# Patient Record
Sex: FEMALE | Race: WHITE | Employment: UNEMPLOYED | URBAN - METROPOLITAN AREA
[De-identification: names, ages, dates, MRNs, and addresses within clinical notes are randomized per-mention and may not be internally consistent; named-entity substitution may affect disease eponyms.]

---
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
## 2017-01-04 ENCOUNTER — TELEPHONE (OUTPATIENT)
Dept: NEUROLOGY | Age: 47
End: 2017-01-04

## 2017-01-04 NOTE — TELEPHONE ENCOUNTER
Spoke to patients mother and no seizures noted at higher phenytoin level   Will See Dr. Meagan Loya on 1/5/17

## 2017-01-05 ENCOUNTER — DOCUMENTATION ONLY (OUTPATIENT)
Dept: NEUROLOGY | Age: 47
End: 2017-01-05

## 2017-01-05 ENCOUNTER — OFFICE VISIT (OUTPATIENT)
Dept: NEUROLOGY | Age: 47
End: 2017-01-05

## 2017-01-05 VITALS
OXYGEN SATURATION: 98 % | SYSTOLIC BLOOD PRESSURE: 116 MMHG | HEART RATE: 70 BPM | BODY MASS INDEX: 39.49 KG/M2 | WEIGHT: 237 LBS | HEIGHT: 65 IN | DIASTOLIC BLOOD PRESSURE: 78 MMHG

## 2017-01-05 DIAGNOSIS — R56.9 SEIZURE (HCC): ICD-10-CM

## 2017-01-05 DIAGNOSIS — M54.31 SCIATICA OF RIGHT SIDE: ICD-10-CM

## 2017-01-05 DIAGNOSIS — Z79.899 HIGH RISK MEDICATION USE: ICD-10-CM

## 2017-01-05 DIAGNOSIS — G40.802 OTHER EPILEPSY WITHOUT STATUS EPILEPTICUS, NOT INTRACTABLE (HCC): Primary | ICD-10-CM

## 2017-01-05 DIAGNOSIS — R56.9 CONVULSIONS, UNSPECIFIED CONVULSION TYPE (HCC): ICD-10-CM

## 2017-01-05 DIAGNOSIS — M48.061 LUMBAR STENOSIS: ICD-10-CM

## 2017-01-05 DIAGNOSIS — F81.9 MENTAL DEVELOPMENTAL DELAY: ICD-10-CM

## 2017-01-05 RX ORDER — EXTENDED PHENYTOIN SODIUM 30 MG/1
30 CAPSULE ORAL DAILY
Qty: 90 CAP | Refills: 3 | Status: SHIPPED | OUTPATIENT
Start: 2017-01-05 | End: 2018-01-08 | Stop reason: SDUPTHER

## 2017-01-05 RX ORDER — PHENYTOIN SODIUM 100 MG/1
CAPSULE, EXTENDED RELEASE ORAL
Qty: 150 CAP | Refills: 3 | Status: SHIPPED | OUTPATIENT
Start: 2017-01-05 | End: 2017-04-13 | Stop reason: SDUPTHER

## 2017-01-05 NOTE — LETTER
Dear Elian Good MD, Thank you for allowing me to see your patient, Fior Hartley for a neurological consultation. Please see my impression and recommendations as outlined in my note. Sincerely, Aaron Correa MD 
[facility] (formerly [facility]3] Neurology Clinic at [facility] 
 
1/5/2017 Fior Hartley 
1970 
[id number] Seizures HISTORY OF PRESENT ILLNESS Fior Hartley  is a 51y.o. year old female who presents today, accompanied by mother, for follow up on seizure disorder. No seizures. She has a slightly high PHT level but is tolerating this completely fine. She is having no issues with the dose. Trying to decrease previously did cause seizures that were very intense. Dilantin 200/330 Trokendii 400mg daily She continues with pain on the right leg. She does have a disc at L3-4. She would like to restart PT. She does have a circumducted gait. Recap: 
She has had two seizures since Feb appt. One seizure was when she went for her first therapy session and she was stressed. She has another one while at [employer]. She is still having sleepiness with the Trokendii 400mg daily. She loves to sleep and doesn't get much exercise. She is going to [facility] for one day a week. She has a lipoma on her abdomen and she has to have surgery in September. She also may have a hernia. She continues on dilantin 200/300. We were going to wean this but she continued with seizures so remained on this dose. Patient has a new complaint today. She is having right leg and hip pain. She has pain on the lateral thigh. She is doing therapy for this. She has never had evaluation of her lower back. Patient Active Problem List  
 Diagnosis Date Noted  Chronic migraine 12/19/2014  Mental developmental delay 12/19/2014  Seizure ([address] 12/03/2013 Past Medical History Diagnosis Date  Bladder incontinence Past Surgical History Procedure Laterality Date  Hx appendectomy  Hx other surgical  2002  
  remove a lypoma Family History Problem Relation Age of Onset  Heart Disease Father Current Outpatient Prescriptions Medication Sig Dispense Refill  FLUoxetine (PROZAC) 20 mg capsule TAKE 1 CAPSULE BY MOUTH DAILY FOR MOOD 90 Cap 3  
 LORazepam (ATIVAN) 1 mg tablet Take 1 tab 1 hour prior to procedure Then take 1 tab at time of procedure 2 Tab 0  phenytoin ER (DILANTIN ER) 100 mg ER capsule Take 2 capsules by mouth in morning and take 2 capsules by mouth every day at bedtime 120 Cap 3  
 topiramate ER (TROKENDI XR) 200 mg capsule Take 2 Caps by mouth daily. 60 Cap 5  
 TROKENDI  mg capsule TAKE 2 CAPSULES BY MOUTH DAILY. 60 Cap 5  
 butalbital-acetaminophen-caffeine (FIORICET) -40 mg per tablet Take 1 Tab by mouth every four (4) hours as needed for Pain or Headache. Indications: MIGRAINE, TENSION-TYPE HEADACHE 50 Tab 1  
 DILANTIN 30 mg ER capsule Take 1 Cap by mouth daily. 90 Cap 3  
 mirabegron ER (MYRBETRIQ) 25 mg ER tablet Take 25 mg by mouth daily.  NITROFURANTOIN MONOHYD/M-CRYST (MACROBID PO) Take  by mouth.  OTHER Allergies Allergen Reactions  Adhesive Tape-Silicones Other (comments) Social History Social History  Marital status: SINGLE Spouse name: N/A  
 Number of children: N/A  
 Years of education: N/A Occupational History  Not on file. Social History Main Topics  Smoking status: Never Smoker  Smokeless tobacco: Not on file  Alcohol use No  
 Drug use: Not on file  Sexual activity: Not on file Other Topics Concern  Not on file Social History Narrative MRI L spine: L3-4 stenosis Review of Systems A comprehensive review of systems was negative except for that written in the HPI. Objective:  
 
Recent Vitals Visit Vitals  /78  Pulse 70  
 Ht 5' 5\" (1.651 m)  Wt 237 lb (107.5 kg)  SpO2 98%  BMI 39.44 kg/m2 Physical Exam: General appearance: alert, cooperative, no distress, slowed mentation Lungs: clear to auscultation bilaterally Heart: regular rate and rhythm, S1, S2 normal, no murmur, click, rub or gallop Extremities: extremities normal, atraumatic, no cyanosis or edema Skin: Skin color, texture, turgor normal. No rashes or lesions Neurologic: Alert and oriented X 3, normal strength and tone. Normal symmetric reflexes. Normal coordination and gait Assessment:  
 
Partial Complex seizures, doing well on current meds. PHT level slightly high but patient asymptomatic. She continues with right leg pain. Will restart therapy and consider a trial of gabapentin if this does not help. Plan: No labs needed MRI lumbar spine as above Dexa scan wnl Continue Dilantin 200/330. New prescription given Continue Trokendi 400 mg daily  
continue Fioricet for migraine headache Encourage daily mental and physical exercise Reorder physical therapy Consider gabapentin 300 mg nightly if pain persists Followup 6 months Signed: Corey Puri MD 
1/5/2017 
10:28 AM 
 
This note will not be viewable in [address]

## 2017-01-05 NOTE — MR AVS SNAPSHOT
Visit Information Date & Time Provider Department Dept. Phone Encounter #  
 1/5/2017 12:00 PM Jsoe Becerra MD Neurology Clinic at [facility] [phone] [id number] Your Appointments 3/3/2017 11:40 AM  
Follow Up with Jose Becerra MD  
Neurology Clinic at [facility] [address]) Appt Note: follow up seizures $ 0 CP jll 8/31/16  
 [facility], 
[address], [address] [facility] [address]  
[address], [address], [address] [facility] [address] Upcoming Health Maintenance Date Due DTaP/Tdap/Td series (1 - Tdap) 11/20/1990 PAP AKA CERVICAL CYTOLOGY 11/20/1990 INFLUENZA AGE 9 TO ADULT 8/1/2016 Allergies as of 1/5/2017  Review Complete On: 8/31/2016 By: Jose Becerra MD  
  
 Severity Noted Reaction Type Reactions Adhesive Tape-silicones  74/55/0747    Other (comments) Current Immunizations  Reviewed on 12/3/2013 No immunizations on file. Not reviewed this visit You Were Diagnosed With   
  
 Codes Comments Sciatica of right side    -  Primary ICD-10-CM: M54.31 
ICD-9-CM: 724.3 Convulsions, unspecified convulsion type [facility]     ICD-10-CM: R56.9 ICD-9-CM: 780.39 Lumbar stenosis     ICD-10-CM: M48.06 
ICD-9-CM: 724.02 Vitals BP Pulse Height(growth percentile) Weight(growth percentile) SpO2 BMI  
 116/78 70 5' 5\" (1.651 m) 237 lb (107.5 kg) 98% 39.44 kg/m2 OB Status Smoking Status Injection Never Smoker BMI and BSA Data Body Mass Index Body Surface Area  
 39.44 kg/m 2 2.22 m 2 Preferred Pharmacy Pharmacy Name Phone [facility] [address] [phone] Your Updated Medication List  
  
   
This list is accurate as of: 1/5/17 12:20 PM.  Always use your most recent med list.  
  
  
  
  
 butalbital-acetaminophen-caffeine -40 mg per tablet Commonly known as:  [employer] Take 1 Tab by mouth every four (4) hours as needed for Pain or Headache. Indications: MIGRAINE, TENSION-TYPE HEADACHE * DILANTIN 30 mg ER capsule Generic drug:  phenytoin ER Take 1 Cap by mouth daily. * phenytoin  mg ER capsule Commonly known as:  DILANTIN ER Take 2 capsules by mouth in morning and take 3 capsules by mouth every day at bedtime FLUoxetine 20 mg capsule Commonly known as:  PROzac TAKE 1 CAPSULE BY MOUTH DAILY FOR MOOD LORazepam 1 mg tablet Commonly known as:  ATIVAN Take 1 tab 1 hour prior to procedure  Then take 1 tab at time of procedure MACROBID PO Take  by mouth.  
  
 mirabegron ER 25 mg ER tablet Commonly known as:  MYRBETRIQ Take 25 mg by mouth daily. OTHER  
  
 * topiramate  mg capsule Commonly known as:  TROKENDI XR Take 2 Caps by mouth daily. * TROKENDI  mg capsule Generic drug:  topiramate ER  
TAKE 2 CAPSULES BY MOUTH DAILY. * Notice: This list has 4 medication(s) that are the same as other medications prescribed for you. Read the directions carefully, and ask your doctor or other care provider to review them with you. Prescriptions Sent to Pharmacy Refills DILANTIN 30 mg ER capsule 3 Sig: Take 1 Cap by mouth daily. Class: Normal  
 Pharmacy: [address] Ph #: [phone] Route: Oral  
 phenytoin ER (DILANTIN ER) 100 mg ER capsule 3 Sig: Take 2 capsules by mouth in morning and take 3 capsules by mouth every day at bedtime Class: Normal  
 Pharmacy: [address] Ph #: [phone] We Performed the Following REFERRAL TO PHYSICAL THERAPY [AYF28 Custom] Comments:  
 [place] [facility]. L3-4 stenosis and foraminal narrowing.  Please do guided PT exercises for back strengthening. Referral Information Referral ID Referred By Referred To  
  
 [id number] [facility] [place] Not Available Visits Status Start Date End Date 1 New Request 1/5/17 1/5/18 If your referral has a status of pending review or denied, additional information will be sent to support the outcome of this decision. Patient Instructions Patient Instructions/Plans: Will consider gabapentin for back pain Introducing [facility] SERVICES! Faith Moyer introduces [employer] patient portal. Now you can access parts of your medical record, email your doctor's office, and request medication refills online. 1. In your internet browser, go to [email or web address] 2. Click on the First Time User? Click Here link in the Sign In box. You will see the New Member Sign Up page. 3. Enter your [employer] Access Code exactly as it appears below. You will not need to use this code after youve completed the sign-up process. If you do not sign up before the expiration date, you must request a new code. · [employer] Access Code: [email or web address] Expires: 4/5/2017 11:41 AM 
 
4. Enter the last four digits of your Social Security Number (xxxx) and Date of Birth (mm/dd/yyyy) as indicated and click Submit. You will be taken to the next sign-up page. 5. Create a Signifyd ID. This will be your Sign[employer]d login ID and cannot be changed, so think of one that is secure and easy to remember. 6. Create a [employer] password. You can change your password at any time. 7. Enter your Password Reset Question and Answer. This can be used at a later time if you forget your password. 8. Enter your e-mail address. You will receive e-mail notification when new information is available in [address] 9. Click Sign Up. You can now view and download portions of your medical record. 10. Click the Download Summary menu link to download a portable copy of your medical information. If you have questions, please visit the Frequently Asked Questions section of the [employer] website. Remember, [employer] is NOT to be used for urgent needs. For medical emergencies, dial 911. Now available from your iPhone and Android! Please provide this summary of care documentation to your next provider. Your primary care clinician is listed as Nicole Yap. If you have any questions after today's visit, please call [phone].

## 2017-02-14 RX ORDER — TOPIRAMATE 200 MG/1
CAPSULE, EXTENDED RELEASE ORAL
Qty: 60 CAP | Refills: 4 | Status: SHIPPED | OUTPATIENT
Start: 2017-02-14 | End: 2017-07-17 | Stop reason: SDUPTHER

## 2017-03-31 ENCOUNTER — TELEPHONE (OUTPATIENT)
Dept: NEUROLOGY | Age: 47
End: 2017-03-31

## 2017-03-31 NOTE — TELEPHONE ENCOUNTER
Trokendi XR 24 hr cap approved 12/30/16 - 03/30/18  Member ID [id number] from [employer]. Faxed to [employer].

## 2017-04-13 RX ORDER — PHENYTOIN SODIUM 100 MG/1
CAPSULE, EXTENDED RELEASE ORAL
Qty: 150 CAP | Refills: 3 | Status: SHIPPED | OUTPATIENT
Start: 2017-04-13 | End: 2017-08-10 | Stop reason: SDUPTHER

## 2017-04-28 DIAGNOSIS — F81.9 MENTAL DEVELOPMENTAL DELAY: ICD-10-CM

## 2017-04-28 DIAGNOSIS — R56.9 CONVULSIONS, UNSPECIFIED CONVULSION TYPE (HCC): ICD-10-CM

## 2017-04-30 RX ORDER — BUTALBITAL, ACETAMINOPHEN AND CAFFEINE 50; 325; 40 MG/1; MG/1; MG/1
TABLET ORAL
Qty: 50 TAB | Refills: 1 | Status: ON HOLD | OUTPATIENT
Start: 2017-04-30 | End: 2019-04-08

## 2017-05-04 ENCOUNTER — TELEPHONE (OUTPATIENT)
Dept: NEUROLOGY | Age: 47
End: 2017-05-04

## 2017-07-17 RX ORDER — TOPIRAMATE 200 MG/1
CAPSULE, EXTENDED RELEASE ORAL
Qty: 60 CAP | Refills: 4 | Status: SHIPPED | OUTPATIENT
Start: 2017-07-17 | End: 2017-12-11 | Stop reason: SDUPTHER

## 2017-07-21 ENCOUNTER — OFFICE VISIT (OUTPATIENT)
Dept: NEUROLOGY | Age: 47
End: 2017-07-21

## 2017-07-21 VITALS
SYSTOLIC BLOOD PRESSURE: 128 MMHG | BODY MASS INDEX: 39.49 KG/M2 | WEIGHT: 237 LBS | DIASTOLIC BLOOD PRESSURE: 70 MMHG | HEART RATE: 74 BPM | HEIGHT: 65 IN | OXYGEN SATURATION: 98 %

## 2017-07-21 DIAGNOSIS — G93.49 CHRONIC STATIC ENCEPHALOPATHY: ICD-10-CM

## 2017-07-21 DIAGNOSIS — R26.9 GAIT DISORDER: ICD-10-CM

## 2017-07-21 DIAGNOSIS — G40.409 OTHER GENERALIZED EPILEPSY, NOT INTRACTABLE, WITHOUT STATUS EPILEPTICUS (HCC): ICD-10-CM

## 2017-07-21 DIAGNOSIS — G40.909 NONINTRACTABLE EPILEPSY WITHOUT STATUS EPILEPTICUS, UNSPECIFIED EPILEPSY TYPE (HCC): Primary | ICD-10-CM

## 2017-07-21 NOTE — PROGRESS NOTES
HISTORY OF PRESENT ILLNESS  Hayley Cuevas is a 51 y.o. female. 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 She has a seizure disorder and urinary of 2017. She is currently taking Dilantin 200 mg in the morning and 330 mg at night,  this is the extended release capsule. She is also taking Trokendi 400 mg daily. She denies hair loss. She continues to attend PT for problems with her right hip. She goes once a week. She has some headaches for which she takes generic Fioricet. She has a history of depression and currently takes Prozac 20 mg a day. MRI scan done prior to PT for her hip revealed L3-4 lumbar spine stenosis. She is overall doing well. Seizure    The history is provided by the patient (Mother). This is a chronic (Many years) problem. Review of Systems   Constitutional:        Review of systems is negative except for current complaints. Current Outpatient Prescriptions on File Prior to Visit   Medication Sig Dispense Refill    TROKENDI  mg capsule TAKE 2 CAPSULES BY MOUTH EVERY DAY 60 Cap 4    butalbital-acetaminophen-caffeine (FIORICET, ESGIC) -40 mg per tablet TAKE 1 TABLET BY MOUTH EVERY 4 HOURS AS NEEDED FOR HEADACHE OR PAIN 50 Tab 1    DILANTIN EXTENDED 100 mg ER capsule TAKE 2 CAPSULES BY MOUTH IN MORNING AND TAKE 3 CAPSULES BY MOUTH EVERY DAY AT BEDTIME 150 Cap 3    DILANTIN 30 mg ER capsule Take 1 Cap by mouth daily. 90 Cap 3    FLUoxetine (PROZAC) 20 mg capsule TAKE 1 CAPSULE BY MOUTH DAILY FOR MOOD 90 Cap 3    LORazepam (ATIVAN) 1 mg tablet Take 1 tab 1 hour prior to procedure   Then take 1 tab at time of procedure 2 Tab 0    topiramate ER (TROKENDI XR) 200 mg capsule Take 2 Caps by mouth daily. 60 Cap 5    mirabegron ER (MYRBETRIQ) 25 mg ER tablet Take 25 mg by mouth daily.  NITROFURANTOIN MONOHYD/M-CRYST (MACROBID PO) Take  by mouth.       OTHER        No current facility-administered medications on file prior to visit. Past Medical History:   Diagnosis Date    Bladder incontinence      Social History     Social History    Marital status: SINGLE     Spouse name: N/A    Number of children: N/A    Years of education: N/A     Occupational History    Not on file. Social History Main Topics    Smoking status: Never Smoker    Smokeless tobacco: Never Used    Alcohol use No    Drug use: Not on file    Sexual activity: Not on file     Other Topics Concern    Not on file     Social History Narrative     /70  Pulse 74  Ht 5' 5\" (1.651 m)  Wt 237 lb (107.5 kg)  SpO2 98%  BMI 39.44 kg/m2    Physical Exam   Constitutional: She is oriented to person, place, and time. She appears well-developed and well-nourished. No distress. HENT:   Head: Normocephalic and atraumatic. Nose: Nose normal.   Mouth/Throat: Oropharynx is clear and moist.   Eyes: Conjunctivae and EOM are normal. Pupils are equal, round, and reactive to light. No scleral icterus. Neck: Normal range of motion. Neck supple. No thyromegaly present. Cardiovascular: Normal rate and regular rhythm. Exam reveals no friction rub. No murmur heard. Pulmonary/Chest: Effort normal and breath sounds normal.   Musculoskeletal: Normal range of motion. She exhibits no edema, tenderness or deformity. Lymphadenopathy:     She has no cervical adenopathy. Neurological: She is alert and oriented to person, place, and time. She has normal reflexes. She displays tremor. She displays no atrophy. No cranial nerve deficit. She exhibits normal muscle tone. She displays a negative Romberg sign. Coordination and gait normal. She displays no Babinski's sign on the right side. She displays no Babinski's sign on the left side. Fundi normal  Carotids w/o bruit  Speech and language normal.  She has mild congenital static encephalopathy     Skin: Skin is warm and dry. No rash noted. She is not diaphoretic. No erythema.    Psychiatric: She has a normal mood and affect. Her behavior is normal. Judgment and thought content normal.   Vitals reviewed. ASSESSMENT and PLAN  EPILEPSY  Her epilepsy is under control on Dilantin 200/330 mg daily. See no reason to alter that at this time. I will check a a free and bound Dilantin level today. Her level last year was at the upper limits of normal however the free Dilantin was within normal limits. She shows no evidence of toxicity and I will go by the free Dilantin level  RIGHT HIP PAIN  Right hip pain is under control but not gone. I will renew her prescription for weekly physical therapy addressing that problem  CONGENITAL STATIC ENCEPHALOPATHY  Genital static encephalopathy, stable, she lives with her mother and is well cared for. Her mother is age and eventually family will have to assess Ms. Garcia long-term living plan.

## 2017-07-21 NOTE — PATIENT INSTRUCTIONS
[facility]   Statement to Patients  April 1, 2014      In an effort to ensure the large volume of patient prescription refills is processed in the most efficient and expeditious manner, we are asking our patients to assist us by calling your Pharmacy for all prescription refills, this will include also your  Mail Order Pharmacy. The pharmacy will contact our office electronically to continue the refill process. Please do not wait until the last minute to call your pharmacy. We need at least 48 hours (2days) to fill prescriptions. We also encourage you to call your pharmacy before going to  your prescription to make sure it is ready. With regard to controlled substance prescription refill requests (narcotic refills) that need to be picked up at our office, we ask your cooperation by providing us with at least 72 hours (3days) notice that you will need a refill. We will not refill narcotic prescription refill requests after 4:00pm on any weekday, Monday through Thursday, or after 2:00pm on Fridays, or on the weekends. We encourage everyone to explore another way of getting your prescription refill request processed using [employer], our patient web portal through our electronic medical record system. [employer] is an efficient and effective way to communicate your medication request directly to the office and  downloadable as an yakelin on your smart phone . [employer] also features a review functionality that allows you to view your medication list as well as leave messages for your physician. Are you ready to get connected? If so please review the attatched instructions or speak to any of our staff to get you set up right away! Thank you so much for your cooperation. Should you have any questions please contact our Practice Administrator.     The Physicians and Staff,  [facility]

## 2017-07-21 NOTE — MR AVS SNAPSHOT
Visit Information Date & Time Provider Department Dept. Phone Encounter #  
 7/21/2017 10:40 AM Porsche Moore MD Neurology Clinic at [facility] [phone] [id number] Follow-up Instructions Return in about 6 months (around 1/21/2018). Your Appointments 1/19/2018 10:40 AM  
Follow Up with Porsche Moore MD  
Neurology Clinic at [facility] Appt Note: follow up seizures $ 0 CP jll 7/201/7  
 [address] [place] [address], 
[address], [address] [phone]  
[address], [address], [address] [address] Upcoming Health Maintenance Date Due DTaP/Tdap/Td series (1 - Tdap) 11/20/1990 PAP AKA CERVICAL CYTOLOGY 11/20/1990 INFLUENZA AGE 9 TO ADULT 8/1/2017 Allergies as of 7/21/2017  Review Complete On: 7/21/2017 By: Dara Yeboah LPN Severity Noted Reaction Type Reactions Adhesive Tape-silicones  06/60/6865    Other (comments) Current Immunizations  Reviewed on 12/3/2013 No immunizations on file. Not reviewed this visit You Were Diagnosed With   
  
 Codes Comments Other generalized epilepsy, not intractable, without status epilepticus ([facility]    -  Primary ICD-10-CM: G40.409 ICD-9-CM: 345.90 Gait disorder     ICD-10-CM: R26.9 ICD-9-CM: 238. 2 Vitals BP Pulse Height(growth percentile) Weight(growth percentile) SpO2 BMI  
 128/70 74 5' 5\" (1.651 m) 237 lb (107.5 kg) 98% 39.44 kg/m2 OB Status Smoking Status Injection Never Smoker Vitals History BMI and BSA Data Body Mass Index Body Surface Area  
 39.44 kg/m 2 2.22 m 2 Preferred Pharmacy Pharmacy Name Phone [facility], [address] [phone] Your Updated Medication List  
  
   
This list is accurate as of: 7/21/17 11:43 AM.  Always use your most recent med list.  
  
  
  
  
 butalbital-acetaminophen-caffeine -40 mg per tablet Commonly known as:  FIORICET, ESGIC  
TAKE 1 TABLET BY MOUTH EVERY 4 HOURS AS NEEDED FOR HEADACHE OR PAIN  
  
 * DILANTIN 30 mg ER capsule Generic drug:  phenytoin ER Take 1 Cap by mouth daily. * DILANTIN EXTENDED 100 mg ER capsule Generic drug:  phenytoin ER  
TAKE 2 CAPSULES BY MOUTH IN MORNING AND TAKE 3 CAPSULES BY MOUTH EVERY DAY AT BEDTIME FLUoxetine 20 mg capsule Commonly known as:  PROzac TAKE 1 CAPSULE BY MOUTH DAILY FOR MOOD LORazepam 1 mg tablet Commonly known as:  ATIVAN Take 1 tab 1 hour prior to procedure  Then take 1 tab at time of procedure MACROBID PO Take  by mouth.  
  
 mirabegron ER 25 mg ER tablet Commonly known as:  MYRBETRIQ Take 25 mg by mouth daily. OTHER  
  
 * topiramate  mg capsule Commonly known as:  TROKENDI XR Take 2 Caps by mouth daily. * TROKENDI  mg capsule Generic drug:  topiramate ER  
TAKE 2 CAPSULES BY MOUTH EVERY DAY  
  
 * Notice: This list has 4 medication(s) that are the same as other medications prescribed for you. Read the directions carefully, and ask your doctor or other care provider to review them with you. We Performed the Following PHENYTOIN, TOTAL & FREE R3121856 CPT(R)] REFERRAL TO PHYSICAL THERAPY [ORC12 Custom] Comments:  
 Evaluate and treat for assymetric gait disorder Follow-up Instructions Return in about 6 months (around 1/21/2018). Referral Information Referral ID Referred By Referred To  
  
 [id number] Mya England Not Available Visits Status Start Date End Date 1 New Request 7/21/17 7/21/18 If your referral has a status of pending review or denied, additional information will be sent to support the outcome of this decision. Patient Instructions PRESCRIPTION REFILL POLICY [facility] Statement to Patients April 1, 2014 In an effort to ensure the large volume of patient prescription refills is processed in the most efficient and expeditious manner, we are asking our patients to assist us by calling your Pharmacy for all prescription refills, this will include also your  Mail Order Pharmacy. The pharmacy will contact our office electronically to continue the refill process. Please do not wait until the last minute to call your pharmacy. We need at least 48 hours (2days) to fill prescriptions. We also encourage you to call your pharmacy before going to  your prescription to make sure it is ready. With regard to controlled substance prescription refill requests (narcotic refills) that need to be picked up at our office, we ask your cooperation by providing us with at least 72 hours (3days) notice that you will need a refill. We will not refill narcotic prescription refill requests after 4:00pm on any weekday, Monday through Thursday, or after 2:00pm on Fridays, or on the weekends. We encourage everyone to explore another way of getting your prescription refill request processed using [employer], our patient web portal through our electronic medical record system. [employer] is an efficient and effective way to communicate your medication request directly to the office and  downloadable as an yakelin on your smart phone . [employer] also features a review functionality that allows you to view your medication list as well as leave messages for your physician. Are you ready to get connected? If so please review the attatched instructions or speak to any of our staff to get you set up right away! Thank you so much for your cooperation. Should you have any questions please contact our Practice Administrator. The Physicians and Staff,  [facility] Introducing [facility]!    
 [facility] introduces [employer] patient portal. Now you can access parts of your medical record, email your doctor's office, and request medication refills online. 1. In your internet browser, go to [email or web address] 2. Click on the First Time User? Click Here link in the Sign In box. You will see the New Member Sign Up page. 3. Enter your [employer] Access Code exactly as it appears below. You will not need to use this code after youve completed the sign-up process. If you do not sign up before the expiration date, you must request a new code. · [employer] Access Code: [email or web address] Expires: 10/19/2017 11:38 AM 
 
4. Enter the last four digits of your Social Security Number (xxxx) and Date of Birth (mm/dd/yyyy) as indicated and click Submit. You will be taken to the next sign-up page. 5. Create a [employer] ID. This will be your [employer] login ID and cannot be changed, so think of one that is secure and easy to remember. 6. Create a [employer] password. You can change your password at any time. 7. Enter your Password Reset Question and Answer. This can be used at a later time if you forget your password. 8. Enter your e-mail address. You will receive e-mail notification when new information is available in [address] 9. Click Sign Up. You can now view and download portions of your medical record. 10. Click the Download Summary menu link to download a portable copy of your medical information. If you have questions, please visit the Frequently Asked Questions section of the [employer] website. Remember, [employer] is NOT to be used for urgent needs. For medical emergencies, dial 911. Now available from your iPhone and Android! Please provide this summary of care documentation to your next provider. Your primary care clinician is listed as Malinda Shabazz. If you have any questions after today's visit, please call [phone].

## 2017-07-24 ENCOUNTER — DOCUMENTATION ONLY (OUTPATIENT)
Dept: NEUROLOGY | Age: 47
End: 2017-07-24

## 2017-07-25 LAB
PHENYTOIN FREE SERPL-MCNC: 1.9 UG/ML (ref 1–2)
PHENYTOIN SERPL-MCNC: 27.2 UG/ML (ref 10–20)

## 2017-07-25 NOTE — PROGRESS NOTES
Dilantin level is high but free Dilantin is still within normal limits and the patient is exhibiting no symptoms of toxicity. Historically she had seizure problems when the dose was decreased in the past so I will maintain it at current levels.

## 2017-08-10 RX ORDER — PHENYTOIN SODIUM 100 MG/1
CAPSULE, EXTENDED RELEASE ORAL
Qty: 150 CAP | Refills: 3 | Status: SHIPPED | OUTPATIENT
Start: 2017-08-10 | End: 2017-10-24 | Stop reason: SDUPTHER

## 2017-09-01 ENCOUNTER — HOSPITAL ENCOUNTER (OUTPATIENT)
Dept: MRI IMAGING | Age: 47
Discharge: HOME | End: 2017-09-01
Attending: OPHTHALMOLOGY
Payer: MEDICARE

## 2017-09-01 VITALS — WEIGHT: 235 LBS | BODY MASS INDEX: 39.11 KG/M2

## 2017-09-01 DIAGNOSIS — H53.453 VISUAL FIELD DEFECT, NASAL STEP, BILATERAL: ICD-10-CM

## 2017-09-01 PROCEDURE — A9576 INJ PROHANCE MULTIPACK: HCPCS

## 2017-09-01 PROCEDURE — 70553 MRI BRAIN STEM W/O & W/DYE: CPT

## 2017-09-01 PROCEDURE — 74011250636 HC RX REV CODE- 250/636

## 2017-09-01 RX ADMIN — GADOTERIDOL 20 ML: 279.3 INJECTION, SOLUTION INTRAVENOUS at 20:20

## 2017-10-13 DIAGNOSIS — F41.9 ANXIETY: ICD-10-CM

## 2017-10-13 DIAGNOSIS — R46.89 OUTBURSTS OF EXPLOSIVE BEHAVIOR: ICD-10-CM

## 2017-10-13 RX ORDER — FLUOXETINE HYDROCHLORIDE 20 MG/1
CAPSULE ORAL
Qty: 90 CAP | Refills: 1 | Status: SHIPPED | OUTPATIENT
Start: 2017-10-13 | End: 2018-06-25 | Stop reason: SDUPTHER

## 2017-10-13 NOTE — TELEPHONE ENCOUNTER
Future Appointments  Date Time Provider Aguila Mcclure   1/19/2018 10:40 AM Melania Fleming MD [address]                         Last Appointment My Department:  7/21/2017    Please advise of refill below.    Requested Prescriptions     Pending Prescriptions Disp Refills    FLUoxetine (PROZAC) 20 mg capsule [Pharmacy Med Name: FLUOXETINE   20MG] 90 Cap 1     Sig: TAKE 1 CAPSULE BY MOUTH DAILY FOR MOOD

## 2018-01-08 DIAGNOSIS — R56.9 CONVULSIONS, UNSPECIFIED CONVULSION TYPE (HCC): ICD-10-CM

## 2018-01-08 RX ORDER — EXTENDED PHENYTOIN SODIUM 30 MG/1
CAPSULE ORAL
Qty: 90 CAP | Refills: 0 | Status: SHIPPED | OUTPATIENT
Start: 2018-01-08 | End: 2018-01-19 | Stop reason: ALTCHOICE

## 2018-01-19 ENCOUNTER — OFFICE VISIT (OUTPATIENT)
Dept: NEUROLOGY | Age: 48
End: 2018-01-19

## 2018-01-19 VITALS
OXYGEN SATURATION: 97 % | WEIGHT: 227 LBS | HEART RATE: 70 BPM | DIASTOLIC BLOOD PRESSURE: 78 MMHG | BODY MASS INDEX: 37.82 KG/M2 | HEIGHT: 65 IN | SYSTOLIC BLOOD PRESSURE: 122 MMHG

## 2018-01-19 DIAGNOSIS — G40.909 NONINTRACTABLE EPILEPSY WITHOUT STATUS EPILEPTICUS, UNSPECIFIED EPILEPSY TYPE (HCC): Primary | ICD-10-CM

## 2018-01-19 DIAGNOSIS — Q07.9 CONGENITAL ENCEPHALOPATHY (HCC): ICD-10-CM

## 2018-01-19 RX ORDER — PHENYTOIN SODIUM 100 MG/1
CAPSULE, EXTENDED RELEASE ORAL
Qty: 450 CAP | Refills: 3 | Status: SHIPPED | OUTPATIENT
Start: 2018-01-19 | End: 2018-01-19 | Stop reason: SDUPTHER

## 2018-01-19 RX ORDER — TOPIRAMATE 200 MG/1
CAPSULE, EXTENDED RELEASE ORAL
Qty: 180 CAP | Refills: 3 | Status: SHIPPED | OUTPATIENT
Start: 2018-01-19 | End: 2018-05-10 | Stop reason: SDUPTHER

## 2018-01-19 RX ORDER — PHENYTOIN SODIUM 100 MG/1
CAPSULE, EXTENDED RELEASE ORAL
Qty: 450 CAP | Refills: 3 | Status: SHIPPED | OUTPATIENT
Start: 2018-01-19 | End: 2018-07-26 | Stop reason: SDUPTHER

## 2018-01-19 NOTE — PROGRESS NOTES
HISTORY OF PRESENT ILLNESS  Sharla Murrell is a 49 y.o. female. 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 She has a seizure disorder and urinary of 2017. She is currently taking Dilantin 200 mg in the morning and 330 mg at night,  this is the extended release capsule. She is also taking Trokendi 400 mg daily. She denies hair loss. Her seizure sound partial in nature, she does not lose consciousness with them she will often have some abnormal movements of the right side and the a relatively brief 10-15 seconds. There does not appear to be a postictal.  The mother is questioning whether the vagus nerve stimulator would be a consideration in this patient's case. Review of Systems   Constitutional:        Review of systems is negative except for current complaints. Current Outpatient Prescriptions on File Prior to Visit   Medication Sig Dispense Refill    FLUoxetine (PROZAC) 20 mg capsule TAKE 1 CAPSULE BY MOUTH DAILY FOR MOOD 90 Cap 1    butalbital-acetaminophen-caffeine (FIORICET, ESGIC) -40 mg per tablet TAKE 1 TABLET BY MOUTH EVERY 4 HOURS AS NEEDED FOR HEADACHE OR PAIN 50 Tab 1    LORazepam (ATIVAN) 1 mg tablet Take 1 tab 1 hour prior to procedure   Then take 1 tab at time of procedure 2 Tab 0    mirabegron ER (MYRBETRIQ) 25 mg ER tablet Take 25 mg by mouth daily.  NITROFURANTOIN MONOHYD/M-CRYST (MACROBID PO) Take  by mouth.  OTHER        No current facility-administered medications on file prior to visit. Past Medical History:   Diagnosis Date    Bladder incontinence      Social History     Social History    Marital status: SINGLE     Spouse name: N/A    Number of children: N/A    Years of education: N/A     Occupational History    Not on file.      Social History Main Topics    Smoking status: Never Smoker    Smokeless tobacco: Never Used    Alcohol use No    Drug use: Not on file    Sexual activity: Not on file     Other Topics Concern    Not on file     Social History Narrative     /[birth year]  Pulse 70  Ht 5' 5\" (1.651 m)  Wt 227 lb (103 kg)  SpO2 97%  BMI 37.77 kg/m2    Physical Exam   Constitutional: She is oriented to person, place, and time. She appears well-developed and well-nourished. No distress. HENT:   Head: Normocephalic and atraumatic. Nose: Nose normal.   Mouth/Throat: Oropharynx is clear and moist.   Eyes: Conjunctivae and EOM are normal. Pupils are equal, round, and reactive to light. No scleral icterus. Neurological: She is alert and oriented to person, place, and time. She has normal reflexes. She displays tremor. She displays no atrophy. No cranial nerve deficit. She exhibits normal muscle tone. She displays a negative Romberg sign. Coordination and gait normal.   Speech and language normal.  She has mild congenital static encephalopathy     Skin: Skin is warm and dry. No rash noted. She is not diaphoretic. No erythema. Psychiatric: She has a normal mood and affect. Her behavior is normal. Judgment and thought content normal.   Vitals reviewed. ASSESSMENT and PLAN  EPILEPSY  Her epilepsy is under control on Dilantin 200/330 mg daily. See no reason to alter that at this time. I will check a topiramate level and a free and bound Dilantin. In the past her Dilantin level was high however the free Dilantin was within normal limits, I will go by the free Dilantin. We discussed the vagus nerve stimulator and once I made it clear to them that the stimulator had to be triggered and was really reserved for those with medication refractory epilepsy, the mother agreed that this was not the device for [facility]. Her last Dilantin levels were 27.2 bound and 1.9 free, last topiramate was over a year ago and was 5.3. Those were both acceptable. I will repeat those labs today. Her last DEXA scan was normal.  We will repeated in 2 years.     CONGENITAL STATIC ENCEPHALOPATHY  Genital static encephalopathy, stable, she lives with her mother and is well cared for. Her mother is age and eventually family will have to assess Ms. Garcia long-term living plan. This note will not be viewable in [address]

## 2018-01-19 NOTE — MR AVS SNAPSHOT
[address] [place] [address], 
[facility], [address] [facility] 
[phone] Patient: Isidro Braun 
MRN: [id number] :1970 Visit Information Date & Time Provider Department Dept. Phone Encounter #  
 2018 10:40 AM Marion Guadalupe MD Neurology Clinic at [facility] [phone] [id number] Upcoming Health Maintenance Date Due DTaP/Tdap/Td series (1 - Tdap) 1990 PAP AKA CERVICAL CYTOLOGY 1990 Influenza Age 5 to Adult 2017 Allergies as of 2018  Review Complete On: 2018 By: Sloan Santiago Severity Noted Reaction Type Reactions Adhesive Tape-silicones      Other (comments) Current Immunizations  Reviewed on 12/3/2013 No immunizations on file. Not reviewed this visit You Were Diagnosed With   
  
 Codes Comments Nonintractable epilepsy without status epilepticus, unspecified epilepsy type ([facility]    -  Primary ICD-10-CM: Z09.388 ICD-9-CM: 345.90 Congenital encephalopathy ([facility].)     ICD-10-CM: Q07.9 ICD-9-CM: 348. 9 Vitals BP Pulse Height(growth percentile) Weight(growth percentile) SpO2 BMI  
 122/78 70 5' 5\" (1.651 m) 227 lb (103 kg) 97% 37.77 kg/m2 OB Status Smoking Status Injection Never Smoker BMI and BSA Data Body Mass Index Body Surface Area  
 37.77 kg/m 2 2.17 m 2 Preferred Pharmacy Pharmacy Name Phone [facility] [phone] - [address], [address] AT [place] [address] [phone] Your Updated Medication List  
  
   
This list is accurate as of: 18 11:24 AM.  Always use your most recent med list.  
  
  
  
  
 butalbital-acetaminophen-caffeine -40 mg per tablet Commonly known as:  FIORICET, ESGIC  
TAKE 1 TABLET BY MOUTH EVERY 4 HOURS AS NEEDED FOR HEADACHE OR PAIN  
  
 FLUoxetine 20 mg capsule Commonly known as:  PROzac TAKE 1 CAPSULE BY MOUTH DAILY FOR MOOD LORazepam 1 mg tablet Commonly known as:  ATIVAN Take 1 tab 1 hour prior to procedure  Then take 1 tab at time of procedure MACROBID PO Take  by mouth.  
  
 mirabegron ER 25 mg ER tablet Commonly known as:  MYRBETRIQ Take 25 mg by mouth daily. OTHER * phenytoin ER 30 mg ER capsule Commonly known as:  DILANTIN ER Take 1 Cap by mouth daily. BRAND MEDICALLY NECESSARY  Indications: Epilepsy * phenytoin  mg ER capsule Commonly known as:  DILANTIN ER  
2 every morning and 3 nightly BRAND MEDICALLY NECESSARY  Indications: Epilepsy TROKENDI  mg capsule Generic drug:  topiramate ER Take 2 a day BRAND MEDICALLY NECESSARY  Indications: TONIC-CLONIC EPILEPSY * Notice: This list has 2 medication(s) that are the same as other medications prescribed for you. Read the directions carefully, and ask your doctor or other care provider to review them with you. Prescriptions Printed Refills TROKENDI  mg capsule 3 Sig: Take 2 a day BRAND MEDICALLY NECESSARY  Indications: TONIC-CLONIC EPILEPSY Class: Print  
 phenytoin ER (DILANTIN ER) 30 mg ER capsule 3 Sig: Take 1 Cap by mouth daily. BRAND MEDICALLY NECESSARY  Indications: Epilepsy Class: Print Route: Oral  
 phenytoin ER (DILANTIN ER) 100 mg ER capsule 3 Si every morning and 3 nightly BRAND MEDICALLY NECESSARY  Indications: Epilepsy Class: Print We Performed the Following PHENYTOIN [id number] CPT(R)] TOPIRAMATE [89017 CPT(R)] Patient Instructions Please be advised there is a $25 fee for all paperwork to be completed from our  providers. This is to be paid by the patient prior to picking up the completed forms. A Healthy Lifestyle: Care Instructions Your Care Instructions A healthy lifestyle can help you feel good, stay at a healthy weight, and have plenty of energy for both work and play. A healthy lifestyle is something you can share with your whole family. A healthy lifestyle also can lower your risk for serious health problems, such as high blood pressure, heart disease, and diabetes. You can follow a few steps listed below to improve your health and the health of your family. Follow-up care is a key part of your treatment and safety. Be sure to make and go to all appointments, and call your doctor if you are having problems. It's also a good idea to know your test results and keep a list of the medicines you take. How can you care for yourself at home? · Do not eat too much sugar, fat, or fast foods. You can still have dessert and treats now and then. The goal is moderation. · Start small to improve your eating habits. Pay attention to portion sizes, drink less juice and soda pop, and eat more fruits and vegetables. ¨ Eat a healthy amount of food. A 3-ounce serving of meat, for example, is about the size of a deck of cards. Fill the rest of your plate with vegetables and whole grains. ¨ Limit the amount of soda and sports drinks you have every day. Drink more water when you are thirsty. ¨ Eat at least 5 servings of fruits and vegetables every day. It may seem like a lot, but it is not hard to reach this goal. A serving or helping is 1 piece of fruit, 1 cup of vegetables, or 2 cups of leafy, raw vegetables. Have an apple or some carrot sticks as an afternoon snack instead of a candy bar. Try to have fruits and/or vegetables at every meal. 
· Make exercise part of your daily routine. You may want to start with simple activities, such as walking, bicycling, or slow swimming. Try to be active 30 to 60 minutes every day. You do not need to do all 30 to 60 minutes all at once. For example, you can exercise 3 times a day for 10 or 20 minutes.  Moderate exercise is safe for most people, but it is always a good idea to talk to your doctor before starting an exercise program. 
· Keep moving. Yamil Guo the lawn, work in the garden, or [facility]. Take the stairs instead of the elevator at work. · If you smoke, quit. People who smoke have an increased risk for heart attack, stroke, cancer, and other lung illnesses. Quitting is hard, but there are ways to boost your chance of quitting tobacco for good. ¨ Use nicotine gum, patches, or lozenges. ¨ Ask your doctor about stop-smoking programs and medicines. ¨ Keep trying. In addition to reducing your risk of diseases in the future, you will notice some benefits soon after you stop using tobacco. If you have shortness of breath or asthma symptoms, they will likely get better within a few weeks after you quit. · Limit how much alcohol you drink. Moderate amounts of alcohol (up to 2 drinks a day for men, 1 drink a day for women) are okay. But drinking too much can lead to liver problems, high blood pressure, and other health problems. Family health If you have a family, there are many things you can do together to improve your health. · Eat meals together as a family as often as possible. · Eat healthy foods. This includes fruits, vegetables, lean meats and dairy, and whole grains. · Include your family in your fitness plan. Most people think of activities such as jogging or tennis as the way to fitness, but there are many ways you and your family can be more active. Anything that makes you breathe hard and gets your heart pumping is exercise. Here are some tips: 
¨ Walk to do errands or to take your child to school or the bus. ¨ Go for a family bike ride after dinner instead of watching TV. Where can you learn more? Go to http://manju-haritha.info/. Enter R515 in the search box to learn more about \"A Healthy Lifestyle: Care Instructions. \" Current as of: May 12, 2017 Content Version: 11.4 © 6083-9502 Healthwise, Incorporated. Care instructions adapted under license by [employer] (which disclaims liability or warranty for this information). If you have questions about a medical condition or this instruction, always ask your healthcare professional. [address] any warranty or liability for your use of this information. Introducing [facility]! Neelamstephania Howell introduces [employer] patient portal. Now you can access parts of your medical record, email your doctor's office, and request medication refills online. 1. In your internet browser, go to [email or web address] 2. Click on the First Time User? Click Here link in the Sign In box. You will see the New Member Sign Up page. 3. Enter your [employer] Access Code exactly as it appears below. You will not need to use this code after youve completed the sign-up process. If you do not sign up before the expiration date, you must request a new code. · [employer] Access Code: [email or web address] Expires: 4/19/2018 11:04 AM 
 
4. Enter the last four digits of your Social Security Number (xxxx) and Date of Birth (mm/dd/yyyy) as indicated and click Submit. You will be taken to the next sign-up page. 5. Create a [employer] ID. This will be your [employer] login ID and cannot be changed, so think of one that is secure and easy to remember. 6. Create a [employer] password. You can change your password at any time. 7. Enter your Password Reset Question and Answer. This can be used at a later time if you forget your password. 8. Enter your e-mail address. You will receive e-mail notification when new information is available in [address] 9. Click Sign Up. You can now view and download portions of your medical record. 10. Click the Download Summary menu link to download a portable copy of your medical information.  
 
If you have questions, please visit the Frequently Asked Questions section of the [employer]. Remember, [employer] is NOT to be used for urgent needs. For medical emergencies, dial 911. Now available from your iPhone and Android! Please provide this summary of care documentation to your next provider. Your primary care clinician is listed as Maeola Gitelman. If you have any questions after today's visit, please call [phone].

## 2018-01-19 NOTE — PATIENT INSTRUCTIONS
Please be advised there is a $25 fee for all paperwork to be completed from our  providers. This is to be paid by the patient prior to picking up the completed forms. A Healthy Lifestyle: Care Instructions  Your Care Instructions    A healthy lifestyle can help you feel good, stay at a healthy weight, and have plenty of energy for both work and play. A healthy lifestyle is something you can share with your whole family. A healthy lifestyle also can lower your risk for serious health problems, such as high blood pressure, heart disease, and diabetes. You can follow a few steps listed below to improve your health and the health of your family. Follow-up care is a key part of your treatment and safety. Be sure to make and go to all appointments, and call your doctor if you are having problems. It's also a good idea to know your test results and keep a list of the medicines you take. How can you care for yourself at home? · Do not eat too much sugar, fat, or fast foods. You can still have dessert and treats now and then. The goal is moderation. · Start small to improve your eating habits. Pay attention to portion sizes, drink less juice and soda pop, and eat more fruits and vegetables. ¨ Eat a healthy amount of food. A 3-ounce serving of meat, for example, is about the size of a deck of cards. Fill the rest of your plate with vegetables and whole grains. ¨ Limit the amount of soda and sports drinks you have every day. Drink more water when you are thirsty. ¨ Eat at least 5 servings of fruits and vegetables every day. It may seem like a lot, but it is not hard to reach this goal. A serving or helping is 1 piece of fruit, 1 cup of vegetables, or 2 cups of leafy, raw vegetables. Have an apple or some carrot sticks as an afternoon snack instead of a candy bar. Try to have fruits and/or vegetables at every meal.  · Make exercise part of your daily routine.  You may want to start with simple activities, such as walking, bicycling, or slow swimming. Try to be active 30 to 60 minutes every day. You do not need to do all 30 to 60 minutes all at once. For example, you can exercise 3 times a day for 10 or 20 minutes. Moderate exercise is safe for most people, but it is always a good idea to talk to your doctor before starting an exercise program.  · Keep moving. [place] the lawn, work in the garden, or [employer]. Take the stairs instead of the elevator at work. · If you smoke, quit. People who smoke have an increased risk for heart attack, stroke, cancer, and other lung illnesses. Quitting is hard, but there are ways to boost your chance of quitting tobacco for good. ¨ Use nicotine gum, patches, or lozenges. ¨ Ask your doctor about stop-smoking programs and medicines. ¨ Keep trying. In addition to reducing your risk of diseases in the future, you will notice some benefits soon after you stop using tobacco. If you have shortness of breath or asthma symptoms, they will likely get better within a few weeks after you quit. · Limit how much alcohol you drink. Moderate amounts of alcohol (up to 2 drinks a day for men, 1 drink a day for women) are okay. But drinking too much can lead to liver problems, high blood pressure, and other health problems. Family health  If you have a family, there are many things you can do together to improve your health. · Eat meals together as a family as often as possible. · Eat healthy foods. This includes fruits, vegetables, lean meats and dairy, and whole grains. · Include your family in your fitness plan. Most people think of activities such as jogging or tennis as the way to fitness, but there are many ways you and your family can be more active. Anything that makes you breathe hard and gets your heart pumping is exercise. Here are some tips:  ¨ Walk to do errands or to take your child to school or the bus. ¨ Go for a family bike ride after dinner instead of watching TV.   Where can you learn more? Go to http://manju-haritha.info/. Enter N892 in the search box to learn more about \"A Healthy Lifestyle: Care Instructions. \"  Current as of: May 12, 2017  Content Version: 11.4  © 8908-0823 Healthwise, [employer]. Care instructions adapted under license by [employer] (which disclaims liability or warranty for this information). If you have questions about a medical condition or this instruction, always ask your healthcare professional. [address] any warranty or liability for your use of this information.

## 2018-01-19 NOTE — LETTER
1/19/2018 11:47 AM 
 
Patient:  Deb Lombardo  
YOB: 1970 Date of Visit: 1/19/2018 Dear Edinson Delgado MD 
[address] [place] [id number] VIA Facsimile: [phone] 
 : Thank you for referring Ms. Malave Carrie to me for evaluation/treatment. Below are the relevant portions of my assessment and plan of care. .pnotes If you have questions, please do not hesitate to call me. I look forward to following Ms. Espitia along with you. Sincerely, Vikram Garrido MD

## 2018-01-19 NOTE — LETTER
1/19/2018 11:51 AM 
 
Patient:  Kaela Martínez  
YOB: 1970 Date of Visit: 1/19/2018 Dear Misha Huitron MD 
[address] [address] VIA Facsimile: [phone] 
 : Thank you for referring Ms. Monroe Tena to me for evaluation/treatment. Below are the relevant portions of my assessment and plan of care. HISTORY OF PRESENT ILLNESS Kaela Martínez is a 49 y.o. female. 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 She has a seizure disorder and urinary of 2017. She is currently taking Dilantin 200 mg in the morning and 330 mg at night,  this is the extended release capsule. She is also taking Trokendi 400 mg daily. She denies hair loss. Her seizure sound partial in nature, she does not lose consciousness with them she will often have some abnormal movements of the right side and the a relatively brief 10-15 seconds. There does not appear to be a postictal. 
The mother is questioning whether the vagus nerve stimulator would be a consideration in this patient's case. Review of Systems Constitutional:  
     Review of systems is negative except for current complaints. Current Outpatient Prescriptions on File Prior to Visit Medication Sig Dispense Refill  FLUoxetine (PROZAC) 20 mg capsule TAKE 1 CAPSULE BY MOUTH DAILY FOR MOOD 90 Cap 1  
 butalbital-acetaminophen-caffeine (FIORICET, ESGIC) -40 mg per tablet TAKE 1 TABLET BY MOUTH EVERY 4 HOURS AS NEEDED FOR HEADACHE OR PAIN 50 Tab 1  
 LORazepam (ATIVAN) 1 mg tablet Take 1 tab 1 hour prior to procedure Then take 1 tab at time of procedure 2 Tab 0  
 mirabegron ER (MYRBETRIQ) 25 mg ER tablet Take 25 mg by mouth daily.  NITROFURANTOIN MONOHYD/M-CRYST (MACROBID PO) Take  by mouth.  OTHER No current facility-administered medications on file prior to visit. Past Medical History:  
Diagnosis Date  Bladder incontinence Social History Social History  Marital status: SINGLE Spouse name: N/A  
 Number of children: N/A  
 Years of education: N/A Occupational History  Not on file. Social History Main Topics  Smoking status: Never Smoker  Smokeless tobacco: Never Used  Alcohol use No  
 Drug use: Not on file  Sexual activity: Not on file Other Topics Concern  Not on file Social History Narrative /78  Pulse 70  Ht 5' 5\" (1.651 m)  Wt 227 lb (103 kg)  SpO2 97%  BMI 37.77 kg/m2 Physical Exam  
Constitutional: She is oriented to person, place, and time. She appears well-developed and well-nourished. No distress. HENT:  
Head: Normocephalic and atraumatic. Nose: Nose normal.  
Mouth/Throat: Oropharynx is clear and moist.  
Eyes: Conjunctivae and EOM are normal. Pupils are equal, round, and reactive to light. No scleral icterus. Neurological: She is alert and oriented to person, place, and time. She has normal reflexes. She displays tremor. She displays no atrophy. No cranial nerve deficit. She exhibits normal muscle tone. She displays a negative Romberg sign. Coordination and gait normal.  
Speech and language normal. 
She has mild congenital static encephalopathy Skin: Skin is warm and dry. No rash noted. She is not diaphoretic. No erythema. Psychiatric: She has a normal mood and affect. Her behavior is normal. Judgment and thought content normal.  
Vitals reviewed. ASSESSMENT and PLAN 
EPILEPSY Her epilepsy is under control on Dilantin 200/330 mg daily. See no reason to alter that at this time. I will check a topiramate level and a free and bound Dilantin. In the past her Dilantin level was high however the free Dilantin was within normal limits, I will go by the free Dilantin.   We discussed the vagus nerve stimulator and once I made it clear to them that the stimulator had to be triggered and was really reserved for those with medication refractory epilepsy, the mother agreed that this was not the device for [facility]. Her last Dilantin levels were 27.2 bound and 1.9 free, last topiramate was over a year ago and was 5.3. Those were both acceptable. I will repeat those labs today. Her last DEXA scan was normal.  We will repeated in 2 years. CONGENITAL STATIC ENCEPHALOPATHY Genital static encephalopathy, stable, she lives with her mother and is well cared for. Her mother is age and eventually family will have to assess Ms. Garcia long-term living plan. This note will not be viewable in [address] If you have questions, please do not hesitate to call me. I look forward to following Ms. Espitia along with you. Sincerely, Leora Chase MD

## 2018-01-22 ENCOUNTER — TELEPHONE (OUTPATIENT)
Dept: NEUROLOGY | Age: 48
End: 2018-01-22

## 2018-01-22 DIAGNOSIS — G40.309 NONINTRACTABLE GENERALIZED IDIOPATHIC EPILEPSY WITHOUT STATUS EPILEPTICUS (HCC): Primary | ICD-10-CM

## 2018-01-22 LAB
PHENYTOIN SERPL-MCNC: 30.8 UG/ML (ref 10–20)
TOPIRAMATE SERPL-MCNC: 3.6 UG/ML (ref 2–25)

## 2018-01-22 NOTE — TELEPHONE ENCOUNTER
Spoke to mother and relayed message from Dr. Milagros Jones patient needs to have new phenytoin total and free level drawn   They will  lab slip at  in AM.

## 2018-01-26 ENCOUNTER — TELEPHONE (OUTPATIENT)
Dept: NEUROLOGY | Age: 48
End: 2018-01-26

## 2018-01-26 NOTE — TELEPHONE ENCOUNTER
Dr. Sunday Cuello, patient had repeat phenytoin free and total , serum  Level drawn on 1/23/18 per your request   Level was 31.0   Dr. Sunday Cuello, do you want to change anything

## 2018-01-31 LAB
PHENYTOIN FREE SERPL-MCNC: 2.1 UG/ML (ref 1–2)
PHENYTOIN SERPL-MCNC: 31 UG/ML (ref 10–20)

## 2018-02-01 ENCOUNTER — TELEPHONE (OUTPATIENT)
Dept: NEUROLOGY | Age: 48
End: 2018-02-01

## 2018-02-01 NOTE — TELEPHONE ENCOUNTER
Spoke to patients mother and relayed message Labs were high. Let her know dr. Liliya Mace wanted the patient to stop the 30 mg tablet of dilantin  Mother then stated the last time patient's 30 mg tab was stopped, she has multiple seizures after 2 weeks.    Dr. Liliya Mace, please advise

## 2018-02-01 NOTE — TELEPHONE ENCOUNTER
----- Message from Trey Craig MD sent at 1/31/2018  9:50 AM EST -----  Ms. mueller is taking 5 100 mg Dilantin a day and 1 30 mg Dilantin a day, her level is a bit high I want her to stop the 30 mg.   She will continue to take the 5 100 mg tablets a day.  ----- Message -----     From: Dickson Can Lab Results In     Sent: 1/25/2018   7:43 AM       To: Trey Craig MD

## 2018-02-01 NOTE — TELEPHONE ENCOUNTER
Patient's mother notified of below advice. Patient is trying to loose weight.  Will recheck at her next office visit

## 2018-02-01 NOTE — TELEPHONE ENCOUNTER
About her lab levels. Dr Emilio Warner stated for the patient to keep at her current medication dose as of right now to not increase her risk of seizures. Mother has a question: Is this level very harmful to her and should she really consider going down on the dose?     She would like to know if there is information on the medication itself as she doesn't know the medications very well    Please advise

## 2018-02-01 NOTE — TELEPHONE ENCOUNTER
Marion Guadalupe MD sent to Dang Lopez LPN 24 minutes ago (6:12 PM)                 Continue current medications at current dose

## 2018-03-19 DIAGNOSIS — R56.9 CONVULSIONS, UNSPECIFIED CONVULSION TYPE (HCC): ICD-10-CM

## 2018-03-19 RX ORDER — EXTENDED PHENYTOIN SODIUM 30 MG/1
CAPSULE ORAL
Qty: 90 CAP | Refills: 0 | Status: ON HOLD | OUTPATIENT
Start: 2018-03-19 | End: 2019-04-08

## 2018-04-11 ENCOUNTER — TELEPHONE (OUTPATIENT)
Dept: NEUROLOGY | Age: 48
End: 2018-04-11

## 2018-04-12 ENCOUNTER — TELEPHONE (OUTPATIENT)
Dept: NEUROLOGY | Age: 48
End: 2018-04-12

## 2018-04-12 NOTE — TELEPHONE ENCOUNTER
Re: [employer] JIMÉNEZ approved 1/12/18 - 4/12/19 from [employer]. Faxed approval to [employer].    Fax confirmation received 4/12/18 @ 12:45PM.

## 2018-04-12 NOTE — TELEPHONE ENCOUNTER
Re: [employer]ish    Answers to additional PA questions and office notes 1/19/18 and 2/10/16 manually faxed to [id number] [facility]. PA pending.

## 2018-05-10 RX ORDER — TOPIRAMATE 200 MG/1
CAPSULE, EXTENDED RELEASE ORAL
Qty: 180 CAP | Refills: 3 | Status: ON HOLD | OUTPATIENT
Start: 2018-05-10 | End: 2019-04-08

## 2018-05-10 NOTE — TELEPHONE ENCOUNTER
Received refill request from [facility] pharmacy for [facility]   Last filled 1/19/18  Dr. Karuna Rodriguez refill

## 2018-06-25 DIAGNOSIS — R46.89 OUTBURSTS OF EXPLOSIVE BEHAVIOR: ICD-10-CM

## 2018-06-25 DIAGNOSIS — F41.9 ANXIETY: ICD-10-CM

## 2018-06-25 RX ORDER — FLUOXETINE HYDROCHLORIDE 20 MG/1
CAPSULE ORAL
Qty: 90 CAP | Refills: 3 | Status: ON HOLD | OUTPATIENT
Start: 2018-06-25 | End: 2019-04-08

## 2018-06-25 NOTE — TELEPHONE ENCOUNTER
Received refill request from [facility] pharmacy for Fluoxetine  Last filled 10/13/17  Follow up with Dr. Margaretmary Apley 7/17/18  Dr. Saulo dixon

## 2018-07-17 ENCOUNTER — OFFICE VISIT (OUTPATIENT)
Dept: NEUROLOGY | Age: 48
End: 2018-07-17

## 2018-07-17 VITALS
BODY MASS INDEX: 36.82 KG/M2 | SYSTOLIC BLOOD PRESSURE: 142 MMHG | WEIGHT: 221 LBS | OXYGEN SATURATION: 98 % | HEART RATE: 80 BPM | HEIGHT: 65 IN | DIASTOLIC BLOOD PRESSURE: 80 MMHG

## 2018-07-17 DIAGNOSIS — G40.309 NONINTRACTABLE GENERALIZED IDIOPATHIC EPILEPSY WITHOUT STATUS EPILEPTICUS (HCC): Primary | ICD-10-CM

## 2018-07-17 DIAGNOSIS — Q07.9 CONGENITAL ENCEPHALOPATHY (HCC): ICD-10-CM

## 2018-07-17 DIAGNOSIS — R26.89 IMBALANCE: ICD-10-CM

## 2018-07-17 PROBLEM — E66.01 SEVERE OBESITY (BMI 35.0-39.9): Status: ACTIVE | Noted: 2018-07-17

## 2018-07-17 NOTE — LETTER
7/17/2018 3:38 PM 
 
Patient:  Talon Dixon  
YOB: 1970 Date of Visit: 7/17/2018 Dear Jacinda Marques MD 
[address] [address] VIA Facsimile: [phone] 
 : Thank you for referring Ms. Agus Williamson to me for evaluation/treatment. Below are the relevant portions of my assessment and plan of care. HISTORY OF PRESENT ILLNESS Talon Dixon is a 49 y.o. female. 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 She has a seizure disorder and urinary of 2017. She is currently taking Dilantin 200 mg in the morning and 330 mg at night,  this is the extended release capsule. She is also taking Trokendi 400 mg daily. She denies hair loss. Her seizure sound partial in nature, she does not lose consciousness with them she will often have some abnormal movements of the right side and the a relatively brief 10-15 seconds. There does not appear to be a postictal. 
There have been no changes in meds, she has had no seizures since I last saw her. Review of Systems Constitutional:  
     Review of systems is negative except for current complaints. Current Outpatient Prescriptions on File Prior to Visit Medication Sig Dispense Refill  FLUoxetine (PROZAC) 20 mg capsule TAKE 1 CAPSULE BY MOUTH DAILY FOR MOOD 90 Cap 3  
 TROKENDI  mg capsule Take 2 a day BRAND MEDICALLY NECESSARY  Indications: TONIC-CLONIC EPILEPSY 180 Cap 3  
 DILANTIN 30 mg ER capsule TAKE 1 CAPSULE BY MOUTH EVERY DAY FOR 90 DAYS 90 Cap 0  phenytoin ER (DILANTIN ER) 30 mg ER capsule Take 1 Cap by mouth daily. BRAND MEDICALLY NECESSARY  Indications: epilepsy 90 Cap 3  phenytoin ER (DILANTIN ER) 100 mg ER capsule 2 every morning and 3 nightly BRAND MEDICALLY NECESSARY  Indications: Epilepsy 450 Cap 3  
 butalbital-acetaminophen-caffeine (FIORICET, ESGIC) -40 mg per tablet TAKE 1 TABLET BY MOUTH EVERY 4 HOURS AS NEEDED FOR HEADACHE OR PAIN 50 Tab 1  
 LORazepam (ATIVAN) 1 mg tablet Take 1 tab 1 hour prior to procedure Then take 1 tab at time of procedure 2 Tab 0  
 mirabegron ER (MYRBETRIQ) 25 mg ER tablet Take 25 mg by mouth daily.  NITROFURANTOIN MONOHYD/M-CRYST (MACROBID PO) Take  by mouth.  OTHER No current facility-administered medications on file prior to visit. Past Medical History:  
Diagnosis Date  Bladder incontinence Social History Social History  Marital status: SINGLE Spouse name: N/A  
 Number of children: N/A  
 Years of education: N/A Occupational History  Not on file. Social History Main Topics  Smoking status: Never Smoker  Smokeless tobacco: Never Used  Alcohol use No  
 Drug use: Not on file  Sexual activity: Not on file Other Topics Concern  Not on file Social History Narrative /80  Pulse 80  Ht 5' 5\" (1.651 m)  Wt 221 lb (100.2 kg)  SpO2 98%  BMI 36.78 kg/m2 Physical Exam  
Constitutional: She is oriented to person, place, and time. She appears well-developed and well-nourished. No distress. HENT:  
Head: Normocephalic and atraumatic. Nose: Nose normal.  
Mouth/Throat: Oropharynx is clear and moist.  
Eyes: Conjunctivae and EOM are normal. Pupils are equal, round, and reactive to light. No scleral icterus. Neurological: She is alert and oriented to person, place, and time. Coordination and gait normal. 
No nystagmus on lateral gaze Speech and language normal. 
She has mild congenital static encephalopathy Skin: Skin is warm and dry. No rash noted. She is not diaphoretic. No erythema. Psychiatric: She has a normal mood and affect. Her behavior is normal. Judgment and thought content normal.  
Vitals reviewed. ASSESSMENT and PLAN 
EPILEPSY Her epilepsy is under control on Dilantin 200/330 mg daily and Trokendi Exar 200 mg twice daily. Her levels have flirted at the upper limits of normal however the patient has no symptoms of excess medication. The mother states that every time someone tries to drop the levels the patient has seizures. I am not inclined to do so at this time. CONGENITAL STATIC ENCEPHALOPATHY Genital static encephalopathy, stable, she lives with her mother and is well cared for. Her mother is age and eventually family will have to assess Ms. Garcia long-term living plan. Her gait has always been unstable and is no different regardless of levels of the medication, I will put her into physical therapy for her annual 6 weeks with gait and balance training. This note will not be viewable in [address] If you have questions, please do not hesitate to call me. I look forward to following Ms. Espitia along with you. Sincerely, Braden Ivan MD

## 2018-07-17 NOTE — MR AVS SNAPSHOT
[address], 
[address], [address] [place] [id number] Patient: Madelyn Olmedo 
MRN: [id number] :1970 Visit Information Date & Time Provider Department Dept. Phone Encounter #  
 2018  3:00 PM Devora Sparrow MD [facility]rology [facility] at [facility] [phone] [id number] Follow-up Instructions Return in about 6 months (around 2019). Your Appointments 2019  2:40 PM  
Follow Up with Devora Sparrow MD  
Neurology Clinic at [facility] [address]) Appt Note: follow up seizyres $ 0 Cp jll 18  
 [address] [place] [address], 
[address] [place] [address], [address] [address]  
[address] [facility], [address] [place] [address], [address] [place] [address] St [address] [address] Upcoming Health Maintenance Date Due DTaP/Tdap/Td series (1 - Tdap) 1990 PAP AKA CERVICAL CYTOLOGY 1990 MEDICARE YEARLY EXAM 3/14/2018 Influenza Age 5 to Adult 2018 Allergies as of 2018  Review Complete On: 2018 By: Devora Sparrow MD  
  
 Severity Noted Reaction Type Reactions Adhesive Tape-silicones      Other (comments) Current Immunizations  Reviewed on 12/3/2013 No immunizations on file. Not reviewed this visit You Were Diagnosed With   
  
 Codes Comments Nonintractable generalized idiopathic epilepsy without status epilepticus ([facility]    -  Primary ICD-10-CM: K66.481 ICD-9-CM: 345.90 Congenital encephalopathy ([facility]     ICD-10-CM: Q07.9 ICD-9-CM: 742.9 Imbalance     ICD-10-CM: R26.89 
ICD-9-CM: 781. 2 Vitals BP Pulse Height(growth percentile) Weight(growth percentile) SpO2 BMI  
 142/80 80 5' 5\" (1.651 m) 221 lb (100.2 kg) 98% 36.78 kg/m2 OB Status Smoking Status Injection Never Smoker BMI and BSA Data Body Mass Index Body Surface Area 36.78 kg/m 2 2.14 m 2 Preferred Pharmacy Pharmacy Name [phone] [address] , [facility] AT [place] [address] [phone] Your Updated Medication List  
  
   
This list is accurate as of 7/17/18  3:33 PM.  Always use your most recent med list.  
  
  
  
  
 butalbital-acetaminophen-caffeine -40 mg per tablet Commonly known as:  FIORICET, ESGIC  
TAKE 1 TABLET BY MOUTH EVERY 4 HOURS AS NEEDED FOR HEADACHE OR PAIN  
  
 FLUoxetine 20 mg capsule Commonly known as:  PROzac TAKE 1 CAPSULE BY MOUTH DAILY FOR MOOD LORazepam 1 mg tablet Commonly known as:  ATIVAN Take 1 tab 1 hour prior to procedure  Then take 1 tab at time of procedure MACROBID PO Take  by mouth.  
  
 mirabegron ER 25 mg ER tablet Commonly known as:  MYRBETRIQ Take 25 mg by mouth daily. OTHER * phenytoin  mg ER capsule Commonly known as:  DILANTIN ER  
2 every morning and 3 nightly BRAND MEDICALLY NECESSARY  Indications: Epilepsy * DILANTIN 30 mg ER capsule Generic drug:  phenytoin ER  
TAKE 1 CAPSULE BY MOUTH EVERY DAY FOR 90 DAYS * phenytoin ER 30 mg ER capsule Commonly known as:  DILANTIN ER Take 1 Cap by mouth daily. BRAND MEDICALLY NECESSARY  Indications: epilepsy TROKENDI  mg capsule Generic drug:  topiramate ER Take 2 a day BRAND MEDICALLY NECESSARY  Indications: TONIC-CLONIC EPILEPSY * Notice: This list has 3 medication(s) that are the same as other medications prescribed for you. Read the directions carefully, and ask your doctor or other care provider to review them with you. We Performed the Following PHENYTOIN [id number] CPT(R)] REFERRAL TO PHYSICAL THERAPY [NYA76 Custom] Comments:  
 Referral to [facility] for balance and gait training. TOPIRAMATE [94439 CPT(R)] Follow-up Instructions Return in about 6 months (around 1/17/2019). Referral Information Referral ID Referred By Referred To  
  
 [id number] Kati Delarosa Not Available Visits Status Start Date End Date 1 New Request 7/17/18 7/17/19 If your referral has a status of pending review or denied, additional information will be sent to support the outcome of this decision. Patient Instructions Office Policies Phone calls/patient messages: · Please allow up to 24 hours for someone in the office to contact you about your call or message. Be mindful your provider may be out of the office or your message may require further review. We encourage you to use infoBizz for your messages as this is a faster, more efficient way to communicate with our office Medication Refills: · Prescription medications require up to 48 business hours to process. We encourage you to use infoBizz for your refills. · For controlled medications: Please allow up to 72 business hours to process. Certain medications may require you to  a written prescription at our office. · NO narcotic/controlled medications will be prescribed after 4pm Monday through Friday or on weekends Form/Paperwork Completion: · Please note there is a $25 fee for all paperwork completed by our providers. We ask that you allow 7-14 business days. Pre-payment is due prior to picking up/faxing the completed form. You may also download your forms to infoBizz to have your doctor print off. A Healthy Lifestyle: Care Instructions Your Care Instructions A healthy lifestyle can help you feel good, stay at a healthy weight, and have plenty of energy for both work and play. A healthy lifestyle is something you can share with your whole family. A healthy lifestyle also can lower your risk for serious health problems, such as high blood pressure, heart disease, and diabetes. You can follow a few steps listed below to improve your health and the health of your family. Follow-up care is a key part of your treatment and safety. Be sure to make and go to all appointments, and call your doctor if you are having problems. It's also a good idea to know your test results and keep a list of the medicines you take. How can you care for yourself at home? · Do not eat too much sugar, fat, or fast foods. You can still have dessert and treats now and then. The goal is moderation. · Start small to improve your eating habits. Pay attention to portion sizes, drink less juice and soda pop, and eat more fruits and vegetables. ¨ Eat a healthy amount of food. A 3-ounce serving of meat, for example, is about the size of a deck of cards. Fill the rest of your plate with vegetables and whole grains. ¨ Limit the amount of soda and sports drinks you have every day. Drink more water when you are thirsty. ¨ Eat at least 5 servings of fruits and vegetables every day. It may seem like a lot, but it is not hard to reach this goal. A serving or helping is 1 piece of fruit, 1 cup of vegetables, or 2 cups of leafy, raw vegetables. Have an apple or some carrot sticks as an afternoon snack instead of a candy bar. Try to have fruits and/or vegetables at every meal. 
· Make exercise part of your daily routine. You may want to start with simple activities, such as walking, bicycling, or slow swimming. Try to be active 30 to 60 minutes every day. You do not need to do all 30 to 60 minutes all at once. For example, you can exercise 3 times a day for 10 or 20 minutes. Moderate exercise is safe for most people, but it is always a good idea to talk to your doctor before starting an exercise program. 
· Keep moving. [place] [employer] the lawn, work in the garden, or [employer]. Take the stairs instead of the elevator at work. · If you smoke, quit.  People who smoke have an increased risk for heart attack, stroke, cancer, and other lung illnesses. Quitting is hard, but there are ways to boost your chance of quitting tobacco for good. ¨ Use nicotine gum, patches, or lozenges. ¨ Ask your doctor about stop-smoking programs and medicines. ¨ Keep trying. In addition to reducing your risk of diseases in the future, you will notice some benefits soon after you stop using tobacco. If you have shortness of breath or asthma symptoms, they will likely get better within a few weeks after you quit. · Limit how much alcohol you drink. Moderate amounts of alcohol (up to 2 drinks a day for men, 1 drink a day for women) are okay. But drinking too much can lead to liver problems, high blood pressure, and other health problems. Family health If you have a family, there are many things you can do together to improve your health. · Eat meals together as a family as often as possible. · Eat healthy foods. This includes fruits, vegetables, lean meats and dairy, and whole grains. · Include your family in your fitness plan. Most people think of activities such as jogging or tennis as the way to fitness, but there are many ways you and your family can be more active. Anything that makes you breathe hard and gets your heart pumping is exercise. Here are some tips: 
¨ Walk to do errands or to take your child to school or the bus. ¨ Go for a family bike ride after dinner instead of watching TV. Where can you learn more? Go to http://manju-haritha.info/. Enter J494 in the search box to learn more about \"A Healthy Lifestyle: Care Instructions. \" Current as of: December 7, 2017 Content Version: 11.7 © 3541-9114 [employer]. Care instructions adapted under license by [employer] (which disclaims liability or warranty for this information).  If you have questions about a medical condition or this instruction, always ask your healthcare professional. [employer] disclaims any warranty or liability for your use of this information. Introducing [facility] & HEALTH SERVICES! [facility] introduces [employer] patient portal. Now you can access parts of your medical record, email your doctor's office, and request medication refills online. 1. In your internet browser, go to [email or web address] [email or web address] [employer] 2. Click on the First Time User? Click Here link in the Sign In box. You will see the New Member Sign Up page. 3. Enter your [employer] Access Code exactly as it appears below. You will not need to use this code after youve completed the sign-up process. If you do not sign up before the expiration date, you must request a new code. · [employer] Access Code: [email or web address] Expires: 10/15/2018  3:33 PM 
 
4. Enter the last four digits of your Social Security Number (xxxx) and Date of Birth (mm/dd/yyyy) as indicated and click Submit. You will be taken to the next sign-up page. 5. Create a [employer] ID. This will be your [employer] login ID and cannot be changed, so think of one that is secure and easy to remember. 6. Create a [employer] password. You can change your password at any time. 7. Enter your Password Reset Question and Answer. This can be used at a later time if you forget your password. 8. Enter your e-mail address. You will receive e-mail notification when new information is available in [address] 9. Click Sign Up. You can now view and download portions of your medical record. 10. Click the Download Summary menu link to download a portable copy of your medical information. If you have questions, please visit the Frequently Asked Questions section of the [employer] website. Remember, [employer] is NOT to be used for urgent needs. For medical emergencies, dial 911. Now available from your iPhone and Android! Please provide this summary of care documentation to your next provider. Your primary care clinician is listed as Cindy De Dios. If you have any questions after today's visit, please call [phone].

## 2018-07-17 NOTE — PROGRESS NOTES
HISTORY OF PRESENT ILLNESS  Madelyn Olmedo is a 49 y.o. female. 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 She has a seizure disorder and urinary of 2017. She is currently taking Dilantin 200 mg in the morning and 330 mg at night,  this is the extended release capsule. She is also taking Trokendi 400 mg daily. She denies hair loss. Her seizure sound partial in nature, she does not lose consciousness with them she will often have some abnormal movements of the right side and the a relatively brief 10-15 seconds. There does not appear to be a postictal.  There have been no changes in meds, she has had no seizures since I last saw her. Review of Systems   Constitutional:        Review of systems is negative except for current complaints. Current Outpatient Prescriptions on File Prior to Visit   Medication Sig Dispense Refill    FLUoxetine (PROZAC) 20 mg capsule TAKE 1 CAPSULE BY MOUTH DAILY FOR MOOD 90 Cap 3    TROKENDI  mg capsule Take 2 a day BRAND MEDICALLY NECESSARY  Indications: TONIC-CLONIC EPILEPSY 180 Cap 3    DILANTIN 30 mg ER capsule TAKE 1 CAPSULE BY MOUTH EVERY DAY FOR 90 DAYS 90 Cap 0    phenytoin ER (DILANTIN ER) 30 mg ER capsule Take 1 Cap by mouth daily. BRAND MEDICALLY NECESSARY  Indications: epilepsy 90 Cap 3    phenytoin ER (DILANTIN ER) 100 mg ER capsule 2 every morning and 3 nightly BRAND MEDICALLY NECESSARY  Indications: Epilepsy 450 Cap 3    butalbital-acetaminophen-caffeine (FIORICET, ESGIC) -40 mg per tablet TAKE 1 TABLET BY MOUTH EVERY 4 HOURS AS NEEDED FOR HEADACHE OR PAIN 50 Tab 1    LORazepam (ATIVAN) 1 mg tablet Take 1 tab 1 hour prior to procedure   Then take 1 tab at time of procedure 2 Tab 0    mirabegron ER (MYRBETRIQ) 25 mg ER tablet Take 25 mg by mouth daily.  NITROFURANTOIN MONOHYD/M-CRYST (MACROBID PO) Take  by mouth.       OTHER        No current facility-administered medications on file prior to visit. Past Medical History:   Diagnosis Date    Bladder incontinence      Social History     Social History    Marital status: SINGLE     Spouse name: N/A    Number of children: N/A    Years of education: N/A     Occupational History    Not on file. Social History Main Topics    Smoking status: Never Smoker    Smokeless tobacco: Never Used    Alcohol use No    Drug use: Not on file    Sexual activity: Not on file     Other Topics Concern    Not on file     Social History Narrative     /80  Pulse 80  Ht 5' 5\" (1.651 m)  Wt 221 lb (100.2 kg)  SpO2 98%  BMI 36.78 kg/m2    Physical Exam   Constitutional: She is oriented to person, place, and time. She appears well-developed and well-nourished. No distress. HENT:   Head: Normocephalic and atraumatic. Nose: Nose normal.   Mouth/Throat: Oropharynx is clear and moist.   Eyes: Conjunctivae and EOM are normal. Pupils are equal, round, and reactive to light. No scleral icterus. Neurological: She is alert and oriented to person, place, and time. Coordination and gait normal.  No nystagmus on lateral gaze  Speech and language normal.  She has mild congenital static encephalopathy     Skin: Skin is warm and dry. No rash noted. She is not diaphoretic. No erythema. Psychiatric: She has a normal mood and affect. Her behavior is normal. Judgment and thought content normal.   Vitals reviewed. ASSESSMENT and PLAN  EPILEPSY  Her epilepsy is under control on Dilantin 200/330 mg daily and Trokendi Exar 200 mg twice daily. Her levels have flirted at the upper limits of normal however the patient has no symptoms of excess medication. The mother states that every time someone tries to drop the levels the patient has seizures. I am not inclined to do so at this time. CONGENITAL STATIC ENCEPHALOPATHY  Genital static encephalopathy, stable, she lives with her mother and is well cared for.   Her mother is age and eventually family will have to assess Ms. Garcia long-term living plan. Her gait has always been unstable and is no different regardless of levels of the medication, I will put her into physical therapy for her annual 6 weeks with gait and balance training. This note will not be viewable in [address]

## 2018-07-17 NOTE — PATIENT INSTRUCTIONS
Office Policies        Phone calls/patient messages:    · Please allow up to 24 hours for someone in the office to contact you about your call or message. Be mindful your provider may be out of the office or your message may require further review. We encourage you to use [email or web address] for your messages as this is a faster, more efficient way to communicate with our office           Medication Refills:    · Prescription medications require up to 48 business hours to process. We encourage you to use [email or web address] for your refills. · For controlled medications: Please allow up to 72 business hours to process. Certain medications may require you to  a written prescription at our office. · NO narcotic/controlled medications will be prescribed after 4pm Monday through Friday or on weekends              Form/Paperwork Completion:    · Please note there is a $25 fee for all paperwork completed by our providers. We ask that you allow 7-14 business days. Pre-payment is due prior to picking up/faxing the completed form. You may also download your forms to [email or web address] to have your doctor print off. A Healthy Lifestyle: Care Instructions  Your Care Instructions    A healthy lifestyle can help you feel good, stay at a healthy weight, and have plenty of energy for both work and play. A healthy lifestyle is something you can share with your whole family. A healthy lifestyle also can lower your risk for serious health problems, such as high blood pressure, heart disease, and diabetes. You can follow a few steps listed below to improve your health and the health of your family. Follow-up care is a key part of your treatment and safety. Be sure to make and go to all appointments, and call your doctor if you are having problems. It's also a good idea to know your test results and keep a list of the medicines you take. How can you care for yourself at home? · Do not eat too much sugar, fat, or fast foods.  You can still have dessert and treats now and then. The goal is moderation. · Start small to improve your eating habits. Pay attention to portion sizes, drink less juice and soda pop, and eat more fruits and vegetables. ¨ Eat a healthy amount of food. A 3-ounce serving of meat, for example, is about the size of a deck of cards. Fill the rest of your plate with vegetables and whole grains. ¨ Limit the amount of soda and sports drinks you have every day. Drink more water when you are thirsty. ¨ Eat at least 5 servings of fruits and vegetables every day. It may seem like a lot, but it is not hard to reach this goal. A serving or helping is 1 piece of fruit, 1 cup of vegetables, or 2 cups of leafy, raw vegetables. Have an apple or some carrot sticks as an afternoon snack instead of a candy bar. Try to have fruits and/or vegetables at every meal.  · Make exercise part of your daily routine. You may want to start with simple activities, such as walking, bicycling, or slow swimming. Try to be active 30 to 60 minutes every day. You do not need to do all 30 to 60 minutes all at once. For example, you can exercise 3 times a day for 10 or 20 minutes. Moderate exercise is safe for most people, but it is always a good idea to talk to your doctor before starting an exercise program.  · Keep moving. [place]  the lawn, work in the garden, or [employer]. Take the stairs instead of the elevator at work. · If you smoke, quit. People who smoke have an increased risk for heart attack, stroke, cancer, and other lung illnesses. Quitting is hard, but there are ways to boost your chance of quitting tobacco for good. ¨ Use nicotine gum, patches, or lozenges. ¨ Ask your doctor about stop-smoking programs and medicines. ¨ Keep trying.   In addition to reducing your risk of diseases in the future, you will notice some benefits soon after you stop using tobacco. If you have shortness of breath or asthma symptoms, they will likely get better within a few weeks after you quit. · Limit how much alcohol you drink. Moderate amounts of alcohol (up to 2 drinks a day for men, 1 drink a day for women) are okay. But drinking too much can lead to liver problems, high blood pressure, and other health problems. Family health  If you have a family, there are many things you can do together to improve your health. · Eat meals together as a family as often as possible. · Eat healthy foods. This includes fruits, vegetables, lean meats and dairy, and whole grains. · Include your family in your fitness plan. Most people think of activities such as jogging or tennis as the way to fitness, but there are many ways you and your family can be more active. Anything that makes you breathe hard and gets your heart pumping is exercise. Here are some tips:  ¨ Walk to do errands or to take your child to school or the bus. ¨ Go for a family bike ride after dinner instead of watching TV. Where can you learn more? Go to http://manju-haritha.info/. Enter V950 in the search box to learn more about \"A Healthy Lifestyle: Care Instructions. \"  Current as of: December 7, 2017  Content Version: 11.7  © 6431-3279 [employer]. Care instructions adapted under license by [employer] (which disclaims liability or warranty for this information). If you have questions about a medical condition or this instruction, always ask your healthcare professional. [place] [address] any warranty or liability for your use of this information.

## 2018-07-19 ENCOUNTER — DOCUMENTATION ONLY (OUTPATIENT)
Dept: NEUROLOGY | Age: 48
End: 2018-07-19

## 2018-07-19 ENCOUNTER — TELEPHONE (OUTPATIENT)
Dept: NEUROLOGY | Age: 48
End: 2018-07-19

## 2018-07-19 DIAGNOSIS — G40.309 NONINTRACTABLE GENERALIZED IDIOPATHIC EPILEPSY WITHOUT STATUS EPILEPTICUS (HCC): Primary | ICD-10-CM

## 2018-07-19 LAB
PHENYTOIN SERPL-MCNC: 32.8 UG/ML (ref 10–20)
TOPIRAMATE SERPL-MCNC: 3.4 UG/ML (ref 2–25)

## 2018-07-19 NOTE — TELEPHONE ENCOUNTER
----- Message from Tanmay Lopez MD sent at 7/19/2018  8:46 AM EDT -----  I would like Asim to stop her 30 mg capsule of Dilantin daily, she will continue on the 100 mg capsules taking 5 a day.   I would like her Dilantin level free and total rechecked in 1 month, I will order it  ----- Message -----     From: Laquita Cancomckinley Lab Results In     Sent: 7/18/2018   4:57 PM       To: Tanmay Lopez MD

## 2018-07-19 NOTE — TELEPHONE ENCOUNTER
Dr. Starr Lobo want to be sure we have the right patient , patient name is Rosemarie , not  Brielle .    Please clarify

## 2018-07-24 NOTE — TELEPHONE ENCOUNTER
Lab slip and directions from Dr. Brewer All mailed to patient  7/24/18  after trying to get patient or mother by phone

## 2018-07-26 RX ORDER — PHENYTOIN SODIUM 100 MG/1
CAPSULE, EXTENDED RELEASE ORAL
Qty: 450 CAP | Refills: 3 | Status: ON HOLD | OUTPATIENT
Start: 2018-07-26 | End: 2019-04-08

## 2018-07-26 NOTE — TELEPHONE ENCOUNTER
Yaritza please see if patient's trkendi needs PA   Thanks   looks like last PA in media was good until 3/30/2018

## 2018-07-26 NOTE — TELEPHONE ENCOUNTER
----- Message from Elias Santoro sent at 7/26/2018  1:02 PM EDT -----  Regarding: Jimena/telephone  Pts mother Zabrina Washington is requesting for you to call her in regard too a medications Trokendi. She stated the insurance will not cover the medication. Mothers number is [phone] and [place] number is [phone]. She will run out of the medication in a week.

## 2018-07-27 ENCOUNTER — TELEPHONE (OUTPATIENT)
Dept: NEUROLOGY | Age: 48
End: 2018-07-27

## 2018-07-27 NOTE — TELEPHONE ENCOUNTER
Re: [employer] -per pharmacist -- it is too soon to fill - only reason for rejecting. Patient picked up a 90 day supply on 5/10 and will be able to  the next 90 day supply on Monday, 7/30. Same scenario for the Dilantin - too soon to fill.

## 2018-07-27 NOTE — TELEPHONE ENCOUNTER
Re:  Trokendi PA request rec'd from [employer]. Message from Plan    The patient currently has access to the requested medication and a Prior Authorization is not needed for the patient/medication. Faxed this reply to [employer].

## 2018-08-24 NOTE — PROGRESS NOTES
Her free and her bound Dilantin levels are little high however she appears to tolerate them and is doing well from a seizure standpoint so I do not want to change dosing at this time

## 2018-08-25 LAB
PHENYTOIN FREE SERPL-MCNC: 2.8 UG/ML (ref 1–2)
PHENYTOIN SERPL-MCNC: 26.8 UG/ML (ref 10–20)

## 2019-01-24 ENCOUNTER — OFFICE VISIT (OUTPATIENT)
Dept: NEUROLOGY | Age: 49
End: 2019-01-24

## 2019-01-24 VITALS
OXYGEN SATURATION: 99 % | HEART RATE: 73 BPM | HEIGHT: 65 IN | BODY MASS INDEX: 37.65 KG/M2 | WEIGHT: 226 LBS | SYSTOLIC BLOOD PRESSURE: 124 MMHG | DIASTOLIC BLOOD PRESSURE: 80 MMHG

## 2019-01-24 DIAGNOSIS — G40.309 NONINTRACTABLE GENERALIZED IDIOPATHIC EPILEPSY WITHOUT STATUS EPILEPTICUS (HCC): Primary | ICD-10-CM

## 2019-01-24 NOTE — PROGRESS NOTES
This note will not be viewable in [address] HISTORY OF PRESENT ILLNESS  Hien Mahan is a 51 y.o. female. 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 She has a seizure disorder and urinary of 2017. She is currently taking Dilantin 200 mg in the morning and 330 mg at night,  this is the extended release capsule. She is also taking Trokendi 400 mg daily. She denies hair loss. Her seizure sound partial in nature, she does not lose consciousness with them she will often have some abnormal movements of the right side and the a relatively brief 10-15 seconds. There does not appear to be a postictal confusion. Her mother states she sees what sound like partial seizures. Her bone scan done last year was normal.      Review of Systems   Constitutional:        Review of systems is negative except for current complaints. Current Outpatient Medications on File Prior to Visit   Medication Sig Dispense Refill    phenytoin ER (DILANTIN ER) 30 mg ER capsule Take 1 Cap by mouth daily. BRAND MEDICALLY NECESSARY  Indications: epilepsy 90 Cap 3    phenytoin ER (DILANTIN ER) 100 mg ER capsule 2 every morning and 3 nightly BRAND MEDICALLY NECESSARY  Indications: epilepsy 450 Cap 3    FLUoxetine (PROZAC) 20 mg capsule TAKE 1 CAPSULE BY MOUTH DAILY FOR MOOD 90 Cap 3    TROKENDI  mg capsule Take 2 a day BRAND MEDICALLY NECESSARY  Indications: TONIC-CLONIC EPILEPSY 180 Cap 3    DILANTIN 30 mg ER capsule TAKE 1 CAPSULE BY MOUTH EVERY DAY FOR 90 DAYS 90 Cap 0    butalbital-acetaminophen-caffeine (FIORICET, ESGIC) -40 mg per tablet TAKE 1 TABLET BY MOUTH EVERY 4 HOURS AS NEEDED FOR HEADACHE OR PAIN 50 Tab 1    LORazepam (ATIVAN) 1 mg tablet Take 1 tab 1 hour prior to procedure   Then take 1 tab at time of procedure 2 Tab 0    mirabegron ER (MYRBETRIQ) 25 mg ER tablet Take 25 mg by mouth daily.  NITROFURANTOIN MONOHYD/M-CRYST (MACROBID PO) Take  by mouth.       OTHER        No current facility-administered medications on file prior to visit. Past Medical History:   Diagnosis Date    Bladder incontinence      Social History     Socioeconomic History    Marital status: SINGLE     Spouse name: Not on file    Number of children: Not on file    Years of education: Not on file    Highest education level: Not on file   Social Needs    Financial resource strain: Not on file    Food insecurity - worry: Not on file    Food insecurity - inability: Not on file    Transportation needs - medical: Not on file   [employer] needs - non-medical: Not on file   Occupational History    Not on file   Tobacco Use    Smoking status: Never Smoker    Smokeless tobacco: Never Used   Substance and Sexual Activity    Alcohol use: No    Drug use: Not on file    Sexual activity: Not on file   Other Topics Concern    Not on file   Social History Narrative    Not on file     /80   Pulse 73   Ht 5' 5\" (1.651 m)   Wt 102.5 kg (226 lb)   SpO2 99%   BMI 37.61 kg/m²     Physical Exam   Constitutional: She is oriented to person, place, and time. She appears well-developed and well-nourished. No distress. HENT:   Head: Normocephalic and atraumatic. Nose: Nose normal.   Mouth/Throat: Oropharynx is clear and moist.   Eyes: Conjunctivae and EOM are normal. Pupils are equal, round, and reactive to light. No scleral icterus. Neurological: She is alert and oriented to person, place, and time. Coordination and gait normal.  No nystagmus on lateral gaze  Speech and language normal.  She has mild congenital static encephalopathy  She has mild gingival hypertrophy     Skin: Skin is warm and dry. No rash noted. She is not diaphoretic. No erythema. Psychiatric: She has a normal mood and affect. Her behavior is normal. Judgment and thought content normal.   Vitals reviewed.       ASSESSMENT and PLAN  EPILEPSY  Her epilepsy is under control on Dilantin 200/330 mg daily and Amish Exar 200 mg twice daily. Her levels have flirted at the upper limits of normal however the patient has no symptoms of excess medication. The mother states that every time someone tries to drop the levels the patient has seizures. I am not inclined to do so at this time. CONGENITAL STATIC ENCEPHALOPATHY  Genital static encephalopathy, stable, she lives with her mother and is well cared for. Her mother is age and eventually family will have to assess Ms. Garcia long-term living plan.   Her gait has always been unstable and is no different regardless of levels of the medication

## 2019-01-24 NOTE — PATIENT INSTRUCTIONS

## 2019-01-25 LAB — PHENYTOIN SERPL-MCNC: 27 UG/ML (ref 10–20)

## 2019-01-28 ENCOUNTER — TELEPHONE (OUTPATIENT)
Dept: NEUROLOGY | Age: 49
End: 2019-01-28

## 2019-02-05 ENCOUNTER — TELEPHONE (OUTPATIENT)
Dept: NEUROLOGY | Age: 49
End: 2019-02-05

## 2019-02-05 NOTE — TELEPHONE ENCOUNTER
Sent PA for Trokendi via [facility] (renewal). Last year it did not require a P. A. Submitted office notes to [address] [place] [address] Status pending. Richard JIMÉNEZ Case ID: [id number] Need help?  Call us at [phone]  Status  Sent to [facility]  Drug  Trokendi XR 200MG OR CP24  Form  [employer] Electronic PA Form

## 2019-02-06 NOTE — TELEPHONE ENCOUNTER
Rec'd Amish approval from [employer] 1/1/19 - 2/5/2020. Faxed to [facility].

## 2019-04-07 ENCOUNTER — HOSPITAL ENCOUNTER (INPATIENT)
Age: 49
LOS: 8 days | Discharge: HOME | DRG: 871 | End: 2019-04-15
Attending: EMERGENCY MEDICINE | Admitting: INTERNAL MEDICINE
Payer: MEDICARE

## 2019-04-07 ENCOUNTER — APPOINTMENT (OUTPATIENT)
Dept: GENERAL RADIOLOGY | Age: 49
DRG: 871 | End: 2019-04-07
Attending: EMERGENCY MEDICINE
Payer: MEDICARE

## 2019-04-07 ENCOUNTER — APPOINTMENT (OUTPATIENT)
Dept: CT IMAGING | Age: 49
DRG: 871 | End: 2019-04-07
Attending: EMERGENCY MEDICINE
Payer: MEDICARE

## 2019-04-07 DIAGNOSIS — N30.00 ACUTE CYSTITIS WITHOUT HEMATURIA: Primary | ICD-10-CM

## 2019-04-07 DIAGNOSIS — Z87.898 HX OF IDIOPATHIC SEIZURE: ICD-10-CM

## 2019-04-07 DIAGNOSIS — K52.9 NONINFECTIOUS GASTROENTERITIS, UNSPECIFIED TYPE: ICD-10-CM

## 2019-04-07 PROBLEM — A41.9 SEPSIS (HCC): Status: ACTIVE | Noted: 2019-04-07

## 2019-04-07 LAB
ALBUMIN SERPL-MCNC: 3.3 G/DL (ref 3.5–5)
ALBUMIN/GLOB SERPL: 0.9 {RATIO} (ref 1.1–2.2)
ALP SERPL-CCNC: 82 U/L (ref 45–117)
ALT SERPL-CCNC: 39 U/L (ref 12–78)
ANION GAP SERPL CALC-SCNC: 8 MMOL/L (ref 5–15)
APPEARANCE UR: ABNORMAL
AST SERPL-CCNC: 43 U/L (ref 15–37)
BACTERIA URNS QL MICRO: NEGATIVE /HPF
BASOPHILS # BLD: 0 K/UL (ref 0–0.1)
BASOPHILS NFR BLD: 0 % (ref 0–1)
BILIRUB SERPL-MCNC: 0.7 MG/DL (ref 0.2–1)
BILIRUB UR QL CFM: NEGATIVE
BUN SERPL-MCNC: 21 MG/DL (ref 6–20)
BUN/CREAT SERPL: 28 (ref 12–20)
CALCIUM SERPL-MCNC: 8.2 MG/DL (ref 8.5–10.1)
CHLORIDE SERPL-SCNC: 105 MMOL/L (ref 97–108)
CO2 SERPL-SCNC: 23 MMOL/L (ref 21–32)
COLOR UR: ABNORMAL
CREAT SERPL-MCNC: 0.76 MG/DL (ref 0.55–1.02)
DIFFERENTIAL METHOD BLD: ABNORMAL
EOSINOPHIL # BLD: 0.2 K/UL (ref 0–0.4)
EOSINOPHIL NFR BLD: 1 % (ref 0–7)
EPITH CASTS URNS QL MICRO: ABNORMAL /LPF
ERYTHROCYTE [DISTWIDTH] IN BLOOD BY AUTOMATED COUNT: 12.4 % (ref 11.5–14.5)
GLOBULIN SER CALC-MCNC: 3.8 G/DL (ref 2–4)
GLUCOSE SERPL-MCNC: 147 MG/DL (ref 65–100)
GLUCOSE UR STRIP.AUTO-MCNC: NEGATIVE MG/DL
HCT VFR BLD AUTO: 46.8 % (ref 35–47)
HGB BLD-MCNC: 17 G/DL (ref 11.5–16)
HGB UR QL STRIP: NEGATIVE
IMM GRANULOCYTES # BLD AUTO: 0 K/UL (ref 0–0.04)
IMM GRANULOCYTES NFR BLD AUTO: 0 % (ref 0–0.5)
KETONES UR QL STRIP.AUTO: ABNORMAL MG/DL
LACTATE SERPL-SCNC: 2.1 MMOL/L (ref 0.4–2)
LEUKOCYTE ESTERASE UR QL STRIP.AUTO: ABNORMAL
LYMPHOCYTES # BLD: 1.2 K/UL (ref 0.8–3.5)
LYMPHOCYTES NFR BLD: 5 % (ref 12–49)
MCH RBC QN AUTO: 35.1 PG (ref 26–34)
MCHC RBC AUTO-ENTMCNC: 36.3 G/DL (ref 30–36.5)
MCV RBC AUTO: 96.5 FL (ref 80–99)
MONOCYTES # BLD: 1.4 K/UL (ref 0–1)
MONOCYTES NFR BLD: 6 % (ref 5–13)
MUCOUS THREADS URNS QL MICRO: ABNORMAL /LPF
NEUTS BAND NFR BLD MANUAL: 7 %
NEUTS SEG # BLD: 20.2 K/UL (ref 1.8–8)
NEUTS SEG NFR BLD: 81 % (ref 32–75)
NITRITE UR QL STRIP.AUTO: POSITIVE
NRBC # BLD: 0 K/UL (ref 0–0.01)
NRBC BLD-RTO: 0 PER 100 WBC
PH UR STRIP: 5.5 [PH] (ref 5–8)
PLATELET # BLD AUTO: 160 K/UL (ref 150–400)
PLATELET COMMENTS,PCOM: ABNORMAL
PMV BLD AUTO: 10.3 FL (ref 8.9–12.9)
POTASSIUM SERPL-SCNC: 3 MMOL/L (ref 3.5–5.1)
PROT SERPL-MCNC: 7.1 G/DL (ref 6.4–8.2)
PROT UR STRIP-MCNC: 30 MG/DL
RBC # BLD AUTO: 4.85 M/UL (ref 3.8–5.2)
RBC #/AREA URNS HPF: ABNORMAL /HPF (ref 0–5)
RBC MORPH BLD: ABNORMAL
SODIUM SERPL-SCNC: 136 MMOL/L (ref 136–145)
SP GR UR REFRACTOMETRY: 1.03 (ref 1–1.03)
UA: UC IF INDICATED,UAUC: ABNORMAL
UROBILINOGEN UR QL STRIP.AUTO: 0.2 EU/DL (ref 0.2–1)
WBC # BLD AUTO: 23 K/UL (ref 3.6–11)
WBC MORPH BLD: ABNORMAL
WBC URNS QL MICRO: ABNORMAL /HPF (ref 0–4)

## 2019-04-07 PROCEDURE — 94761 N-INVAS EAR/PLS OXIMETRY MLT: CPT

## 2019-04-07 PROCEDURE — 87086 URINE CULTURE/COLONY COUNT: CPT

## 2019-04-07 PROCEDURE — 74011250637 HC RX REV CODE- 250/637: Performed by: INTERNAL MEDICINE

## 2019-04-07 PROCEDURE — 36415 COLL VENOUS BLD VENIPUNCTURE: CPT

## 2019-04-07 PROCEDURE — 81001 URINALYSIS AUTO W/SCOPE: CPT

## 2019-04-07 PROCEDURE — 93005 ELECTROCARDIOGRAM TRACING: CPT

## 2019-04-07 PROCEDURE — 85025 COMPLETE CBC W/AUTO DIFF WBC: CPT

## 2019-04-07 PROCEDURE — 99285 EMERGENCY DEPT VISIT HI MDM: CPT

## 2019-04-07 PROCEDURE — 65660000000 HC RM CCU STEPDOWN

## 2019-04-07 PROCEDURE — 96360 HYDRATION IV INFUSION INIT: CPT

## 2019-04-07 PROCEDURE — 83605 ASSAY OF LACTIC ACID: CPT

## 2019-04-07 PROCEDURE — 74011250636 HC RX REV CODE- 250/636: Performed by: INTERNAL MEDICINE

## 2019-04-07 PROCEDURE — 74011636320 HC RX REV CODE- 636/320: Performed by: EMERGENCY MEDICINE

## 2019-04-07 PROCEDURE — 80053 COMPREHEN METABOLIC PANEL: CPT

## 2019-04-07 PROCEDURE — 74177 CT ABD & PELVIS W/CONTRAST: CPT

## 2019-04-07 PROCEDURE — 71045 X-RAY EXAM CHEST 1 VIEW: CPT

## 2019-04-07 PROCEDURE — 74011250637 HC RX REV CODE- 250/637: Performed by: EMERGENCY MEDICINE

## 2019-04-07 PROCEDURE — 87040 BLOOD CULTURE FOR BACTERIA: CPT

## 2019-04-07 PROCEDURE — 74011250636 HC RX REV CODE- 250/636: Performed by: EMERGENCY MEDICINE

## 2019-04-07 RX ORDER — PHENYTOIN SODIUM 100 MG/1
300 CAPSULE, EXTENDED RELEASE ORAL
Status: DISCONTINUED | OUTPATIENT
Start: 2019-04-08 | End: 2019-04-08

## 2019-04-07 RX ORDER — ONDANSETRON 2 MG/ML
4 INJECTION INTRAMUSCULAR; INTRAVENOUS
Status: DISCONTINUED | OUTPATIENT
Start: 2019-04-07 | End: 2019-04-15 | Stop reason: HOSPADM

## 2019-04-07 RX ORDER — SODIUM CHLORIDE 0.9 % (FLUSH) 0.9 %
5-10 SYRINGE (ML) INJECTION AS NEEDED
Status: DISCONTINUED | OUTPATIENT
Start: 2019-04-07 | End: 2019-04-15 | Stop reason: HOSPADM

## 2019-04-07 RX ORDER — VANCOMYCIN HYDROCHLORIDE 250 MG/5ML
125 POWDER, FOR SOLUTION ORAL EVERY 6 HOURS
Status: DISCONTINUED | OUTPATIENT
Start: 2019-04-07 | End: 2019-04-09

## 2019-04-07 RX ORDER — PHENYTOIN SODIUM 100 MG/1
200 CAPSULE, EXTENDED RELEASE ORAL
Status: DISCONTINUED | OUTPATIENT
Start: 2019-04-08 | End: 2019-04-08

## 2019-04-07 RX ORDER — LEVOFLOXACIN 5 MG/ML
750 INJECTION, SOLUTION INTRAVENOUS EVERY 24 HOURS
Status: DISCONTINUED | OUTPATIENT
Start: 2019-04-07 | End: 2019-04-08

## 2019-04-07 RX ORDER — SODIUM CHLORIDE 0.9 % (FLUSH) 0.9 %
5-40 SYRINGE (ML) INJECTION AS NEEDED
Status: DISCONTINUED | OUTPATIENT
Start: 2019-04-07 | End: 2019-04-15 | Stop reason: HOSPADM

## 2019-04-07 RX ORDER — SODIUM CHLORIDE 9 MG/ML
100 INJECTION, SOLUTION INTRAVENOUS CONTINUOUS
Status: DISCONTINUED | OUTPATIENT
Start: 2019-04-07 | End: 2019-04-12

## 2019-04-07 RX ORDER — FLUOXETINE HYDROCHLORIDE 20 MG/1
20 CAPSULE ORAL DAILY
Status: DISCONTINUED | OUTPATIENT
Start: 2019-04-08 | End: 2019-04-15 | Stop reason: HOSPADM

## 2019-04-07 RX ORDER — SODIUM CHLORIDE 0.9 % (FLUSH) 0.9 %
5-40 SYRINGE (ML) INJECTION EVERY 8 HOURS
Status: DISCONTINUED | OUTPATIENT
Start: 2019-04-07 | End: 2019-04-15 | Stop reason: HOSPADM

## 2019-04-07 RX ORDER — BUTALBITAL, ACETAMINOPHEN AND CAFFEINE 50; 325; 40 MG/1; MG/1; MG/1
2 TABLET ORAL
Status: COMPLETED | OUTPATIENT
Start: 2019-04-07 | End: 2019-04-07

## 2019-04-07 RX ORDER — HEPARIN SODIUM 5000 [USP'U]/ML
5000 INJECTION, SOLUTION INTRAVENOUS; SUBCUTANEOUS EVERY 8 HOURS
Status: DISCONTINUED | OUTPATIENT
Start: 2019-04-07 | End: 2019-04-15 | Stop reason: HOSPADM

## 2019-04-07 RX ORDER — ACETAMINOPHEN 325 MG/1
650 TABLET ORAL
Status: DISCONTINUED | OUTPATIENT
Start: 2019-04-07 | End: 2019-04-15 | Stop reason: HOSPADM

## 2019-04-07 RX ORDER — DOCUSATE SODIUM 100 MG/1
100 CAPSULE, LIQUID FILLED ORAL
Status: DISCONTINUED | OUTPATIENT
Start: 2019-04-07 | End: 2019-04-15 | Stop reason: HOSPADM

## 2019-04-07 RX ORDER — POTASSIUM CHLORIDE 7.45 MG/ML
10 INJECTION INTRAVENOUS
Status: COMPLETED | OUTPATIENT
Start: 2019-04-07 | End: 2019-04-08

## 2019-04-07 RX ORDER — BUTALBITAL, ACETAMINOPHEN AND CAFFEINE 50; 325; 40 MG/1; MG/1; MG/1
1 TABLET ORAL
Status: DISCONTINUED | OUTPATIENT
Start: 2019-04-07 | End: 2019-04-15 | Stop reason: HOSPADM

## 2019-04-07 RX ORDER — SODIUM CHLORIDE 0.9 % (FLUSH) 0.9 %
10 SYRINGE (ML) INJECTION
Status: COMPLETED | OUTPATIENT
Start: 2019-04-07 | End: 2019-04-07

## 2019-04-07 RX ADMIN — SODIUM CHLORIDE 1000 ML: 900 INJECTION, SOLUTION INTRAVENOUS at 23:11

## 2019-04-07 RX ADMIN — SODIUM CHLORIDE 1000 ML: 900 INJECTION, SOLUTION INTRAVENOUS at 21:21

## 2019-04-07 RX ADMIN — LEVOFLOXACIN 750 MG: 5 INJECTION, SOLUTION INTRAVENOUS at 22:35

## 2019-04-07 RX ADMIN — Medication 10 ML: at 23:55

## 2019-04-07 RX ADMIN — BUTALBITAL, ACETAMINOPHEN AND CAFFEINE 1 TABLET: 50; 325; 40 TABLET ORAL at 22:26

## 2019-04-07 RX ADMIN — ACETAMINOPHEN 650 MG: 325 TABLET ORAL at 23:50

## 2019-04-07 RX ADMIN — IOPAMIDOL 100 ML: 755 INJECTION, SOLUTION INTRAVENOUS at 22:25

## 2019-04-07 RX ADMIN — Medication 10 ML: at 22:25

## 2019-04-08 PROBLEM — Z79.1 NSAID LONG-TERM USE: Status: ACTIVE | Noted: 2019-04-08

## 2019-04-08 PROBLEM — K52.9 COLITIS: Status: ACTIVE | Noted: 2019-04-08

## 2019-04-08 PROBLEM — K92.1 BLOOD IN STOOL: Status: ACTIVE | Noted: 2019-04-08

## 2019-04-08 PROBLEM — R19.7 DIARRHEA: Status: ACTIVE | Noted: 2019-04-08

## 2019-04-08 LAB
ANION GAP SERPL CALC-SCNC: 8 MMOL/L (ref 5–15)
ATRIAL RATE: 97 BPM
BASOPHILS # BLD: 0 K/UL (ref 0–0.1)
BASOPHILS NFR BLD: 0 % (ref 0–1)
BUN SERPL-MCNC: 17 MG/DL (ref 6–20)
BUN/CREAT SERPL: 31 (ref 12–20)
CALCIUM SERPL-MCNC: 7.2 MG/DL (ref 8.5–10.1)
CALCULATED P AXIS, ECG09: 29 DEGREES
CALCULATED R AXIS, ECG10: 104 DEGREES
CALCULATED T AXIS, ECG11: 27 DEGREES
CHLORIDE SERPL-SCNC: 112 MMOL/L (ref 97–108)
CO2 SERPL-SCNC: 20 MMOL/L (ref 21–32)
CREAT SERPL-MCNC: 0.55 MG/DL (ref 0.55–1.02)
DIAGNOSIS, 93000: NORMAL
DIFFERENTIAL METHOD BLD: ABNORMAL
EOSINOPHIL # BLD: 0 K/UL (ref 0–0.4)
EOSINOPHIL NFR BLD: 0 % (ref 0–7)
ERYTHROCYTE [DISTWIDTH] IN BLOOD BY AUTOMATED COUNT: 12.6 % (ref 11.5–14.5)
GLUCOSE SERPL-MCNC: 118 MG/DL (ref 65–100)
HCT VFR BLD AUTO: 40 % (ref 35–47)
HEMOCCULT STL QL: POSITIVE
HGB BLD-MCNC: 13.9 G/DL (ref 11.5–16)
IMM GRANULOCYTES # BLD AUTO: 0 K/UL (ref 0–0.04)
IMM GRANULOCYTES NFR BLD AUTO: 0 % (ref 0–0.5)
LACTATE BLD-SCNC: 2.19 MMOL/L (ref 0.4–2)
LYMPHOCYTES # BLD: 1.8 K/UL (ref 0.8–3.5)
LYMPHOCYTES NFR BLD: 11 % (ref 12–49)
MCH RBC QN AUTO: 34.9 PG (ref 26–34)
MCHC RBC AUTO-ENTMCNC: 34.8 G/DL (ref 30–36.5)
MCV RBC AUTO: 100.5 FL (ref 80–99)
MONOCYTES # BLD: 1.2 K/UL (ref 0–1)
MONOCYTES NFR BLD: 7 % (ref 5–13)
NEUTS BAND NFR BLD MANUAL: 3 %
NEUTS SEG # BLD: 13.7 K/UL (ref 1.8–8)
NEUTS SEG NFR BLD: 79 % (ref 32–75)
NRBC # BLD: 0 K/UL (ref 0–0.01)
NRBC BLD-RTO: 0 PER 100 WBC
P-R INTERVAL, ECG05: 142 MS
PHENYTOIN SERPL-MCNC: 25.8 UG/ML (ref 10–20)
PLATELET # BLD AUTO: 138 K/UL (ref 150–400)
PMV BLD AUTO: 10.2 FL (ref 8.9–12.9)
POTASSIUM SERPL-SCNC: 3.6 MMOL/L (ref 3.5–5.1)
Q-T INTERVAL, ECG07: 438 MS
QRS DURATION, ECG06: 86 MS
QTC CALCULATION (BEZET), ECG08: 556 MS
RBC # BLD AUTO: 3.98 M/UL (ref 3.8–5.2)
RBC MORPH BLD: ABNORMAL
SODIUM SERPL-SCNC: 140 MMOL/L (ref 136–145)
VENTRICULAR RATE, ECG03: 97 BPM
WBC # BLD AUTO: 16.7 K/UL (ref 3.6–11)
WBC #/AREA STL HPF: >20 /HPF (ref 0–4)

## 2019-04-08 PROCEDURE — 74011250636 HC RX REV CODE- 250/636: Performed by: INTERNAL MEDICINE

## 2019-04-08 PROCEDURE — 74011250637 HC RX REV CODE- 250/637: Performed by: INTERNAL MEDICINE

## 2019-04-08 PROCEDURE — 36415 COLL VENOUS BLD VENIPUNCTURE: CPT

## 2019-04-08 PROCEDURE — 87177 OVA AND PARASITES SMEARS: CPT

## 2019-04-08 PROCEDURE — 82272 OCCULT BLD FECES 1-3 TESTS: CPT

## 2019-04-08 PROCEDURE — 80048 BASIC METABOLIC PNL TOTAL CA: CPT

## 2019-04-08 PROCEDURE — 94760 N-INVAS EAR/PLS OXIMETRY 1: CPT

## 2019-04-08 PROCEDURE — 65660000000 HC RM CCU STEPDOWN

## 2019-04-08 PROCEDURE — 85025 COMPLETE CBC W/AUTO DIFF WBC: CPT

## 2019-04-08 PROCEDURE — 87045 FECES CULTURE AEROBIC BACT: CPT

## 2019-04-08 PROCEDURE — 89055 LEUKOCYTE ASSESSMENT FECAL: CPT

## 2019-04-08 PROCEDURE — 74011000258 HC RX REV CODE- 258: Performed by: INTERNAL MEDICINE

## 2019-04-08 PROCEDURE — 80185 ASSAY OF PHENYTOIN TOTAL: CPT

## 2019-04-08 PROCEDURE — 87449 NOS EACH ORGANISM AG IA: CPT

## 2019-04-08 RX ORDER — PHENYTOIN SODIUM 100 MG/1
330 CAPSULE, EXTENDED RELEASE ORAL
COMMUNITY
End: 2019-06-20 | Stop reason: SDUPTHER

## 2019-04-08 RX ORDER — PHENYTOIN SODIUM 100 MG/1
200 CAPSULE, EXTENDED RELEASE ORAL DAILY
Status: DISCONTINUED | OUTPATIENT
Start: 2019-04-08 | End: 2019-04-15 | Stop reason: HOSPADM

## 2019-04-08 RX ORDER — FLUOXETINE HYDROCHLORIDE 20 MG/1
20 CAPSULE ORAL
COMMUNITY
End: 2019-07-29 | Stop reason: SDUPTHER

## 2019-04-08 RX ORDER — OXYCODONE HYDROCHLORIDE 5 MG/1
5 TABLET ORAL
Status: DISCONTINUED | OUTPATIENT
Start: 2019-04-08 | End: 2019-04-15 | Stop reason: HOSPADM

## 2019-04-08 RX ORDER — HYDROMORPHONE HYDROCHLORIDE 1 MG/ML
1 INJECTION, SOLUTION INTRAMUSCULAR; INTRAVENOUS; SUBCUTANEOUS
Status: DISCONTINUED | OUTPATIENT
Start: 2019-04-08 | End: 2019-04-15 | Stop reason: HOSPADM

## 2019-04-08 RX ORDER — PHENYTOIN SODIUM 100 MG/1
200 CAPSULE, EXTENDED RELEASE ORAL DAILY
COMMUNITY
End: 2019-06-20 | Stop reason: SDUPTHER

## 2019-04-08 RX ADMIN — MIRABEGRON 25 MG: 25 TABLET, FILM COATED, EXTENDED RELEASE ORAL at 09:24

## 2019-04-08 RX ADMIN — VANCOMYCIN HYDROCHLORIDE 125 MG: KIT at 17:01

## 2019-04-08 RX ADMIN — Medication 10 ML: at 14:35

## 2019-04-08 RX ADMIN — HEPARIN SODIUM 5000 UNITS: 5000 INJECTION INTRAVENOUS; SUBCUTANEOUS at 09:20

## 2019-04-08 RX ADMIN — BUTALBITAL, ACETAMINOPHEN AND CAFFEINE 1 TABLET: 50; 325; 40 TABLET ORAL at 07:30

## 2019-04-08 RX ADMIN — VANCOMYCIN HYDROCHLORIDE 125 MG: KIT at 00:52

## 2019-04-08 RX ADMIN — ACETAMINOPHEN 650 MG: 325 TABLET ORAL at 12:41

## 2019-04-08 RX ADMIN — SODIUM CHLORIDE 1000 ML: 900 INJECTION, SOLUTION INTRAVENOUS at 02:39

## 2019-04-08 RX ADMIN — FLUOXETINE HYDROCHLORIDE 20 MG: 20 CAPSULE ORAL at 09:19

## 2019-04-08 RX ADMIN — PHENYTOIN SODIUM 200 MG: 100 CAPSULE, EXTENDED RELEASE ORAL at 09:22

## 2019-04-08 RX ADMIN — POTASSIUM CHLORIDE 10 MEQ: 10 INJECTION, SOLUTION INTRAVENOUS at 03:10

## 2019-04-08 RX ADMIN — BUTALBITAL, ACETAMINOPHEN AND CAFFEINE 1 TABLET: 50; 325; 40 TABLET ORAL at 17:01

## 2019-04-08 RX ADMIN — HYDROMORPHONE HYDROCHLORIDE 1 MG: 1 INJECTION, SOLUTION INTRAMUSCULAR; INTRAVENOUS; SUBCUTANEOUS at 19:50

## 2019-04-08 RX ADMIN — SODIUM CHLORIDE 1000 ML: 900 INJECTION, SOLUTION INTRAVENOUS at 01:13

## 2019-04-08 RX ADMIN — VANCOMYCIN HYDROCHLORIDE 125 MG: KIT at 05:43

## 2019-04-08 RX ADMIN — POTASSIUM CHLORIDE 10 MEQ: 10 INJECTION, SOLUTION INTRAVENOUS at 00:55

## 2019-04-08 RX ADMIN — SODIUM CHLORIDE 100 ML/HR: 900 INJECTION, SOLUTION INTRAVENOUS at 04:17

## 2019-04-08 RX ADMIN — POTASSIUM CHLORIDE 10 MEQ: 10 INJECTION, SOLUTION INTRAVENOUS at 01:58

## 2019-04-08 RX ADMIN — SODIUM CHLORIDE 100 ML/HR: 900 INJECTION, SOLUTION INTRAVENOUS at 17:03

## 2019-04-08 RX ADMIN — VANCOMYCIN HYDROCHLORIDE 125 MG: KIT at 12:42

## 2019-04-08 RX ADMIN — Medication 10 ML: at 07:30

## 2019-04-08 RX ADMIN — HEPARIN SODIUM 5000 UNITS: 5000 INJECTION INTRAVENOUS; SUBCUTANEOUS at 00:09

## 2019-04-08 RX ADMIN — SODIUM CHLORIDE 36 ML: 900 INJECTION, SOLUTION INTRAVENOUS at 03:58

## 2019-04-08 RX ADMIN — HEPARIN SODIUM 5000 UNITS: 5000 INJECTION INTRAVENOUS; SUBCUTANEOUS at 17:01

## 2019-04-08 RX ADMIN — BUTALBITAL, ACETAMINOPHEN AND CAFFEINE 1 TABLET: 50; 325; 40 TABLET ORAL at 02:02

## 2019-04-08 RX ADMIN — POTASSIUM CHLORIDE 10 MEQ: 10 INJECTION, SOLUTION INTRAVENOUS at 04:18

## 2019-04-08 NOTE — PROGRESS NOTES
Problem: Risk for Spread of Infection  Goal: Prevent transmission of infectious organism to others  Description  Prevent the transmission of infectious organisms to other patients, staff members, and visitors. Outcome: Progressing Towards Goal     Problem: Falls - Risk of  Goal: *Absence of Falls  Description  Document Roberto Carlos Draper Fall Risk and appropriate interventions in the flowsheet.   Outcome: Progressing Towards Goal  Note:   Fall Risk Interventions:  Mobility Interventions: Communicate number of staff needed for ambulation/transfer, OT consult for ADLs, Patient to call before getting OOB, PT Consult for mobility concerns, PT Consult for assist device competence, Strengthening exercises (ROM-active/passive), Utilize walker, cane, or other assistive device         Medication Interventions: Patient to call before getting OOB, Teach patient to arise slowly, Evaluate medications/consider consulting pharmacy

## 2019-04-08 NOTE — PROGRESS NOTES
1955 Patient supplied bedtime seizure medication given to stay on patient's home schedule    0720 shift report given to [facility]

## 2019-04-08 NOTE — PROGRESS NOTES
Bedside shift change report given to Harish Reynaga RN by Xochitl Rios RN. Report included the following information SBAR, ED Summary, Intake/Output, MAR, Recent Results and Cardiac Rhythm NSR/sinus tach.

## 2019-04-08 NOTE — ED NOTES
Patient presents to ED, brought in by her parents iwht complaint of fever, abd pain, N/V, and complaint of worsening UTI. Patient mother stated that patient has been on multiple antibiotics. Patient is alert, responding appropriately. Patient has hx of hydrocephalus and seizures. Patient parents at bedside. Will continue to monitor and assess patient needs.

## 2019-04-08 NOTE — ED NOTES
TRANSFER - OUT REPORT:    Verbal report given to Mayi HERNANDEZ(name) on Ivonne  being transferred to [facility](unit) for routine progression of care       Report consisted of patients Situation, Background, Assessment and   Recommendations(SBAR). Information from the following report(s) SBAR was reviewed with the receiving nurse. Lines:   Peripheral IV 04/07/19 Left Antecubital (Active)   Site Assessment Clean, dry, & intact 4/7/2019  8:45 PM   Phlebitis Assessment 0 4/7/2019  8:45 PM   Infiltration Assessment 0 4/7/2019  8:45 PM   Dressing Status Clean, dry, & intact 4/7/2019  8:45 PM   Hub Color/Line Status Pink;Capped;Flushed 4/7/2019  8:45 PM        Opportunity for questions and clarification was provided.       Patient transported with:   Registered Nurse

## 2019-04-08 NOTE — PROGRESS NOTES
0600 Mother noted patient is \"puffier\" in the face than normal    0720 stool sample collected and sent for c.diff testing. Bedside and Verbal shift change report given to [address] [place] [address] [address] (oncoming nurse) by Kody Burden RN (offgoing nurse). Report included the following information SBAR, Kardex, Intake/Output and MAR.

## 2019-04-08 NOTE — ED PROVIDER NOTES
EMERGENCY DEPARTMENT HISTORY AND PHYSICAL EXAM           Date: 4/7/2019  Patient Name: Vivian Breen    History of Presenting Illness     Chief Complaint   Patient presents with    Fever    (LUTS) Lower Urinary Tract Symptoms     has been treated several times for UTI by DR Kay Ackerman since January and now with a double ear infection. Has been running fevers now as high as 102 with diarrhea, nausea, vomiting and mid abdominal pain.  Ear Pain    Headache    Abdominal Pain       History Provided By:  Patient and mother    HPI: Vivian Breen is a 51 y.o. female, pmhx hydrocephalus, cranial AVM, recurrent urinary tract infections who presents ambulatory to the ED with c/o fever, nausea/vomiting/diarrhea, abdominal pain. Mother reports the patient has been treated for recurrent urinary tract infections over the last 3 years by Dr. Kay Ackerman with [facility]. Since February has undergone 2 courses of Augmentin. Patient noted to have been changed over to Ceftin after last course of Augmentin. Last tablet was today. Patient also seen by primary care doctor 1 week ago for bilateral ear pain. Noted to have bilateral ear infections for which she was given eardrops. Mother reports the patient had escalating fever over the last several days noted to be 102 this morning. Patient was given Tylenol at 3:30 PM today. Patient also reports having generalized headache, chills and periumbilical abdominal pain. Patient is followed by Dr. Kayden Cervantes for her neurologic issues and seizure disorder. She does not have a  shunt for hydrocephalus. PCP: Tia Nino MD    There are no other complaints, changes, or physical findings at this time.      Allergies   Allergen Reactions    Adhesive Tape-Silicones Other (comments)         Current Facility-Administered Medications   Medication Dose Route Frequency Provider Last Rate Last Dose    sodium chloride (NS) flush 5-10 mL  5-10 mL IntraVENous PRN Raphael Henning MD        levoFLOXacin (LEVAQUIN) 750 mg in D5W IVPB  750 mg IntraVENous Q24H Raphael Henning  mL/hr at 04/07/19 2235 750 mg at 04/07/19 2235    butalbital-acetaminophen-caffeine (FIORICET, ESGIC) -40 mg per tablet 1 Tab  1 Tab Oral Q6H PRN Xavier Hamilton MD   1 Tab at 04/08/19 0202    phenytoin ER (DILANTIN ER) ER capsule 30 mg  30 mg Oral DAILY Junious Colon, Mikayla Ibanez MD        FLUoxetine (PROzac) capsule 20 mg  20 mg Oral DAILY Mikayla Henning MD        mirabegron ER (MYRBETRIQ) tablet 25 mg  25 mg Oral DAILY Junious Colon, Mikayla Ibanez MD        sodium chloride (NS) flush 5-40 mL  5-40 mL IntraVENous Q8H Levon RODRIGUEZ MD   10 mL at 04/07/19 2355    sodium chloride (NS) flush 5-40 mL  5-40 mL IntraVENous PRN Xavier Hamilton MD        acetaminophen (TYLENOL) tablet 650 mg  650 mg Oral Q6H PRN Xavier Hamilton MD   650 mg at 04/07/19 2350    ondansetron (ZOFRAN) injection 4 mg  4 mg IntraVENous Q6H PRN Xavier Hamilton MD        docusate sodium (COLACE) capsule 100 mg  100 mg Oral DAILY PRN Xavier Hamilton MD        heparin (porcine) injection 5,000 Units  5,000 Units SubCUTAneous Q8H Xavier Hamilton MD   5,000 Units at 04/08/19 0009    0.9% sodium chloride infusion  100 mL/hr IntraVENous CONTINUOUS Xavier Hamilton  mL/hr at 04/08/19 0417 100 mL/hr at 04/08/19 0417    phenytoin ER (DILANTIN ER) ER capsule 200 mg  200 mg Oral 7am Mikayla Henning MD        phenytoin ER (DILANTIN ER) ER capsule 300 mg  300 mg Oral QHS Junious Colon, Mikayla Ibanez MD        phenytoin ER (DILANTIN ER) ER capsule 30 mg  30 mg Oral DAILY Xavier Hamilton MD        . PHARMACY TO SUBSTITUTE PER PROTOCOL (Reordered from: TROKENDI  mg capsule)    Per Protocol Xavier Hamilton MD        vancomycin [facility]) 50 mg/mL oral solution 125 mg  125 mg Oral Q6H Xavier Hamilton MD   125 mg at 04/08/19 0581       Past History     Past Medical History:  Past Medical History:   Diagnosis Date    Bladder incontinence        Past Surgical History:  Past Surgical History:   Procedure Laterality Date    HX APPENDECTOMY      HX OTHER SURGICAL  2002    remove a lypoma       Family History:  Family History   Problem Relation Age of Onset    Heart Disease Father        Social History:  Social History     Tobacco Use    Smoking status: Never Smoker    Smokeless tobacco: Never Used   Substance Use Topics    Alcohol use: No    Drug use: Not on file       Allergies: Allergies   Allergen Reactions    Adhesive Tape-Silicones Other (comments)         Review of Systems   Review of Systems   Constitutional: Negative. Negative for fever. Eyes: Negative. Respiratory: Negative. Negative for shortness of breath. Cardiovascular: Negative for chest pain. Gastrointestinal: Positive for abdominal pain, diarrhea, nausea and vomiting. Endocrine: Negative. Genitourinary: Negative. Negative for difficulty urinating, dysuria and hematuria. Musculoskeletal: Negative. Skin: Negative. Neurological: Positive for headaches. Psychiatric/Behavioral: Negative for suicidal ideas. All other systems reviewed and are negative. Physical Exam   Physical Exam   Constitutional: She is oriented to person, place, and time. She appears well-developed and well-nourished. No distress. overweight   HENT:   Head: Normocephalic and atraumatic. Nose: Nose normal.   Eyes: Conjunctivae and EOM are normal. No scleral icterus. Neck: Normal range of motion. No tracheal deviation present. Cardiovascular: Normal rate, regular rhythm, normal heart sounds and intact distal pulses. Exam reveals no friction rub. No murmur heard. Pulmonary/Chest: Effort normal and breath sounds normal. No stridor. No respiratory distress. She has no wheezes. She has no rales. Abdominal: Soft. Bowel sounds are normal. She exhibits no distension. There is tenderness in the periumbilical area. There is no rigidity, no rebound and no guarding.        Musculoskeletal: Normal range of motion. She exhibits no tenderness. Neurological: She is alert and oriented to person, place, and time. No cranial nerve deficit. Skin: Skin is warm and dry. No rash noted. She is not diaphoretic. Psychiatric: She has a normal mood and affect. Her speech is normal and behavior is normal. Judgment and thought content normal. Cognition and memory are normal.   Nursing note and vitals reviewed. Diagnostic Study Results     Labs -     Recent Results (from the past 12 hour(s))   CBC WITH AUTOMATED DIFF    Collection Time: 04/07/19  8:51 PM   Result Value Ref Range    WBC 23.0 (H) 3.6 - 11.0 K/uL    RBC 4.85 3.80 - 5.20 M/uL    HGB 17.0 (H) 11.5 - 16.0 g/dL    HCT 46.8 35.0 - 47.0 %    MCV 96.5 80.0 - 99.0 FL    MCH 35.1 (H) 26.0 - 34.0 PG    MCHC 36.3 30.0 - 36.5 g/dL    RDW 12.4 11.5 - 14.5 %    PLATELET 438 080 - 811 K/uL    MPV 10.3 8.9 - 12.9 FL    NRBC 0.0 0  WBC    ABSOLUTE NRBC 0.00 0.00 - 0.01 K/uL    NEUTROPHILS 81 (H) 32 - 75 %    BAND NEUTROPHILS 7 %    LYMPHOCYTES 5 (L) 12 - 49 %    MONOCYTES 6 5 - 13 %    EOSINOPHILS 1 0 - 7 %    BASOPHILS 0 0 - 1 %    IMMATURE GRANULOCYTES 0 0.0 - 0.5 %    ABS. NEUTROPHILS 20.2 (H) 1.8 - 8.0 K/UL    ABS. LYMPHOCYTES 1.2 0.8 - 3.5 K/UL    ABS. MONOCYTES 1.4 (H) 0.0 - 1.0 K/UL    ABS. EOSINOPHILS 0.2 0.0 - 0.4 K/UL    ABS. BASOPHILS 0.0 0.0 - 0.1 K/UL    ABS. IMM.  GRANS. 0.0 0.00 - 0.04 K/UL    DF MANUAL      PLATELET COMMENTS VACUOLATED POLYS      RBC COMMENTS NORMOCYTIC, NORMOCHROMIC      WBC COMMENTS TOXIC GRANULATION     METABOLIC PANEL, COMPREHENSIVE    Collection Time: 04/07/19  8:51 PM   Result Value Ref Range    Sodium 136 136 - 145 mmol/L    Potassium 3.0 (L) 3.5 - 5.1 mmol/L    Chloride 105 97 - 108 mmol/L    CO2 23 21 - 32 mmol/L    Anion gap 8 5 - 15 mmol/L    Glucose 147 (H) 65 - 100 mg/dL    BUN 21 (H) 6 - 20 MG/DL    Creatinine 0.76 0.55 - 1.02 MG/DL    BUN/Creatinine ratio 28 (H) 12 - 20      GFR est AA >60 >60 ml/min/1.73m2    GFR est non-AA >60 >60 ml/min/1.73m2    Calcium 8.2 (L) 8.5 - 10.1 MG/DL    Bilirubin, total 0.7 0.2 - 1.0 MG/DL    ALT (SGPT) 39 12 - 78 U/L    AST (SGOT) 43 (H) 15 - 37 U/L    Alk.  phosphatase 82 45 - 117 U/L    Protein, total 7.1 6.4 - 8.2 g/dL    Albumin 3.3 (L) 3.5 - 5.0 g/dL    Globulin 3.8 2.0 - 4.0 g/dL    A-G Ratio 0.9 (L) 1.1 - 2.2     URINALYSIS W/ REFLEX CULTURE    Collection Time: 04/07/19  8:53 PM   Result Value Ref Range    Color BAUTISTA      Appearance CLOUDY (A) CLEAR      Specific gravity 1.030 1.003 - 1.030      pH (UA) 5.5 5.0 - 8.0      Protein 30 (A) NEG mg/dL    Glucose NEGATIVE  NEG mg/dL    Ketone TRACE (A) NEG mg/dL    Blood NEGATIVE  NEG      Urobilinogen 0.2 0.2 - 1.0 EU/dL    Nitrites POSITIVE (A) NEG      Leukocyte Esterase SMALL (A) NEG      WBC 0-4 0 - 4 /hpf    RBC 0-5 0 - 5 /hpf    Epithelial cells FEW FEW /lpf    Bacteria NEGATIVE  NEG /hpf    UA:UC IF INDICATED CULTURE NOT INDICATED BY UA RESULT CNI      Mucus 1+ (A) NEG /lpf   BILIRUBIN, CONFIRM    Collection Time: 04/07/19  8:53 PM   Result Value Ref Range    Bilirubin UA, confirm NEGATIVE  NEG     EKG, 12 LEAD, INITIAL    Collection Time: 04/07/19 10:14 PM   Result Value Ref Range    Ventricular Rate 97 BPM    Atrial Rate 97 BPM    P-R Interval 142 ms    QRS Duration 86 ms    Q-T Interval 438 ms    QTC Calculation (Bezet) 556 ms    Calculated P Axis 29 degrees    Calculated R Axis 104 degrees    Calculated T Axis 27 degrees    Diagnosis       Sinus rhythm with premature atrial complexes  Rightward axis  T wave abnormality, consider anterior ischemia  Prolonged QT  No previous ECGs available     LACTIC ACID    Collection Time: 04/07/19 11:10 PM   Result Value Ref Range    Lactic acid 2.1 (HH) 0.4 - 2.0 MMOL/L       Radiologic Studies -   CT ABD PELV W CONT   Final Result   IMPRESSION:   Acute colitis of the transverse colon      XR CHEST PORT   Final Result   IMPRESSION: Left upper lung pneumonia        CT Results  (Last 48 hours) 04/07/19 2251  CT ABD PELV W CONT Final result    Impression:  IMPRESSION:   Acute colitis of the transverse colon       Narrative:  EXAM: CT ABD PELV W CONT       INDICATION: Abdominal pain; Diarrhea       COMPARISON: None        CONTRAST: 100 mL of Isovue-370. TECHNIQUE:    Following the uneventful intravenous administration of contrast, thin axial   images were obtained through the abdomen and pelvis. Coronal and sagittal   reconstructions were generated. Oral contrast was not administered. CT dose   reduction was achieved through use of a standardized protocol tailored for this   examination and automatic exposure control for dose modulation. FINDINGS:    LUNG BASES: Clear. INCIDENTALLY IMAGED HEART AND MEDIASTINUM: Unremarkable. LIVER: No mass or biliary dilatation. GALLBLADDER: Unremarkable. SPLEEN: No mass. PANCREAS: No mass or ductal dilatation. ADRENALS: Unremarkable. KIDNEYS: No mass, calculus, or hydronephrosis. STOMACH: Unremarkable. SMALL BOWEL: No dilatation or wall thickening. COLON: Marked thickening and inflammatory change surrounding the transverse   colon. .   APPENDIX: Surgically absent   PERITONEUM: No ascites or pneumoperitoneum. RETROPERITONEUM: No lymphadenopathy or aortic aneurysm. REPRODUCTIVE ORGANS: Anteverted uterus   URINARY BLADDER: No mass or calculus. BONES: No destructive bone lesion. ADDITIONAL COMMENTS: N/A               CXR Results  (Last 48 hours)               04/07/19 2228  XR CHEST PORT Final result    Impression:  IMPRESSION: Left upper lung pneumonia       Narrative:  EXAM: XR CHEST PORT       INDICATION: Fever. meets SIRS criteria       COMPARISON: None. FINDINGS: A portable AP radiograph of the chest was obtained at 2215 hours. The   patient is on a cardiac monitor. There is a vague infiltrate in the left upper   lung.  The cardiac and mediastinal contours and pulmonary vascularity are normal.    The bones and soft tissues are grossly within normal limits. Medical Decision Making   I am the first provider for this patient. I reviewed the vital signs, available nursing notes, past medical history, past surgical history, family history and social history. Vital Signs-Reviewed the patient's vital signs. Patient Vitals for the past 12 hrs:   Temp Pulse Resp BP SpO2   04/08/19 0537 99.4 °F (37.4 °C) 92 18 157/78 93 %   04/07/19 2329 100.4 °F (38 °C) 89 20 136/85 92 %   04/07/19 2302 99.9 °F (37.7 °C) 97 27 135/63 98 %   04/07/19 2235 -- (!) 103 (!) 31 -- 97 %   04/07/19 2200 -- 99 25 -- 97 %   04/07/19 2134 -- 97 27 124/75 --   04/07/19 2120 -- (!) 101 20 132/73 --   04/07/19 2028 99.3 °F (37.4 °C) (!) 118 18 128/83 94 %       Pulse Oximetry Analysis - 94% on RA    Cardiac Monitor:   Rate: 118 bpm  Rhythm: Sinus tachycardia    Records Reviewed: Nursing Notes, Old Medical Records, Previous Radiology Studies and Previous Laboratory Studies    Provider Notes (Medical Decision Making):     DDX:  Sepsis, UTI, failed outpatient therapy, C. difficile, dehydration, left leg abnormality    Plan:  Labs, UA, cultures, lactate, CT scan of abdomen and pelvis to evaluate for colitis    Impression:  Recurrent UTI with Sirs criteria    ED Course:   Initial assessment performed. The patients presenting problems have been discussed, and they are in agreement with the care plan formulated and outlined with them. I have encouraged them to ask questions as they arise throughout their visit. I reviewed our electronic medical record system for any past medical records that were available that may contribute to the patients current condition, the nursing notes and and vital signs from today's visit    Nursing notes will be reviewed as they become available in realtime while the pt has been in the ED. Geraldo Vargas MD    EKG interpretation 2214: NSR, nl Axis, rate 97; , QRS 86, QTc 556; no acute ischemia;  Victoria Eric. Yolanda Payan MD    I personally reviewed pt's imaging. Official read by radiology noted above. Paulo Merlos MD    CONSULT NOTE:   10:20 PM  Paulo Merlos MD spoke with Dr. Josh Miranda,   Specialty: Paul Miranda due to concern for sepsis and UTI with fail. Discussed pt's HPI and available diagnostic results thus far. Expressed concerns for needed admission. Consultant will evaluate for admission. Paulo Merlos MD      ADMISSION NOTE:  11:08 PM  Patient is being admitted to the hospital by Dr. Josh Miranda. The results of their tests and reasons for their admission have been discussed with them and/or available family. They convey agreement and understanding for the need to be admitted and for their admission diagnosis. Paulo Merlos MD          Critical Care Time:   CRITICAL CARE NOTE:  IMPENDING DETERIORATION -Cardiovascular, CNS, Metabolic and Renal  ASSOCIATED RISK FACTORS - Hypotension, Shock, Dysrhythmia, Metabolic changes, Dehydration, Vascular Compromise and CNS Decompensation  MANAGEMENT- Bedside Assessment and Supervision of Care  INTERPRETATION -  Xrays, CT Scan, ECG and Blood Pressure  INTERVENTIONS - hemodynamic mngmt, vascular control, Neurologic interventions  and Metobolic interventions  CASE REVIEW - Hospitalist, Nursing and Family  TREATMENT RESPONSE -Improved  PERFORMED BY - Self  NOTES   :  I have spent 65 minutes of critical care time involved in lab review, consultations with specialist, family decision- making, bedside attention and documentation excluding time spent on any separately billed procedures. During this entire length of time I was immediately available to the patient . Paulo Merlos MD      Diagnosis     Clinical Impression:   1. Acute cystitis without hematuria    2.  Noninfectious gastroenteritis, unspecified type         Disposition:  Admit to hospitalist          Please note that this dictation was completed with [employer], the computer voice recognition software. Quite often unanticipated grammatical, syntax, homophones, and other interpretive errors are inadvertently transcribed by the computer software. Please disregard these errors. Please excuse any errors that have escaped final proofreading        This note will not be viewable in [employer].

## 2019-04-08 NOTE — PROGRESS NOTES
Pharmacy Medication Reconciliation       -Clarified PTA med list with Patients mother PTA medication list was corrected to the following:     Prior to Admission Medications   Prescriptions Last Dose Informant Patient Reported? Taking? FLUoxetine (PROZAC) 20 mg capsule 4/7/2019 at Unknown time  Yes Yes   Sig: Take 20 mg by mouth nightly. mirabegron ER (MYRBETRIQ) 25 mg ER tablet 4/7/2019 at Unknown time  Yes Yes   Sig: Take 25 mg by mouth nightly. phenytoin ER (DILANTIN ER) 100 mg ER capsule 4/7/2019 at Unknown time  Yes Yes   Sig: Take 200 mg by mouth daily. phenytoin ER (DILANTIN ER) 100 mg ER capsule 4/7/2019 at Unknown time  Yes Yes   Sig: Take 300 mg by mouth nightly. Take (3) 100 mg capsules along with a 30 mg capsule for a total dose of 330 mg at bedtime   phenytoin ER (DILANTIN ER) 30 mg ER capsule 4/7/2019 at Unknown time  Yes Yes   Sig: Take 30 mg by mouth nightly. Takes a 30 mg capsule along with (3) 100 mg capsules for a total dose of 330 mg at bedtime   topiramate ER (TROKENDI XR) 200 mg capsule 4/7/2019 at Unknown time  Yes Yes   Sig: Take 400 mg by mouth nightly.       Facility-Administered Medications: None          Thank you,  Lettie Essex, MURIELD

## 2019-04-08 NOTE — PROGRESS NOTES
TRANSFER - IN REPORT:    Verbal report received from [address] RN(name) on [address]  being received from ED(unit) for routine progression of care      Report consisted of patients Situation, Background, Assessment and   Recommendations(SBAR). Information from the following report(s) SBAR, Kardex, ED Summary, Intake/Output, MAR and Cardiac Rhythm Sinus Tach was reviewed with the receiving nurse. Opportunity for questions and clarification was provided. Assessment completed upon patients arrival to unit and care assumed.

## 2019-04-08 NOTE — PROGRESS NOTES
Hospitalist Progress Note    NAME: Shani Enriquez   :  1970   MRN:  [id number]       Assessment / Plan:  Severe sepsis in the setting of acute colitis and UTI  Acute colitis, concerning for Cdiff infection given multiple abx exposure  -CT a/p showed colitis in the transverse colon  -stool studies, cdiff pending  -FOBT positive,  hgb stable  -empiric PO vancomycin for Cdiff, if neg, will restart levaquin and flagyl  -on CLD, advance as tolerated  -oxycodone prn  -GI consultation    Recurrent UTI   -UA suggestive of UTI, do not see Ucx pending. Will reorder ucx. She received levaquin x1 dose  -pt recently completed 2 rounds of agumentin and 1 round of ceftin prior to admission per pt's mother  -she takes prophylactic macrobid daily for recurrent UTI, prescribed by her urologist  -I spoke to Dr Yusuf Doan in regards to abx choice, she will see patient and make further recs    History of seizure disorder  -seizure precaution  -resume PTA seizure meds    Hypokalemia  -repleted, monitor    History of chronic hydrocephalus  -supportive care. Pt is independent of ADLs    Obesity- Body mass index is 37.11 kg/m². Code status: Full  Prophylaxis: Hep SQ  Recommended Disposition:  PT, OT, RN     Subjective:     Chief Complaint / Reason for Physician Visit  Pt seen in bed, mother at bedside. Pt states she feels miserable. Had 1 watery BM ~7AM, none since. Discussed with RN events overnight. Review of Systems:  Symptom Y/N Comments  Symptom Y/N Comments   Fever/Chills n   Chest Pain     Poor Appetite    Edema     Cough    Abdominal Pain y mild   Sputum    Joint Pain     SOB/GOLDEN n   Pruritis/Rash     Nausea/vomit    Tolerating PT/OT     Diarrhea y   Tolerating Diet     Constipation    Other       Could NOT obtain due to:      Objective:     VITALS:   Last 24hrs VS reviewed since prior progress note.  Most recent are:  Patient Vitals for the past 24 hrs:   Temp Pulse Resp BP SpO2   19 1209 99.3 °F (37.4 °C) 98 17 130/72 96 %   04/08/19 0827 99.7 °F (37.6 °C) 91 18 156/88 95 %   04/08/19 0537 99.4 °F (37.4 °C) 92 18 157/78 93 %   04/07/19 2329 100.4 °F (38 °C) 89 20 136/85 92 %   04/07/19 2302 99.9 °F (37.7 °C) 97 27 135/63 98 %   04/07/19 2235 -- (!) 103 (!) 31 -- 97 %   04/07/19 2200 -- 99 25 -- 97 %   04/07/19 2134 -- 97 27 124/75 --   04/07/19 2120 -- (!) 101 20 132/73 --   04/07/19 2028 99.3 °F (37.4 °C) (!) 118 18 128/83 94 %       Intake/Output Summary (Last 24 hours) at 4/8/2019 1345  Last data filed at 4/8/2019 0358  Gross per 24 hour   Intake 4236 ml   Output --   Net 4236 ml        PHYSICAL EXAM:  General: WD, WN. Alert, NAD  EENT:  EOMI. Anicteric sclerae. MMM  Resp:  CTA bilaterally, no wheezing or rales. No accessory muscle use  CV:  Regular  rhythm,  No edema  GI:  Soft, Non distended, Non tender.  +Bowel sounds  Neurologic:  Alert and oriented X 3, normal speech  Psych:   Fair insight. Not anxious nor agitated  Skin:  No rashes. No jaundice    Reviewed most current lab test results and cultures  YES  Reviewed most current radiology test results   YES  Review and summation of old records today    NO  Reviewed patient's current orders and MAR    YES  PMH/SH reviewed - no change compared to H&P  ________________________________________________________________________  Care Plan discussed with:    Comments   Patient x    Family  x Pt's mom   RN x    Care Manager     Consultant  x ID                     Multidiciplinary team rounds were held today with , nursing, pharmacist and clinical coordinator. Patient's plan of care was discussed; medications were reviewed and discharge planning was addressed.      ________________________________________________________________________  Total NON critical care TIME:  40   Minutes    Total CRITICAL CARE TIME Spent:   Minutes non procedure based      Comments   >50% of visit spent in counseling and coordination of care ________________________________________________________________________  Lakeshia Butt MD     Procedures: see electronic medical records for all procedures/Xrays and details which were not copied into this note but were reviewed prior to creation of Plan. LABS:  I reviewed today's most current labs and imaging studies.   Pertinent labs include:  Recent Labs     04/08/19 0545 04/07/19 2051   WBC 16.7* 23.0*   HGB 13.9 17.0*   HCT 40.0 46.8   * 160     Recent Labs     04/08/19 0545 04/07/19 2051    136   K 3.6 3.0*   * 105   CO2 20* 23   * 147*   BUN 17 21*   CREA 0.55 0.76   CA 7.2* 8.2*   ALB  --  3.3*   TBILI  --  0.7   SGOT  --  43*   ALT  --  39       Signed: Lakeshia Butt MD

## 2019-04-08 NOTE — H&P
Hospitalist Admission Note    NAME: Janna Ashley   :  1970   MRN:  [id number]     Date/Time:  2019 10:55 PM    Patient PCP: Marti Bloom MD  ________________________________________________________________________    My assessment of this patient's clinical condition and my plan of care is as follows. Assessment / Plan:  Severe sepsis likely related to urinary tract infection  UA is positive for nitrites and leukocyte esterase and no bacteria  CT abdomen is pending at this time  She has already completed 2 rounds of Augmentin and Ceftin  We will start empiric levofloxacin  Lactic acid is 2.1  Blood cultures were drawn  Continue empiric levofloxacin and adjust antibiotics as needed  Based on CT appearance, consider urology consult when she follows up with Dr. Yudi Garcia  I told Dr Kyle Reynaga to please follow up on results of CT scan and let hospitalist know    Diarrhea high risk for C. difficile colitis given recent multiple antibiotic exposures  Stool for C. difficile has been sent  CT of abdomen has been ordered  Start empiric p.o. Flagyl as she is at high risk  Continue IV fluids    History of seizure disorder  Resume her home antiseizure medications including phenytoin, Trokendi  Seizure precautions  She took her meds for today  Mom does not want me to change them to iv since nausea is better  If vomiting again  consider changing to iv      Hypokalemia  Replaced with KCl and recheck in a.m. Obesity    History of chronic hydrocephalus  Supportive care  She is independent of her ADLs        Code Status: full  Surrogate Decision Maker:  Mother    DVT Prophylaxis: heparin   GI Prophylaxis: not indicated    Baseline: From home indpendent        Subjective:   CHIEF COMPLAINT: n/v    HISTORY OF PRESENT ILLNESS:     This is a 69-year-old female with past medical history of congenital hydrocephalus, seizure disorder, chronic urinary tract infection is coming to the hospital chief complaints of fever, abdominal pain, nausea and vomiting.  Patient at baseline has seizures secondary to hydrocephalus and has been on seizure medication since a very long time and she also has been having recurrent urinary tract infections since the last 5 years. El Shelton was recently diagnosed with yet another urinary tract infection for which she completed 2 rounds of antibiotics including Augmentin and Ceftin.  She was also recently diagnosed to have a urine infection as well for which she was prescribed antibiotics.  About 2 days ago she started having fever along with chills and also nausea with clear vomitus.  She also started having abdominal pain which is generalized, mild, lower abdomen, without any aggravating or relieving factors. On arrival to the hospital she was tachycardic with heart rate of 118.  On lab work he was noted to have a white count of 23,000.  Potassium was 3.0.  CT abdomen was ordered and is currently pending at this time. El Shelton was given IV fluids per sepsis protocol as lactic acid was 2.1.  She was also given levofloxacin. We were asked to admit for work up and evaluation of the above problems. Past Medical History:   Diagnosis Date    Bladder incontinence         Past Surgical History:   Procedure Laterality Date    HX APPENDECTOMY      HX OTHER SURGICAL  2002    remove a lypoma       Social History     Tobacco Use    Smoking status: Never Smoker    Smokeless tobacco: Never Used   Substance Use Topics    Alcohol use: No        Family History   Problem Relation Age of Onset    Heart Disease Father      Allergies   Allergen Reactions    Adhesive Tape-Silicones Other (comments)        Prior to Admission medications    Medication Sig Start Date End Date Taking? Authorizing Provider   phenytoin ER (DILANTIN ER) 30 mg ER capsule Take 1 Cap by mouth daily.  BRAND MEDICALLY NECESSARY  Indications: epilepsy 10/5/18   Oletha Councilman, MD   phenytoin ER (DILANTIN ER) 100 mg ER capsule 2 every morning and 3 nightly BRAND MEDICALLY NECESSARY  Indications: epilepsy 7/26/18   Katelin Sotomayor MD   FLUoxetine (PROZAC) 20 mg capsule TAKE 1 CAPSULE BY MOUTH DAILY FOR MOOD 6/25/18   Katelin Sotomayor MD   TROKENDI  mg capsule Take 2 a day BRAND MEDICALLY NECESSARY  Indications: TONIC-CLONIC EPILEPSY 5/10/18   Katelin Sotomayor MD   DILANTIN 30 mg ER capsule TAKE 1 CAPSULE BY MOUTH EVERY DAY FOR 90 DAYS 3/19/18   Cj Sweeney MD   butalbital-acetaminophen-caffeine (FIORICET, ESGIC) -40 mg per tablet TAKE 1 TABLET BY MOUTH EVERY 4 HOURS AS NEEDED FOR HEADACHE OR PAIN 4/30/17   Cj Sweeney MD   LORazepam (ATIVAN) 1 mg tablet Take 1 tab 1 hour prior to procedure   Then take 1 tab at time of procedure 10/4/16   Cj Sweeney MD   mirabegron ER (MYRBETRIQ) 25 mg ER tablet Take 25 mg by mouth daily. Provider, Historical   NITROFURANTOIN MONOHYD/M-CRYST (MACROBID PO) Take  by mouth. Provider, Historical   OTHER     Provider, Historical       REVIEW OF SYSTEMS:     I am not able to complete the review of systems because:    The patient is intubated and sedated    The patient has altered mental status due to his acute medical problems    The patient has baseline aphasia from prior stroke(s)    The patient has baseline dementia and is not reliable historian    The patient is in acute medical distress and unable to provide information           Total of 12 systems reviewed as follows:       POSITIVE= underlined text  Negative = text not underlined  General:  fever, chills, sweats, generalized weakness, weight loss/gain,      loss of appetite   Eyes:    blurred vision, eye pain, loss of vision, double vision  ENT:    rhinorrhea, pharyngitis   Respiratory:   cough, sputum production, SOB, GOLDEN, wheezing, pleuritic pain   Cardiology:   chest pain, palpitations, orthopnea, PND, edema, syncope   Gastrointestinal:  abdominal pain , N/V, diarrhea, dysphagia, constipation, bleeding   Genitourinary:  frequency, urgency, dysuria, hematuria, incontinence   Muskuloskeletal :  arthralgia, myalgia, back pain  Hematology:  easy bruising, nose or gum bleeding, lymphadenopathy   Dermatological: rash, ulceration, pruritis, color change / jaundice  Endocrine:   hot flashes or polydipsia   Neurological:  headache, dizziness, confusion, focal weakness, paresthesia,     Speech difficulties, memory loss, gait difficulty  Psychological: Feelings of anxiety, depression, agitation    Objective:   VITALS:    Visit Vitals  /73   Pulse (!) 101   Temp 99.3 °F (37.4 °C)   Resp 20   Ht 5' 5\" (1.651 m)   Wt 101.2 kg (223 lb)   SpO2 94%   BMI 37.11 kg/m²       PHYSICAL EXAM:    General:    Alert, cooperative, no distress, appears stated age. HEENT: Atraumatic, anicteric sclerae, pink conjunctivae     No oral ulcers, mucosa moist, throat clear, dentition fair  Neck:  Supple, symmetrical,  thyroid: non tender  Lungs:   Clear to auscultation bilaterally. No Wheezing or Rhonchi. No rales. Chest wall:  No tenderness  No Accessory muscle use. Heart:   Regular  rhythm,  No  murmur   No edema  Abdomen:   Soft, mild tenderness lower abdomen . Not distended. Bowel sounds normal  Extremities: No cyanosis. No clubbing,      Skin turgor normal, Capillary refill normal, Radial dial pulse 2+  Skin:     Not pale. Not Jaundiced  No rashes   Psych:  Not anxious or agitated. Neurologic: EOMs intact. No facial asymmetry. No aphasia or slurred speech. Symmetrical strength, Sensation grossly intact.  Alert and oriented X 4.     _______________________________________________________________________  Care Plan discussed with:    Comments   Patient y    Family      RN y    Care Manager                    Consultant:      _______________________________________________________________________  Expected  Disposition:   Home with Family y   HH/PT/OT/RN    SNF/LTC    MAULIK ________________________________________________________________________  TOTAL TIME:  60  Minutes    Critical Care Provided     Minutes non procedure based      Comments    y Reviewed previous records   >50% of visit spent in counseling and coordination of care y Discussion with patient and/or family and questions answered       ________________________________________________________________________  Signed: Mirlande Watkins MD    Procedures: see electronic medical records for all procedures/Xrays and details which were not copied into this note but were reviewed prior to creation of Plan. LAB DATA REVIEWED:    Recent Results (from the past 24 hour(s))   CBC WITH AUTOMATED DIFF    Collection Time: 04/07/19  8:51 PM   Result Value Ref Range    WBC 23.0 (H) 3.6 - 11.0 K/uL    RBC 4.85 3.80 - 5.20 M/uL    HGB 17.0 (H) 11.5 - 16.0 g/dL    HCT 46.8 35.0 - 47.0 %    MCV 96.5 80.0 - 99.0 FL    MCH 35.1 (H) 26.0 - 34.0 PG    MCHC 36.3 30.0 - 36.5 g/dL    RDW 12.4 11.5 - 14.5 %    PLATELET 498 511 - 637 K/uL    MPV 10.3 8.9 - 12.9 FL    NRBC 0.0 0  WBC    ABSOLUTE NRBC 0.00 0.00 - 0.01 K/uL    NEUTROPHILS 81 (H) 32 - 75 %    BAND NEUTROPHILS 7 %    LYMPHOCYTES 5 (L) 12 - 49 %    MONOCYTES 6 5 - 13 %    EOSINOPHILS 1 0 - 7 %    BASOPHILS 0 0 - 1 %    IMMATURE GRANULOCYTES 0 0.0 - 0.5 %    ABS. NEUTROPHILS 20.2 (H) 1.8 - 8.0 K/UL    ABS. LYMPHOCYTES 1.2 0.8 - 3.5 K/UL    ABS. MONOCYTES 1.4 (H) 0.0 - 1.0 K/UL    ABS. EOSINOPHILS 0.2 0.0 - 0.4 K/UL    ABS. BASOPHILS 0.0 0.0 - 0.1 K/UL    ABS. IMM.  GRANS. 0.0 0.00 - 0.04 K/UL    DF MANUAL      PLATELET COMMENTS VACUOLATED POLYS      RBC COMMENTS NORMOCYTIC, NORMOCHROMIC      WBC COMMENTS TOXIC GRANULATION     METABOLIC PANEL, COMPREHENSIVE    Collection Time: 04/07/19  8:51 PM   Result Value Ref Range    Sodium 136 136 - 145 mmol/L    Potassium 3.0 (L) 3.5 - 5.1 mmol/L    Chloride 105 97 - 108 mmol/L    CO2 23 21 - 32 mmol/L    Anion gap 8 5 - 15 mmol/L    Glucose 147 (H) 65 - 100 mg/dL    BUN 21 (H) 6 - 20 MG/DL    Creatinine 0.76 0.55 - 1.02 MG/DL    BUN/Creatinine ratio 28 (H) 12 - 20      GFR est AA >60 >60 ml/min/1.73m2    GFR est non-AA >60 >60 ml/min/1.73m2    Calcium 8.2 (L) 8.5 - 10.1 MG/DL    Bilirubin, total 0.7 0.2 - 1.0 MG/DL    ALT (SGPT) 39 12 - 78 U/L    AST (SGOT) 43 (H) 15 - 37 U/L    Alk.  phosphatase 82 45 - 117 U/L    Protein, total 7.1 6.4 - 8.2 g/dL    Albumin 3.3 (L) 3.5 - 5.0 g/dL    Globulin 3.8 2.0 - 4.0 g/dL    A-G Ratio 0.9 (L) 1.1 - 2.2     URINALYSIS W/ REFLEX CULTURE    Collection Time: 04/07/19  8:53 PM   Result Value Ref Range    Color BAUTISTA      Appearance CLOUDY (A) CLEAR      Specific gravity 1.030 1.003 - 1.030      pH (UA) 5.5 5.0 - 8.0      Protein 30 (A) NEG mg/dL    Glucose NEGATIVE  NEG mg/dL    Ketone TRACE (A) NEG mg/dL    Blood NEGATIVE  NEG      Urobilinogen 0.2 0.2 - 1.0 EU/dL    Nitrites POSITIVE (A) NEG      Leukocyte Esterase SMALL (A) NEG      WBC 0-4 0 - 4 /hpf    RBC 0-5 0 - 5 /hpf    Epithelial cells FEW FEW /lpf    Bacteria NEGATIVE  NEG /hpf    UA:UC IF INDICATED CULTURE NOT INDICATED BY UA RESULT CNI      Mucus 1+ (A) NEG /lpf   BILIRUBIN, CONFIRM    Collection Time: 04/07/19  8:53 PM   Result Value Ref Range    Bilirubin UA, confirm NEGATIVE  NEG     EKG, 12 LEAD, INITIAL    Collection Time: 04/07/19 10:14 PM   Result Value Ref Range    Ventricular Rate 97 BPM    Atrial Rate 97 BPM    P-R Interval 142 ms    QRS Duration 86 ms    Q-T Interval 438 ms    QTC Calculation (Bezet) 556 ms    Calculated P Axis 29 degrees    Calculated R Axis 104 degrees    Calculated T Axis 27 degrees    Diagnosis       Sinus rhythm with premature atrial complexes  Rightward axis  T wave abnormality, consider anterior ischemia  Prolonged QT  No previous ECGs available

## 2019-04-08 NOTE — PHYSICIAN ADVISORY
Letter of Status Determination: Current Status   INPATIENT is Appropriate         Pt Name:  Nabil Pate   MR#  [id number]   [facility]#   [id number]   [address] [place], [place] [address]/73  @ [facility]   Hospitalization date  4/7/2019  8:32 PM   Current Attending Physician  Annemarie Barnard MD   Principal diagnosis  Acute colitis    Clinicals  This is a 76-year-old female with past medical history of congenital hydrocephalus, seizure disorder, chronic urinary tract infection is coming to the hospital chief complaints of fever, abdominal pain, nausea and vomiting.  Patient at baseline has seizures secondary to hydrocephalus and has been on seizure medication since a very long time and she also has been having recurrent urinary tract infections since the last 5 years. Evelio Horner was recently diagnosed with yet another urinary tract infection for which she completed 2 rounds of antibiotics including Augmentin and Ceftin.  She was also recently diagnosed to have a urine infection as well for which she was prescribed antibiotics.  About 2 days ago she started having fever along with chills and also nausea with clear vomitus.  She also started having abdominal pain which is generalized, mild, lower abdomen, without any aggravating or relieving factors.     On arrival to the hospital she was tachycardic with heart rate of 118.  On lab work he was noted to have a white count of 23,000.  Potassium was 3.0.  CT abdomen was ordered and is currently pending at this time. Evelio Horner was given IV fluids per sepsis protocol as lactic acid was 2.1.  She was also given levofloxacin.   CT Abd with acute colitis, Pt deemd to be septic on arriva to ED, failed outpatient treatment, Pt with N/V and on IVF, High risk for c diff          [place] MCG criteria       STATUS DETERMINATION  On the basis of clinical data, available documentaion, we believe that the current status of this patient as INPATIENT is Appropriate     The final decision of the patient's hospitalization status depends on the attending physician's judgment    Additional comments     Insurance  Payor: Tawana Verdugo / Plan: [address] / Product Type: Medicare /    Insurance Information                [employer] [place] [employer] Phone:     Subscriber: Ellie Ohara Subscriber#: [id number]    Group#:  Precert#:         [address] Phone:     SubscriberJaci Nyhan Subscriber#: [id number]    Group#:  Precert#:                    Jaime Molina MD  Cell: [phone]  Physician Advisor

## 2019-04-08 NOTE — CONSULTS
[address]               Gastroenterology Consult Note  RICHA Guevara for Dr. Tova Trinh     Admitting: Dr. Sheree Stanford Date: 4/8/2019     Subjective:     Chief Complaint:     History of Present Illness: Talon Dixon is a 51 y.o. female who is seen in consultation for colitis on CT scan. Past hx of seizure disorder. Pt mother provides most of hx, per mom she had low grade fevers starting last Monday, she had recently had a UTI as well as a double ear infection. She started then having vomiting Wednesday, they Saturday she continued to vomit and diarrhea started. Was on Ceftin for UTI as well as Ear drops. Temp increased to tmax of 102 yesterday evening. Mother reports hematochezia starting on Sunday morning. Occurred about 4x that day. Blood was a maroon marco a type color. No SOB, syncope, was a little dizzy when sx started. Sees Dr. Sandeep Sargent for recurrent UTIs. Was on Macrobid for 2 years daily for this. Was given flagyl 2 days prior to the ceftin. No hematemesis. Pain in her abdomen is located in the middle of her belly, described as a cramping pain, has resolved some and now a 5/10. When pain initially started it was 10/10 on Saturday. She has also had a continued headache. Took tylenol for the fevers, she also takes maloxicam daily for the last year. No other NSAID use. Pt seen in ED and labs show elevated WBC as well as elevated lactic acid. CT was done with contrast showing colitis in the transverse colon. Stool found to be heme positive. Was started on oral vanc, C Diff pending. Stool culture is preliminarily negative.      Past Medical History:   Diagnosis Date    Bladder incontinence      Past Surgical History:   Procedure Laterality Date    HX APPENDECTOMY      HX OTHER SURGICAL  2002    remove a lypoma      Family History   Problem Relation Age of Onset    Heart Disease Father      Social History     Tobacco Use    Smoking status: Never Smoker    Smokeless tobacco: Never Used   Substance Use Topics    Alcohol use: No      Allergies   Allergen Reactions    Adhesive Tape-Silicones Other (comments)     Current Facility-Administered Medications   Medication Dose Route Frequency    topiramate ER (TROKENDI XR) capsule 400 mg (Patient Supplied)  400 mg Oral QHS    phenytoin ER (DILANTIN ER) ER capsule 200 mg (Patient Supplied)  200 mg Oral DAILY    phenytoin ER (DILANTIN ER) ER capsule 330 mg (Patient Supplied)  330 mg Oral QHS    oxyCODONE IR (ROXICODONE) tablet 5 mg  5 mg Oral Q6H PRN    sodium chloride (NS) flush 5-10 mL  5-10 mL IntraVENous PRN    butalbital-acetaminophen-caffeine (FIORICET, ESGIC) -40 mg per tablet 1 Tab  1 Tab Oral Q6H PRN    FLUoxetine (PROzac) capsule 20 mg  20 mg Oral DAILY    mirabegron ER (MYRBETRIQ) tablet 25 mg  25 mg Oral DAILY    sodium chloride (NS) flush 5-40 mL  5-40 mL IntraVENous Q8H    sodium chloride (NS) flush 5-40 mL  5-40 mL IntraVENous PRN    acetaminophen (TYLENOL) tablet 650 mg  650 mg Oral Q6H PRN    ondansetron (ZOFRAN) injection 4 mg  4 mg IntraVENous Q6H PRN    docusate sodium (COLACE) capsule 100 mg  100 mg Oral DAILY PRN    heparin (porcine) injection 5,000 Units  5,000 Units SubCUTAneous Q8H    0.9% sodium chloride infusion  100 mL/hr IntraVENous CONTINUOUS    vancomycin (FIRVANQ) 50 mg/mL oral solution 125 mg  125 mg Oral Q6H        Review of Systems:    A detailed review of systems was performed as follows:  Constitutional:  Negative  Eyes:  No ocular sensitivity to the sun, blurred vision or double vision. ENMT:  +ear infection  Respiratory: +coughing, no wheezing or sob  Cardiac:  No chest pain, exertional chest pain or palpitations  Gastrointestinal:  See history of the present illness  :   No pain with urination or hematuria, +recent UTI  Musculoskeletal:  No arthritis or hot swollen joints.     Endocrine:  No thyroid disease or diabetes  Psychiatric: No depression or feeling blue  Integumentary:  No skin rash or sensitivity to the sun. Neurologic:  +seizure; no numbness or tingling of the extremities. Heme-Lymphatic:  No history of anemia, no unexplained lumps or bumps      Objective:     Physical Exam:  Visit Vitals  /90 (BP Patient Position: At rest)   Pulse 86   Temp 98.7 °F (37.1 °C)   Resp 16   Ht 5' 5\" (1.651 m)   Wt 101.2 kg (223 lb)   SpO2 96%   BMI 37.11 kg/m²        GEN: WFM, NAD, resting comfortably  Skin:  Extremities and face reveal no rashes. HEENT: Sclerae anicteric. Extra-occular muscles are intact. Cardiovascular: Regular rate and rhythm. No murmurs, gallops, or rubs. Respiratory:  Comfortable breathing with no accessory muscle use. Clear breath sounds with no wheezes, rales, or rhonchi. GI:  Abdomen nondistended, soft, umbilical abdominal tenderness. No guarding or rebound. Normal active bowel sounds. No enlargement of the liver or spleen. No masses palpable. Rectal:  Deferred  Musculoskeletal:  No pitting edema of the lower legs. Neurological:  Gross memory appears intact. Patient is alert and oriented. Psychiatric:  Mood appears appropriate with judgement intact. Lymphatic:  No cervical or supraclavicular adenopathy. Laboratory:    Recent Results (from the past 24 hour(s))   CBC WITH AUTOMATED DIFF    Collection Time: 04/07/19  8:51 PM   Result Value Ref Range    WBC 23.0 (H) 3.6 - 11.0 K/uL    RBC 4.85 3.80 - 5.20 M/uL    HGB 17.0 (H) 11.5 - 16.0 g/dL    HCT 46.8 35.0 - 47.0 %    MCV 96.5 80.0 - 99.0 FL    MCH 35.1 (H) 26.0 - 34.0 PG    MCHC 36.3 30.0 - 36.5 g/dL    RDW 12.4 11.5 - 14.5 %    PLATELET 552 554 - 737 K/uL    MPV 10.3 8.9 - 12.9 FL    NRBC 0.0 0  WBC    ABSOLUTE NRBC 0.00 0.00 - 0.01 K/uL    NEUTROPHILS 81 (H) 32 - 75 %    BAND NEUTROPHILS 7 %    LYMPHOCYTES 5 (L) 12 - 49 %    MONOCYTES 6 5 - 13 %    EOSINOPHILS 1 0 - 7 %    BASOPHILS 0 0 - 1 %    IMMATURE GRANULOCYTES 0 0.0 - 0.5 %    ABS.  NEUTROPHILS 20.2 (H) 1.8 - 8.0 K/UL    ABS. LYMPHOCYTES 1.2 0.8 - 3.5 K/UL    ABS. MONOCYTES 1.4 (H) 0.0 - 1.0 K/UL    ABS. EOSINOPHILS 0.2 0.0 - 0.4 K/UL    ABS. BASOPHILS 0.0 0.0 - 0.1 K/UL    ABS. IMM. GRANS. 0.0 0.00 - 0.04 K/UL    DF MANUAL      PLATELET COMMENTS VACUOLATED POLYS      RBC COMMENTS NORMOCYTIC, NORMOCHROMIC      WBC COMMENTS TOXIC GRANULATION     METABOLIC PANEL, COMPREHENSIVE    Collection Time: 04/07/19  8:51 PM   Result Value Ref Range    Sodium 136 136 - 145 mmol/L    Potassium 3.0 (L) 3.5 - 5.1 mmol/L    Chloride 105 97 - 108 mmol/L    CO2 23 21 - 32 mmol/L    Anion gap 8 5 - 15 mmol/L    Glucose 147 (H) 65 - 100 mg/dL    BUN 21 (H) 6 - 20 MG/DL    Creatinine 0.76 0.55 - 1.02 MG/DL    BUN/Creatinine ratio 28 (H) 12 - 20      GFR est AA >60 >60 ml/min/1.73m2    GFR est non-AA >60 >60 ml/min/1.73m2    Calcium 8.2 (L) 8.5 - 10.1 MG/DL    Bilirubin, total 0.7 0.2 - 1.0 MG/DL    ALT (SGPT) 39 12 - 78 U/L    AST (SGOT) 43 (H) 15 - 37 U/L    Alk.  phosphatase 82 45 - 117 U/L    Protein, total 7.1 6.4 - 8.2 g/dL    Albumin 3.3 (L) 3.5 - 5.0 g/dL    Globulin 3.8 2.0 - 4.0 g/dL    A-G Ratio 0.9 (L) 1.1 - 2.2     CULTURE, BLOOD, PERIPHERAL    Collection Time: 04/07/19  8:51 PM   Result Value Ref Range    Special Requests: NO SPECIAL REQUESTS      Culture result: NO GROWTH AFTER 10 HOURS     CULTURE, BLOOD, PERIPHERAL    Collection Time: 04/07/19  8:51 PM   Result Value Ref Range    Special Requests: NO SPECIAL REQUESTS      Culture result: NO GROWTH AFTER 10 HOURS     URINALYSIS W/ REFLEX CULTURE    Collection Time: 04/07/19  8:53 PM   Result Value Ref Range    Color BAUTISTA      Appearance CLOUDY (A) CLEAR      Specific gravity 1.030 1.003 - 1.030      pH (UA) 5.5 5.0 - 8.0      Protein 30 (A) NEG mg/dL    Glucose NEGATIVE  NEG mg/dL    Ketone TRACE (A) NEG mg/dL    Blood NEGATIVE  NEG      Urobilinogen 0.2 0.2 - 1.0 EU/dL    Nitrites POSITIVE (A) NEG      Leukocyte Esterase SMALL (A) NEG      WBC 0-4 0 - 4 /hpf    RBC 0-5 0 - 5 /hpf Epithelial cells FEW FEW /lpf    Bacteria NEGATIVE  NEG /hpf    UA:UC IF INDICATED CULTURE NOT INDICATED BY UA RESULT CNI      Mucus 1+ (A) NEG /lpf   BILIRUBIN, CONFIRM    Collection Time: 04/07/19  8:53 PM   Result Value Ref Range    Bilirubin UA, confirm NEGATIVE  NEG     POC LACTIC ACID    Collection Time: 04/07/19  9:09 PM   Result Value Ref Range    Lactic Acid (POC) 2.19 (HH) 0.40 - 2.00 mmol/L   EKG, 12 LEAD, INITIAL    Collection Time: 04/07/19 10:14 PM   Result Value Ref Range    Ventricular Rate 97 BPM    Atrial Rate 97 BPM    P-R Interval 142 ms    QRS Duration 86 ms    Q-T Interval 438 ms    QTC Calculation (Bezet) 556 ms    Calculated P Axis 29 degrees    Calculated R Axis 104 degrees    Calculated T Axis 27 degrees    Diagnosis       Sinus rhythm with premature atrial complexes  Rightward axis  T wave abnormality, consider anterior ischemia  Prolonged QT  No previous ECGs available  Confirmed by Angeles Young ([id number]) on 4/8/2019 12:37:27 PM     LACTIC ACID    Collection Time: 04/07/19 11:10 PM   Result Value Ref Range    Lactic acid 2.1 (HH) 0.4 - 2.0 MMOL/L   METABOLIC PANEL, BASIC    Collection Time: 04/08/19  5:45 AM   Result Value Ref Range    Sodium 140 136 - 145 mmol/L    Potassium 3.6 3.5 - 5.1 mmol/L    Chloride 112 (H) 97 - 108 mmol/L    CO2 20 (L) 21 - 32 mmol/L    Anion gap 8 5 - 15 mmol/L    Glucose 118 (H) 65 - 100 mg/dL    BUN 17 6 - 20 MG/DL    Creatinine 0.55 0.55 - 1.02 MG/DL    BUN/Creatinine ratio 31 (H) 12 - 20      GFR est AA >60 >60 ml/min/1.73m2    GFR est non-AA >60 >60 ml/min/1.73m2    Calcium 7.2 (L) 8.5 - 10.1 MG/DL   CBC WITH AUTOMATED DIFF    Collection Time: 04/08/19  5:45 AM   Result Value Ref Range    WBC 16.7 (H) 3.6 - 11.0 K/uL    RBC 3.98 3.80 - 5.20 M/uL    HGB 13.9 11.5 - 16.0 g/dL    HCT 40.0 35.0 - 47.0 %    .5 (H) 80.0 - 99.0 FL    MCH 34.9 (H) 26.0 - 34.0 PG    MCHC 34.8 30.0 - 36.5 g/dL    RDW 12.6 11.5 - 14.5 %    PLATELET 954 (L) 878 - 400 K/uL MPV 10.2 8.9 - 12.9 FL    NRBC 0.0 0  WBC    ABSOLUTE NRBC 0.00 0.00 - 0.01 K/uL    NEUTROPHILS 79 (H) 32 - 75 %    BAND NEUTROPHILS 3 %    LYMPHOCYTES 11 (L) 12 - 49 %    MONOCYTES 7 5 - 13 %    EOSINOPHILS 0 0 - 7 %    BASOPHILS 0 0 - 1 %    IMMATURE GRANULOCYTES 0 0.0 - 0.5 %    ABS. NEUTROPHILS 13.7 (H) 1.8 - 8.0 K/UL    ABS. LYMPHOCYTES 1.8 0.8 - 3.5 K/UL    ABS. MONOCYTES 1.2 (H) 0.0 - 1.0 K/UL    ABS. EOSINOPHILS 0.0 0.0 - 0.4 K/UL    ABS. BASOPHILS 0.0 0.0 - 0.1 K/UL    ABS. IMM. GRANS. 0.0 0.00 - 0.04 K/UL    DF MANUAL      RBC COMMENTS NORMOCYTIC, NORMOCHROMIC     PHENYTOIN    Collection Time: 04/08/19  5:45 AM   Result Value Ref Range    Phenytoin 25.8 (HH) 10.0 - 20.0 ug/mL   CULTURE, STOOL    Collection Time: 04/08/19  7:33 AM   Result Value Ref Range    Special Requests: NO SPECIAL REQUESTS      Campylobacter antigen NEGATIVE      Culture result: PENDING    WBC, STOOL    Collection Time: 04/08/19  7:33 AM   Result Value Ref Range    White blood cells, stool >20 0 - 4 /HPF   OCCULT BLOOD, STOOL    Collection Time: 04/08/19  1:34 PM   Result Value Ref Range    Occult blood, stool POSITIVE (A) NEG       CT Results (most recent):  Results from Hospital Encounter encounter on 04/07/19   CT ABD PELV W CONT    Narrative EXAM: CT ABD PELV W CONT    INDICATION: Abdominal pain; Diarrhea    COMPARISON: None     CONTRAST: 100 mL of Isovue-370. TECHNIQUE:   Following the uneventful intravenous administration of contrast, thin axial  images were obtained through the abdomen and pelvis. Coronal and sagittal  reconstructions were generated. Oral contrast was not administered. CT dose  reduction was achieved through use of a standardized protocol tailored for this  examination and automatic exposure control for dose modulation. FINDINGS:   LUNG BASES: Clear. INCIDENTALLY IMAGED HEART AND MEDIASTINUM: Unremarkable. LIVER: No mass or biliary dilatation. GALLBLADDER: Unremarkable.   SPLEEN: No mass.  PANCREAS: No mass or ductal dilatation. ADRENALS: Unremarkable. KIDNEYS: No mass, calculus, or hydronephrosis. STOMACH: Unremarkable. SMALL BOWEL: No dilatation or wall thickening. COLON: Marked thickening and inflammatory change surrounding the transverse  colon. .  APPENDIX: Surgically absent  PERITONEUM: No ascites or pneumoperitoneum. RETROPERITONEUM: No lymphadenopathy or aortic aneurysm. REPRODUCTIVE ORGANS: Anteverted uterus  URINARY BLADDER: No mass or calculus. BONES: No destructive bone lesion. ADDITIONAL COMMENTS: N/A      Impression IMPRESSION:  Acute colitis of the transverse colon     XR Results (most recent):  Results from Hospital Encounter encounter on 04/07/19   XR CHEST PORT    Narrative EXAM: XR CHEST PORT    INDICATION: Fever. meets SIRS criteria    COMPARISON: None. FINDINGS: A portable AP radiograph of the chest was obtained at 2215 hours. The  patient is on a cardiac monitor. There is a vague infiltrate in the left upper  lung. The cardiac and mediastinal contours and pulmonary vascularity are normal.   The bones and soft tissues are grossly within normal limits. Impression IMPRESSION: Left upper lung pneumonia         Assessment/Plan:     Active Problems:    Sepsis ([address] (4/7/2019)      Diarrhea (4/8/2019)      Colitis (4/8/2019)      NSAID long-term use (4/8/2019)      Blood in stool (4/8/2019)      Overall pt is presenting with sepsis that was initially presumed to be due to UTI that has been treated with multiple abx recently. She started having diarrhea with visible blood in the stool on Sunday and colitis was seen on CT. Need to evaluate for infectious source to colitis including C Diff. May also be an ischemic source or inflammatory source secondary to chronic NSAID use. Pending C diff results she may need a colonoscopy this admission. Stop meloxicam, continue to monitor hgb, appears stable at this time. Will also check a KUB in the morning.   We will pass pt along to Dr. Abena Swain in the morning if he belongs to him. TINO Goel    04/08/19  4:25 PM    [address] [facility], [address] [place] [address] [place]: [phone]      The patient was seen and examined independently. Please see above note for details. Physical exam:  General:in distress. Mother at bedside. HEENT:  EOMI,   Chest:  CTA,Heart: S1, S2, RRR  GI: Soft, Moderate tenderness, some rebound, tinkling BS  Extremities: No edema    Data Review:  reviewed     Impression:   Diarrhea  Sepsis  Colitis  Leukocytosis    Plan and discussion:  Agree that this could be c diff related colitis but ischemia/inflammatory causes are in the differential.  Agree with PO Vancomycin as she has had multiple course of PO Abx given to her for recurrent UTI. Leukocytosis is a little better. Ice chips for now. Await stool for c diff DNA. Contact isolation  Colonoscopy if above is negative. KUB in am to f.u on CT findings. Consult CRS if she is any worse. Signed By: Lisandro Marte.  Latoya Hill MD    4/8/2019  5:12 PM

## 2019-04-09 ENCOUNTER — APPOINTMENT (OUTPATIENT)
Dept: CT IMAGING | Age: 49
DRG: 871 | End: 2019-04-09
Attending: INTERNAL MEDICINE
Payer: MEDICARE

## 2019-04-09 ENCOUNTER — DOCUMENTATION ONLY (OUTPATIENT)
Dept: FAMILY MEDICINE CLINIC | Age: 49
End: 2019-04-09

## 2019-04-09 ENCOUNTER — APPOINTMENT (OUTPATIENT)
Dept: GENERAL RADIOLOGY | Age: 49
DRG: 871 | End: 2019-04-09
Attending: INTERNAL MEDICINE
Payer: MEDICARE

## 2019-04-09 LAB
ANION GAP SERPL CALC-SCNC: 10 MMOL/L (ref 5–15)
BASOPHILS # BLD: 0 K/UL (ref 0–0.1)
BASOPHILS NFR BLD: 0 % (ref 0–1)
BUN SERPL-MCNC: 11 MG/DL (ref 6–20)
BUN/CREAT SERPL: 27 (ref 12–20)
C DIFF GDH STL QL: NEGATIVE
C DIFF TOX A+B STL QL IA: NEGATIVE
CALCIUM SERPL-MCNC: 7.4 MG/DL (ref 8.5–10.1)
CHLORIDE SERPL-SCNC: 111 MMOL/L (ref 97–108)
CO2 SERPL-SCNC: 17 MMOL/L (ref 21–32)
CREAT SERPL-MCNC: 0.41 MG/DL (ref 0.55–1.02)
DIFFERENTIAL METHOD BLD: ABNORMAL
EOSINOPHIL # BLD: 0.2 K/UL (ref 0–0.4)
EOSINOPHIL NFR BLD: 1 % (ref 0–7)
ERYTHROCYTE [DISTWIDTH] IN BLOOD BY AUTOMATED COUNT: 12.7 % (ref 11.5–14.5)
GLUCOSE SERPL-MCNC: 83 MG/DL (ref 65–100)
HCT VFR BLD AUTO: 39.7 % (ref 35–47)
HGB BLD-MCNC: 13.4 G/DL (ref 11.5–16)
IMM GRANULOCYTES # BLD AUTO: 0.1 K/UL (ref 0–0.04)
IMM GRANULOCYTES NFR BLD AUTO: 0 % (ref 0–0.5)
INTERPRETATION: NORMAL
LYMPHOCYTES # BLD: 2.2 K/UL (ref 0.8–3.5)
LYMPHOCYTES NFR BLD: 15 % (ref 12–49)
MCH RBC QN AUTO: 34.9 PG (ref 26–34)
MCHC RBC AUTO-ENTMCNC: 33.8 G/DL (ref 30–36.5)
MCV RBC AUTO: 103.4 FL (ref 80–99)
MONOCYTES # BLD: 1.4 K/UL (ref 0–1)
MONOCYTES NFR BLD: 10 % (ref 5–13)
NEUTS SEG # BLD: 10.3 K/UL (ref 1.8–8)
NEUTS SEG NFR BLD: 74 % (ref 32–75)
NRBC # BLD: 0 K/UL (ref 0–0.01)
NRBC BLD-RTO: 0 PER 100 WBC
PLATELET # BLD AUTO: 129 K/UL (ref 150–400)
PMV BLD AUTO: 10.3 FL (ref 8.9–12.9)
POTASSIUM SERPL-SCNC: 3.5 MMOL/L (ref 3.5–5.1)
RBC # BLD AUTO: 3.84 M/UL (ref 3.8–5.2)
SODIUM SERPL-SCNC: 138 MMOL/L (ref 136–145)
WBC # BLD AUTO: 14.1 K/UL (ref 3.6–11)

## 2019-04-09 PROCEDURE — 74011000250 HC RX REV CODE- 250: Performed by: PHYSICIAN ASSISTANT

## 2019-04-09 PROCEDURE — 74011250636 HC RX REV CODE- 250/636: Performed by: INTERNAL MEDICINE

## 2019-04-09 PROCEDURE — 74011250637 HC RX REV CODE- 250/637: Performed by: INTERNAL MEDICINE

## 2019-04-09 PROCEDURE — 70481 CT ORBIT/EAR/FOSSA W/DYE: CPT

## 2019-04-09 PROCEDURE — 85025 COMPLETE CBC W/AUTO DIFF WBC: CPT

## 2019-04-09 PROCEDURE — 80048 BASIC METABOLIC PNL TOTAL CA: CPT

## 2019-04-09 PROCEDURE — 65660000000 HC RM CCU STEPDOWN

## 2019-04-09 PROCEDURE — 74018 RADEX ABDOMEN 1 VIEW: CPT

## 2019-04-09 PROCEDURE — 94760 N-INVAS EAR/PLS OXIMETRY 1: CPT

## 2019-04-09 PROCEDURE — 74011636320 HC RX REV CODE- 636/320: Performed by: INTERNAL MEDICINE

## 2019-04-09 PROCEDURE — 36415 COLL VENOUS BLD VENIPUNCTURE: CPT

## 2019-04-09 RX ORDER — LEVOFLOXACIN 5 MG/ML
750 INJECTION, SOLUTION INTRAVENOUS EVERY 24 HOURS
Status: DISCONTINUED | OUTPATIENT
Start: 2019-04-10 | End: 2019-04-09 | Stop reason: DRUGHIGH

## 2019-04-09 RX ORDER — POLYETHYLENE GLYCOL 3350, SODIUM SULFATE ANHYDROUS, SODIUM BICARBONATE, SODIUM CHLORIDE, POTASSIUM CHLORIDE 236; 22.74; 6.74; 5.86; 2.97 G/4L; G/4L; G/4L; G/4L; G/4L
2000 POWDER, FOR SOLUTION ORAL ONCE
Status: COMPLETED | OUTPATIENT
Start: 2019-04-09 | End: 2019-04-09

## 2019-04-09 RX ORDER — SODIUM CHLORIDE 0.9 % (FLUSH) 0.9 %
10 SYRINGE (ML) INJECTION
Status: COMPLETED | OUTPATIENT
Start: 2019-04-09 | End: 2019-04-09

## 2019-04-09 RX ORDER — LEVOFLOXACIN 5 MG/ML
750 INJECTION, SOLUTION INTRAVENOUS EVERY 24 HOURS
Status: DISCONTINUED | OUTPATIENT
Start: 2019-04-09 | End: 2019-04-10

## 2019-04-09 RX ORDER — METRONIDAZOLE 500 MG/100ML
500 INJECTION, SOLUTION INTRAVENOUS EVERY 8 HOURS
Status: DISCONTINUED | OUTPATIENT
Start: 2019-04-09 | End: 2019-04-15 | Stop reason: HOSPADM

## 2019-04-09 RX ADMIN — METRONIDAZOLE 500 MG: 500 INJECTION, SOLUTION INTRAVENOUS at 14:48

## 2019-04-09 RX ADMIN — Medication 10 ML: at 00:14

## 2019-04-09 RX ADMIN — LEVOFLOXACIN 750 MG: 5 INJECTION, SOLUTION INTRAVENOUS at 09:42

## 2019-04-09 RX ADMIN — FLUOXETINE HYDROCHLORIDE 20 MG: 20 CAPSULE ORAL at 08:54

## 2019-04-09 RX ADMIN — HEPARIN SODIUM 5000 UNITS: 5000 INJECTION INTRAVENOUS; SUBCUTANEOUS at 00:00

## 2019-04-09 RX ADMIN — POLYETHYLENE GLYCOL 3350, SODIUM SULFATE ANHYDROUS, SODIUM BICARBONATE, SODIUM CHLORIDE, POTASSIUM CHLORIDE 2000 ML: 236; 22.74; 6.74; 5.86; 2.97 POWDER, FOR SOLUTION ORAL at 18:00

## 2019-04-09 RX ADMIN — PHENYTOIN SODIUM 200 MG: 100 CAPSULE, EXTENDED RELEASE ORAL at 08:53

## 2019-04-09 RX ADMIN — Medication 10 ML: at 06:07

## 2019-04-09 RX ADMIN — VANCOMYCIN HYDROCHLORIDE 125 MG: KIT at 00:14

## 2019-04-09 RX ADMIN — OXYCODONE HYDROCHLORIDE 5 MG: 5 TABLET ORAL at 23:52

## 2019-04-09 RX ADMIN — HEPARIN SODIUM 5000 UNITS: 5000 INJECTION INTRAVENOUS; SUBCUTANEOUS at 08:54

## 2019-04-09 RX ADMIN — Medication 10 ML: at 21:59

## 2019-04-09 RX ADMIN — IOPAMIDOL 100 ML: 755 INJECTION, SOLUTION INTRAVENOUS at 09:56

## 2019-04-09 RX ADMIN — HYDROMORPHONE HYDROCHLORIDE 1 MG: 1 INJECTION, SOLUTION INTRAMUSCULAR; INTRAVENOUS; SUBCUTANEOUS at 08:55

## 2019-04-09 RX ADMIN — Medication 10 ML: at 14:08

## 2019-04-09 RX ADMIN — SODIUM CHLORIDE 100 ML/HR: 900 INJECTION, SOLUTION INTRAVENOUS at 04:33

## 2019-04-09 RX ADMIN — MIRABEGRON 25 MG: 25 TABLET, FILM COATED, EXTENDED RELEASE ORAL at 09:00

## 2019-04-09 RX ADMIN — Medication 10 ML: at 08:55

## 2019-04-09 RX ADMIN — VANCOMYCIN HYDROCHLORIDE 125 MG: KIT at 06:07

## 2019-04-09 RX ADMIN — METRONIDAZOLE 500 MG: 500 INJECTION, SOLUTION INTRAVENOUS at 22:04

## 2019-04-09 RX ADMIN — ACETAMINOPHEN 650 MG: 325 TABLET ORAL at 22:04

## 2019-04-09 RX ADMIN — BUTALBITAL, ACETAMINOPHEN AND CAFFEINE 1 TABLET: 50; 325; 40 TABLET ORAL at 04:48

## 2019-04-09 NOTE — CONSULTS
Consult    Patient: Asiya Munoz MRN: [id number]  SSN: [id number]    YOB: 1970  Age: 51 y.o. Sex: female      Subjective:      Asiya Munoz is a 51 y.o. female who is being seen for colitis in the transverse colon. Her mother describes chronic recurrent UTIs and bilateral ear infections which occur about every few weeks. It's gotten to a point where her ENT physician wants to place tympanostomy tubes. Diarrhea with maroon colored stool started around Sunday and that's when she describes having the abdominal pain. She also had a fever which has since subsided. She has never had a colonoscopy.        Past Medical History:   Diagnosis Date    Bladder incontinence      Past Surgical History:   Procedure Laterality Date    HX APPENDECTOMY      HX OTHER SURGICAL  2002    remove a lypoma      Family History   Problem Relation Age of Onset    Heart Disease Father      Social History     Tobacco Use    Smoking status: Never Smoker    Smokeless tobacco: Never Used   Substance Use Topics    Alcohol use: No      Current Facility-Administered Medications   Medication Dose Route Frequency Provider Last Rate Last Dose    levoFLOXacin (LEVAQUIN) 750 mg in D5W IVPB  750 mg IntraVENous Q24H Babak Root  mL/hr at 04/09/19 0942 750 mg at 04/09/19 0942    topiramate ER (TROKENDI XR) capsule 400 mg (Patient Supplied)  400 mg Oral QHS Kiki Peters MD   400 mg at 04/08/19 1953    phenytoin ER (DILANTIN ER) ER capsule 200 mg (Patient Supplied)  200 mg Oral DAILY Kiki Peters MD   200 mg at 04/09/19 0853    phenytoin ER (DILANTIN ER) ER capsule 330 mg (Patient Supplied)  330 mg Oral QHS Kiki Peters MD   330 mg at 04/08/19 1952    oxyCODONE IR (ROXICODONE) tablet 5 mg  5 mg Oral Q6H PRN Kiki Peters MD        HYDROmorphone (PF) (DILAUDID) injection 1 mg  1 mg IntraVENous Q6H PRN Salina JOSE MD   1 mg at 04/09/19 0855    sodium chloride (NS) flush 5-10 mL  5-10 mL IntraVENous PRN Kari Tucker MD        butalbital-acetaminophen-caffeine (FIORICET, ESGIC) -40 mg per tablet 1 Tab  1 Tab Oral Q6H PRN Kari Tucker MD   1 Tab at 04/09/19 0448    FLUoxetine (PROzac) capsule 20 mg  20 mg Oral DAILY Kari Tucker MD   20 mg at 04/09/19 0854    mirabegron ER (MYRBETRIQ) tablet 25 mg  25 mg Oral DAILY Kari Tucker MD   25 mg at 04/09/19 0900    sodium chloride (NS) flush 5-40 mL  5-40 mL IntraVENous Q8H Kari Tucker MD   10 mL at 04/09/19 0607    sodium chloride (NS) flush 5-40 mL  5-40 mL IntraVENous PRN Kari Tucker MD        acetaminophen (TYLENOL) tablet 650 mg  650 mg Oral Q6H PRN Kari Tucker MD   650 mg at 04/08/19 1241    ondansetron (ZOFRAN) injection 4 mg  4 mg IntraVENous Q6H PRN Kari Tucker MD        docusate sodium (COLACE) capsule 100 mg  100 mg Oral DAILY PRN Kari Tucker MD        heparin (porcine) injection 5,000 Units  5,000 Units SubCUTAneous Q8H Kari Tucker MD   5,000 Units at 04/09/19 0854    0.9% sodium chloride infusion  100 mL/hr IntraVENous CONTINUOUS Kari Tucker  mL/hr at 04/09/19 0433 100 mL/hr at 04/09/19 0433    vancomycin (FIRVANQ) 50 mg/mL oral solution 125 mg  125 mg Oral Q6H Kari Tucker MD   125 mg at 04/09/19 0607        Allergies   Allergen Reactions    Adhesive Tape-Silicones Other (comments)       Review of Systems:  A comprehensive review of systems was negative except for that written in the History of Present Illness. Objective:     Vitals:    04/09/19 0316 04/09/19 0613 04/09/19 0724 04/09/19 1150   BP: 143/83  (!) 130/93 122/89   Pulse: 92  85 75   Resp: 18  21 27   Temp: 98.5 °F (36.9 °C)  98.8 °F (37.1 °C) 98.2 °F (36.8 °C)   SpO2: 95%  95% 94%   Weight:  107 kg (235 lb 14.3 oz)     Height:            Physical Exam:  General:  Alert, cooperative, no distress, appears stated age. Eyes:  Conjunctivae/corneas clear. PERRL, EOMs intact.  Fundi benign   Ears:  Normal TMs and external ear canals both ears. Nose: Nares normal. Septum midline. Mucosa normal. No drainage or sinus tenderness. Mouth/Throat: Lips, mucosa, and tongue normal. Teeth and gums normal.   Neck: Supple, symmetrical, trachea midline, no adenopathy, thyroid: no enlargment/tenderness/nodules, no carotid bruit and no JVD. Back:   Symmetric, no curvature. ROM normal. No CVA tenderness. Lungs:   Clear to auscultation bilaterally. Heart:  Regular rate and rhythm, S1, S2 normal, no murmur, click, rub or gallop. Abdomen:   Soft, TTP in mid-epigastrium. No rebound/guarding. Extremities: Extremities normal, atraumatic, no cyanosis or edema. Pulses: 2+ and symmetric all extremities. Skin: Skin color, texture, turgor normal. No rashes or lesions   Lymph nodes: Cervical, supraclavicular, and axillary nodes normal.   Neurologic: CNII-XII intact. Normal strength, sensation and reflexes throughout. Assessment:     Hospital Problems  Date Reviewed: 1/5/2017          Codes Class Noted POA    Diarrhea ICD-10-CM: R19.7  ICD-9-CM: 787.91  4/8/2019 Unknown        Colitis ICD-10-CM: K52.9  ICD-9-CM: 558.9  4/8/2019 Unknown        NSAID long-term use ICD-10-CM: Z79.1  ICD-9-CM: V58.64  4/8/2019 Unknown        Blood in stool ICD-10-CM: K92.1  ICD-9-CM: 578.1  4/8/2019 Unknown        Seps[facility] ICD-10-CM: A41.9  ICD-9-CM: 038.9, 995.91  4/7/2019 Unknown              Plan:     48F with infectious vs. Ischemic colitis. C diff was negative. Ischemia is in the DDx although an odd distribution being isolated in the transverse colon. This is not a typical watershed area, but she certain has pain and bloody BMs. Crohn's disease also needs to be on the differential.  I would certainly agree with colonoscopy. Defer to Dr. Dayton Mortimer for this. I will be available if this turns into toxic colitis. Otherwise, continue abx and bowel rest/monitoring. No acute need for surgery at this time.     Signed By: Metro Kawasaki, MD     April 9, 2019

## 2019-04-09 NOTE — CONSULTS
Infectious Disease Consult  Francis Rolon MD [facility]    Date of Consultation:  April 8,2019    Referring Physician: Dr Jorge L Moore:     Patient is a 51 y.o. female who is being seen for - . recurrent UTI,  has been on multiple rounds of abx.  family request     IMPRESSION:   · Sepsis   · Fevers with vomiting , diarrhea, previously treated for UTI & thereafter B/L ear infecions  · Colitis of transverse colon on CT , diarrhea with blood + , possible acute C.diff colitis  · MANUEL  Pneumonia on CXR   · Currently no evidence of UTI , persisting  ear symptoms +  · H/o recurrent B/l ear infections since childhood , plan was for tube placement by ENT  · H/o recurrent UTI since childhood , worse over past 3 years  · Congenitalstatic encephalopathy & seizure disorder       PLAN:      · Continue Po Vancomycin pending C.diff test results, Levaquin for pneumonia  · Urology office notes & /or Urology input regarding reason for recurrent UTI  · CT middle ear / tympanic membranes to evaluate ears  · Plan of care D/w pt's mom     Carlie Rinne is a 51 y.o. female, pmh significant for  hydrocephalus, cranial AVM, recurrent urinary tract infections  & bilateral ear infections since child white who presented  to the ED with c/o fever, nausea/vomiting/diarrhea, abdominal pain. As per ED note Mother reported  That  patient had been treated for recurrent urinary tract infections which have become  worse over over the last 3 years by Dr. Ulises Pearce with [facility]. Since February  She has had 2 courses of Augmentin. Patient noted to have been changed over to Ceftin after last course of Augmentin. Last tablet was on day of admission. Patient also seen by primary care doctor 1 week ago for bilateral ear pain. Noted to have bilateral ear infections for which she was given eardrops. Mother reports the patient had escalating fever over the last several days noted to be 102 on morning of admission.     Patient also reports having generalized headache, chills and periumbilical abdominal pain. Patient is followed by Dr. London Gonzalez for her neurologic issues and seizure disorder. She does not have a  shunt   Pt seen today with mom at bedside. Pt drowsy as she just ot dilaudid . Mom is giving history . She has been having vomiting x 4 days & abdominal pan diarrhea x 1 day . No dysuria. She has some swelling around ears that indicates ear infections. Currently no ear pain, abdominal pain +  Imaging done on 4/7 breast cancer , UA unremarkable , C.diff pending  CT abd/pelvis  IMPRESSION:  Acute colitis of the transverse colon    CXR - LULpneumonia      Patient Active Problem List   Diagnosis Code    Seizure ([facility] R56.9    Chronic migraine G43.709    Mental developmental delay F81.9    Severe obesity (BMI 35.0-39. 9) E66.01    [facility] A41.9    Diarrhea R19.7    Colitis K52.9    NSAID long-term use Z79.1    Blood in stool K92.1     Past Medical History:   Diagnosis Date    Bladder incontinence       Family History   Problem Relation Age of Onset    Heart Disease Father       Social History     Tobacco Use    Smoking status: Never Smoker    Smokeless tobacco: Never Used   Substance Use Topics    Alcohol use: No     Past Surgical History:   Procedure Laterality Date    HX APPENDECTOMY      HX OTHER SURGICAL  2002    remove a lypoma      Prior to Admission medications    Medication Sig Start Date End Date Taking? Authorizing Provider   phenytoin ER (DILANTIN ER) 100 mg ER capsule Take 200 mg by mouth daily. Yes Provider, Historical   phenytoin ER (DILANTIN ER) 100 mg ER capsule Take 300 mg by mouth nightly. Take (3) 100 mg capsules along with a 30 mg capsule for a total dose of 330 mg at bedtime   Yes Provider, Historical   phenytoin ER (DILANTIN ER) 30 mg ER capsule Take 30 mg by mouth nightly.  Takes a 30 mg capsule along with (3) 100 mg capsules for a total dose of 330 mg at bedtime   Yes Provider, Historical   topiramate ER (TROKENDI XR) 200 mg capsule Take 400 mg by mouth nightly. Yes Provider, Historical   FLUoxetine (PROZAC) 20 mg capsule Take 20 mg by mouth nightly. Yes Provider, Historical   mirabegron ER (MYRBETRIQ) 25 mg ER tablet Take 25 mg by mouth nightly. Yes Provider, Historical     Allergies   Allergen Reactions    Adhesive Tape-Silicones Other (comments)        Review of Systems:  A comprehensive review of systems was negative except for that written in the History of Present Illness. 10 point review of systems obtained . All other systems negative    Objective:   Blood pressure 178/87, pulse 89, temperature 99.5 °F (37.5 °C), resp. rate 18, height 5' 5\" (1.651 m), weight 223 lb (101.2 kg), SpO2 94 %.   Temp (24hrs), Av.3 °F (37.4 °C), Min:98.7 °F (37.1 °C), Max:99.7 °F (37.6 °C)    Current Facility-Administered Medications   Medication Dose Route Frequency    topiramate ER (TROKENDI XR) capsule 400 mg (Patient Supplied)  400 mg Oral QHS    phenytoin ER (DILANTIN ER) ER capsule 200 mg (Patient Supplied)  200 mg Oral DAILY    phenytoin ER (DILANTIN ER) ER capsule 330 mg (Patient Supplied)  330 mg Oral QHS    oxyCODONE IR (ROXICODONE) tablet 5 mg  5 mg Oral Q6H PRN    HYDROmorphone (PF) (DILAUDID) injection 1 mg  1 mg IntraVENous Q6H PRN    sodium chloride (NS) flush 5-10 mL  5-10 mL IntraVENous PRN    butalbital-acetaminophen-caffeine (FIORICET, ESGIC) -40 mg per tablet 1 Tab  1 Tab Oral Q6H PRN    FLUoxetine (PROzac) capsule 20 mg  20 mg Oral DAILY    mirabegron ER (MYRBETRIQ) tablet 25 mg  25 mg Oral DAILY    sodium chloride (NS) flush 5-40 mL  5-40 mL IntraVENous Q8H    sodium chloride (NS) flush 5-40 mL  5-40 mL IntraVENous PRN    acetaminophen (TYLENOL) tablet 650 mg  650 mg Oral Q6H PRN    ondansetron (ZOFRAN) injection 4 mg  4 mg IntraVENous Q6H PRN    docusate sodium (COLACE) capsule 100 mg  100 mg Oral DAILY PRN    heparin (porcine) injection 5,000 Units  5,000 Units SubCUTAneous Q8H  0.9% sodium chloride infusion  100 mL/hr IntraVENous CONTINUOUS    vancomycin (FIRVANQ) 50 mg/mL oral solution 125 mg  125 mg Oral Q6H        Exam:    General:  Resting , arousable  cooperative,   Eyes:  Sclera anicteric. Pupils equally round and reactive to light. Mouth/Throat: Mucous membranes normal, oral pharynx clear   Neck: Supple   Lungs:   Reduced  auscultation basest   CV:  Regular rate and rhythm,no murmur, click, rub or gallop   Abdomen:   Soft, mildly tender mid abdomen. bowel sounds normal. Mildly distended   Extremities: No edema   Skin: Skin color, texture, turgor normal. no acute rash or lesions   Lymph nodes: Cervical and supraclavicular normal   Musculoskeletal: No swelling or deformity   Lines/Devices:  Intact, no erythema, drainage or tenderness   Psych: Awake  and oriented, appears drowsy       Data Review:   CBC:   Recent Labs     04/08/19 0545 04/07/19 2051   WBC 16.7* 23.0*   RBC 3.98 4.85   HGB 13.9 17.0*   HCT 40.0 46.8   * 160   GRANS 79* 81*   LYMPH 11* 5*   EOS 0 1     CMP:   Recent Labs     04/08/19  0545 04/07/19 2051   * 147*    136   K 3.6 3.0*   * 105   CO2 20* 23   BUN 17 21*   CREA 0.55 0.76   CA 7.2* 8.2*   AGAP 8 8   BUCR 31* 28*   AP  --  82   TP  --  7.1   ALB  --  3.3*   GLOB  --  3.8   AGRAT  --  0.9*       Lab Results   Component Value Date/Time    Culture result: PENDING 04/08/2019 07:33 AM    Culture result: NO GROWTH AFTER 10 HOURS 04/07/2019 08:51 PM    Culture result: NO GROWTH AFTER 10 HOURS 04/07/2019 08:51 PM          XR Results (most recent):  Results from Hospital Encounter encounter on 04/07/19   XR CHEST PORT    Narrative EXAM: XR CHEST PORT    INDICATION: Fever. meets SIRS criteria    COMPARISON: None. FINDINGS: A portable AP radiograph of the chest was obtained at 2215 hours. The  patient is on a cardiac monitor. There is a vague infiltrate in the left upper  lung.  The cardiac and mediastinal contours and pulmonary vascularity are normal.   The bones and soft tissues are grossly within normal limits. Impression IMPRESSION: Left upper lung pneumonia             ICD-10-CM ICD-9-CM    1. Acute cystitis without hematuria N30.00 595.0    2. Noninfectious gastroenteritis, unspecified type K52.9 558. 9            Antibiotic History  Vancomycin po    I have discussed the diagnosis with the patient and the intended plan as seen in the above orders. I have discussed medication side effects and warnings with the patient as well.     Reviewed test results at length with patient    Signed By: Bre Case MD FACP

## 2019-04-09 NOTE — PROGRESS NOTES
Problem: Risk for Spread of Infection  Goal: Prevent transmission of infectious organism to others  Description  Prevent the transmission of infectious organisms to other patients, staff members, and visitors. Outcome: Progressing Towards Goal     Problem: Falls - Risk of  Goal: *Absence of Falls  Description  Document Kassy Manuel Fall Risk and appropriate interventions in the flowsheet.   Outcome: Progressing Towards Goal  Note:   Fall Risk Interventions:  Mobility Interventions: Communicate number of staff needed for ambulation/transfer, OT consult for ADLs, Patient to call before getting OOB, PT Consult for mobility concerns, PT Consult for assist device competence, Strengthening exercises (ROM-active/passive), Utilize walker, cane, or other assistive device         Medication Interventions: Evaluate medications/consider consulting pharmacy, Patient to call before getting OOB, Teach patient to arise slowly

## 2019-04-09 NOTE — PROGRESS NOTES
Hospitalist Progress Note    NAME: Sabi Course   :  1970   MRN:  [id number]       Assessment / Plan:  Severe sepsis in the setting of acute colitis and UTI  Acute colitis, concerning for Cdiff infection given multiple abx exposure  PNA  -CXR showed PNA  -CT a/p showed colitis in the transverse colon  -stool cx and cdiff neg  -FOBT positive,  hgb stable  -on CLD, advance as tolerated  -stop PO vancomycin  -cont' empiric levaquin and flagyl, discussed with ID  -oxycodone prn/IV dilaudid prn  -GI/CRS consultation    Recurrent UTI   -UA suggestive of UTI, f/u with Ucx  -pt recently completed 2 rounds of agumentin and 1 round of ceftin prior to admission per pt's mother  -she takes prophylactic macrobid daily for recurrent UTI, prescribed by her urologist  -on levaquin    History of seizure disorder  -seizure precaution, chronic elevation of phenytoin level, appreciate neuro's consultation   -resume PTA seizure meds    Hypokalemia  -repleted, WNL today    History of chronic hydrocephalus  -supportive care. Pt is independent of ADLs    Obesity- Body mass index is 39.25 kg/m². Code status: Full  Prophylaxis: Hep SQ  Recommended Disposition:  PT, OT, RN     Subjective:     Chief Complaint / Reason for Physician Visit  Pt seen, tolerating current diet. abd pain but improves with dilaudid. Discussed with RN events overnight. Review of Systems:  Symptom Y/N Comments  Symptom Y/N Comments   Fever/Chills n   Chest Pain     Poor Appetite    Edema     Cough    Abdominal Pain y mild   Sputum    Joint Pain     SOB/GOLDEN n   Pruritis/Rash     Nausea/vomit    Tolerating PT/OT     Diarrhea y   Tolerating Diet     Constipation    Other       Could NOT obtain due to:      Objective:     VITALS:   Last 24hrs VS reviewed since prior progress note.  Most recent are:  Patient Vitals for the past 24 hrs:   Temp Pulse Resp BP SpO2   19 1150 98.2 °F (36.8 °C) 75 27 122/89 94 %   19 0724 98.8 °F (37.1 °C) 85 21 (!) 130/93 95 %   04/09/19 0316 98.5 °F (36.9 °C) 92 18 143/83 95 %   04/09/19 0048 99.5 °F (37.5 °C) 89 18 178/87 94 %   04/08/19 1949 99.3 °F (37.4 °C) 91 18 (!) 149/99 97 %   04/08/19 1604 98.7 °F (37.1 °C) 86 16 171/90 96 %       Intake/Output Summary (Last 24 hours) at 4/9/2019 1321  Last data filed at 4/9/2019 0930  Gross per 24 hour   Intake 2831.67 ml   Output --   Net 2831.67 ml        PHYSICAL EXAM:  General: WD, WN. Alert, NAD  EENT:  EOMI. Anicteric sclerae. MMM  Resp:  CTA bilaterally, no wheezing or rales. No accessory muscle use  CV:  Regular  rhythm,  No edema  GI:  Soft, Non distended, Non tender.  +Bowel sounds  Neurologic:  Alert and oriented X 3, normal speech  Psych:   Fair insight. Not anxious nor agitated  Skin:  No rashes. No jaundice    Reviewed most current lab test results and cultures  YES  Reviewed most current radiology test results   YES  Review and summation of old records today    NO  Reviewed patient's current orders and MAR    YES  PMH/SH reviewed - no change compared to H&P  ________________________________________________________________________  Care Plan discussed with:    Comments   Patient x    Family  x Pt's mom   RN x    Care Manager     Consultant  x ID/neuro                     Multidiciplinary team rounds were held today with , nursing, pharmacist and clinical coordinator. Patient's plan of care was discussed; medications were reviewed and discharge planning was addressed.      ________________________________________________________________________  Total NON critical care TIME:  35   Minutes    Total CRITICAL CARE TIME Spent:   Minutes non procedure based      Comments   >50% of visit spent in counseling and coordination of care     ________________________________________________________________________  Peyton Marin MD     Procedures: see electronic medical records for all procedures/Xrays and details which were not copied into this note but were reviewed prior to creation of Plan. LABS:  I reviewed today's most current labs and imaging studies.   Pertinent labs include:  Recent Labs     04/09/19 0434 04/08/19 0545 04/07/19 2051   WBC 14.1* 16.7* 23.0*   HGB 13.4 13.9 17.0*   HCT 39.7 40.0 46.8   * 138* 160     Recent Labs     04/09/19 0434 04/08/19 0545 04/07/19 2051    140 136   K 3.5 3.6 3.0*   * 112* 105   CO2 17* 20* 23   GLU 83 118* 147*   BUN 11 17 21*   CREA 0.41* 0.55 0.76   CA 7.4* 7.2* 8.2*   ALB  --   --  3.3*   TBILI  --   --  0.7   SGOT  --   --  43*   ALT  --   --  39       Signed: Aida Dorman MD

## 2019-04-09 NOTE — PROGRESS NOTES
C. Diff is negative. Can stop oral vancomycin. We will do a colonoscopy tomorrow to evaluate for ischemic colitis. Given colon dilation, we will not use the full 4L of prep. We will start with 1L of prep and if she tolerates it, do a 2nd liter.       TINO Arriaga  04/09/19  4:50 PM

## 2019-04-09 NOTE — PROGRESS NOTES
Gastroenterology Progress Note    4/9/2019    Admit Date: 4/7/2019    Subjective: Follow up for:  1)  Colitis    2) Dilated transverse colon    Patient is on Clear Liquid diet. Poor appetite but no N/V. Pain: Patient complains of abdominal pain yes. The pain is located in the epigastrium. The pain is described as aching, and is improved with dilaudid. Took dilaudid yesterday and slept through the night. Took it again this AM for abd pain. Bowel Movements: small, firm BM this AM.  Light red blood on toilet paper.                 Current Facility-Administered Medications   Medication Dose Route Frequency    levoFLOXacin (LEVAQUIN) 750 mg in D5W IVPB  750 mg IntraVENous Q24H    topiramate ER (TROKENDI XR) capsule 400 mg (Patient Supplied)  400 mg Oral QHS    phenytoin ER (DILANTIN ER) ER capsule 200 mg (Patient Supplied)  200 mg Oral DAILY    phenytoin ER (DILANTIN ER) ER capsule 330 mg (Patient Supplied)  330 mg Oral QHS    oxyCODONE IR (ROXICODONE) tablet 5 mg  5 mg Oral Q6H PRN    HYDROmorphone (PF) (DILAUDID) injection 1 mg  1 mg IntraVENous Q6H PRN    sodium chloride (NS) flush 5-10 mL  5-10 mL IntraVENous PRN    butalbital-acetaminophen-caffeine (FIORICET, ESGIC) -40 mg per tablet 1 Tab  1 Tab Oral Q6H PRN    FLUoxetine (PROzac) capsule 20 mg  20 mg Oral DAILY    mirabegron ER (MYRBETRIQ) tablet 25 mg  25 mg Oral DAILY    sodium chloride (NS) flush 5-40 mL  5-40 mL IntraVENous Q8H    sodium chloride (NS) flush 5-40 mL  5-40 mL IntraVENous PRN    acetaminophen (TYLENOL) tablet 650 mg  650 mg Oral Q6H PRN    ondansetron (ZOFRAN) injection 4 mg  4 mg IntraVENous Q6H PRN    docusate sodium (COLACE) capsule 100 mg  100 mg Oral DAILY PRN    heparin (porcine) injection 5,000 Units  5,000 Units SubCUTAneous Q8H    0.9% sodium chloride infusion  100 mL/hr IntraVENous CONTINUOUS    vancomycin (FIRVANQ) 50 mg/mL oral solution 125 mg  125 mg Oral Q6H        Objective:     Blood pressure (!) 130/93, pulse 85, temperature 98.8 °F (37.1 °C), resp. rate 21, height 5' 5\" (1.651 m), weight 107 kg (235 lb 14.3 oz), SpO2 95 %. 04/09 0701 - 04/09 1900  In: 240 [P.O.:240]  Out: -     04/07 1901 - 04/09 0700  In: 6827.7 [I.V.:6827.7]  Out: -     EXAM:   Gen: pleasant obese WF with mental retardation in NAD. Slightly lethargic (received dilaudid this AM)  HEENT: NCAT. Sclera anicteric. Lungs: CTA B. Heart: RRR. No M/R/G  Abd: Full and slightly distended in upper abd. Not tympanic. High pitched tinkering bowel sounds in upper abd. Normal bowel sounds in lower abd. Tender in epigastrium and RUQ. No guarding or rebounding. Ext: No pitting edema.      Data Review    Recent Results (from the past 24 hour(s))   OCCULT BLOOD, STOOL    Collection Time: 04/08/19  1:34 PM   Result Value Ref Range    Occult blood, stool POSITIVE (A) NEG     METABOLIC PANEL, BASIC    Collection Time: 04/09/19  4:34 AM   Result Value Ref Range    Sodium 138 136 - 145 mmol/L    Potassium 3.5 3.5 - 5.1 mmol/L    Chloride 111 (H) 97 - 108 mmol/L    CO2 17 (L) 21 - 32 mmol/L    Anion gap 10 5 - 15 mmol/L    Glucose 83 65 - 100 mg/dL    BUN 11 6 - 20 MG/DL    Creatinine 0.41 (L) 0.55 - 1.02 MG/DL    BUN/Creatinine ratio 27 (H) 12 - 20      GFR est AA >60 >60 ml/min/1.73m2    GFR est non-AA >60 >60 ml/min/1.73m2    Calcium 7.4 (L) 8.5 - 10.1 MG/DL   CBC WITH AUTOMATED DIFF    Collection Time: 04/09/19  4:34 AM   Result Value Ref Range    WBC 14.1 (H) 3.6 - 11.0 K/uL    RBC 3.84 3.80 - 5.20 M/uL    HGB 13.4 11.5 - 16.0 g/dL    HCT 39.7 35.0 - 47.0 %    .4 (H) 80.0 - 99.0 FL    MCH 34.9 (H) 26.0 - 34.0 PG    MCHC 33.8 30.0 - 36.5 g/dL    RDW 12.7 11.5 - 14.5 %    PLATELET 647 (L) 488 - 400 K/uL    MPV 10.3 8.9 - 12.9 FL    NRBC 0.0 0  WBC    ABSOLUTE NRBC 0.00 0.00 - 0.01 K/uL    NEUTROPHILS 74 32 - 75 %    LYMPHOCYTES 15 12 - 49 %    MONOCYTES 10 5 - 13 %    EOSINOPHILS 1 0 - 7 %    BASOPHILS 0 0 - 1 % IMMATURE GRANULOCYTES 0 0.0 - 0.5 %    ABS. NEUTROPHILS 10.3 (H) 1.8 - 8.0 K/UL    ABS. LYMPHOCYTES 2.2 0.8 - 3.5 K/UL    ABS. MONOCYTES 1.4 (H) 0.0 - 1.0 K/UL    ABS. EOSINOPHILS 0.2 0.0 - 0.4 K/UL    ABS. BASOPHILS 0.0 0.0 - 0.1 K/UL    ABS. IMM. GRANS. 0.1 (H) 0.00 - 0.04 K/UL    DF AUTOMATED       Recent Labs     04/09/19 0434 04/08/19 0545   WBC 14.1* 16.7*   HGB 13.4 13.9   HCT 39.7 40.0   * 138*     Recent Labs     04/09/19 0434 04/08/19 0545 04/07/19 2051    140 136   K 3.5 3.6 3.0*   * 112* 105   CO2 17* 20* 23   BUN 11 17 21*   CREA 0.41* 0.55 0.76   GLU 83 118* 147*   CA 7.4* 7.2* 8.2*     Recent Labs     04/07/19 2051   SGOT 43*   ALT 39   AP 82   TBILI 0.7   TP 7.1   ALB 3.3*   GLOB 3.8     No results for input(s): INR, PTP, APTT in the last 72 hours. No lab exists for component: INREXT   No results for input(s): FE, TIBC, PSAT, FERR in the last 72 hours. No results found for: FOL, RBCF   No results for input(s): PH, PCO2, PO2 in the last 72 hours. No results for input(s): CPK, CKNDX, TROIQ in the last 72 hours.     No lab exists for component: CPKMB  No results found for: CHOL, CHOLX, CHLST, CHOLV, HDL, LDL, LDLC, DLDLP, TGLX, TRIGL, TRIGP, CHHD, CHHDX  No results found for: Zoey Nguyen  Lab Results   Component Value Date/Time    Color BAUTISTA 04/07/2019 08:53 PM    Appearance CLOUDY (A) 04/07/2019 08:53 PM    Specific gravity 1.030 04/07/2019 08:53 PM    pH (UA) 5.5 04/07/2019 08:53 PM    Protein 30 (A) 04/07/2019 08:53 PM    Glucose NEGATIVE  04/07/2019 08:53 PM    Ketone TRACE (A) 04/07/2019 08:53 PM    Urobilinogen 0.2 04/07/2019 08:53 PM    Nitrites POSITIVE (A) 04/07/2019 08:53 PM    Leukocyte Esterase SMALL (A) 04/07/2019 08:53 PM    Epithelial cells FEW 04/07/2019 08:53 PM    Bacteria NEGATIVE  04/07/2019 08:53 PM    WBC 0-4 04/07/2019 08:53 PM    RBC 0-5 04/07/2019 08:53 PM           Assessment:     Active Problems:    Sepsis ([email or web address] (4/7/2019)      Diarrhea (4/8/2019)      Colitis (4/8/2019)      NSAID long-term use (4/8/2019)      Blood in stool (4/8/2019)        Plan:     WBC has improved from 16.7 to 14.1. Tm 99.5. She is on oral vancomycin for possible C. Diff. C. Diff test pending and should be resulted later this afternoon. Also in the DDX is ischemic colitis. Stool WBC >20. Stool culture is in process. Neg for campylobacter so far and doubt this is any other sort of infectious colitis given all the recent abx she's received. If C. Diff is positive, would add Payton-Q and continue oral Vancomycin with deescalation of other abx ASAP. Appreciate ID's help. If C. Diff is negative, would consider colonoscopy tomorrow to assess for ischemic colitis. Given dilated colon with 6cm transverse colon and worsening since yesterday, we will follow closely with repeat KUB in the AM and consult CRS: Dr. Juvenal Beltran. Patient and mother advised to call if abd pain worsens and/or N/V develops. TINO Ayala  04/09/19  10:49 AM    She has high pitched BS. Abdomen  on my exam but better than yesterday(Dilaudid was given). KUB reviewed. Repeat tomorrow. WBC is lower, Low grade fever. On PO vancomycin and IV Levaquin(for PNA per ID), Await C dif DNA to decide on timing of colonoscopy,if needed. Agree with CRS consult. Liquids only for now. I spoke with her mother in detail. I have personally reviewed the history and independently examined the patient. I have reviewed the chart and agree with the documentation recorded by the Mid Level Provider, including the assessment, treatment plan, and disposition. Michelle Irizarry MD

## 2019-04-09 NOTE — CONSULTS
NEUROLOGY NOTE     Chief Complaint   Patient presents with    Fever    (LUTS) Lower Urinary Tract Symptoms     has been treated several times for UTI by DR Jeannie Malave since January and now with a double ear infection. Has been running fevers now as high as 102 with diarrhea, nausea, vomiting and mid abdominal pain.  Ear Pain    Headache    Abdominal Pain       Reason for Consult  I have been asked by Pop Lyons MD to see the patient in neurological consultation to render advice and opinion regarding seizures. HPI  Linn Hicks is a 51 y.o. female. Neurology has been consulted because of hx of seizures and supra therapeutic dilantin level. Pt started having seizures at the age of 8 months and was initially on phenobarbital. She has also tried lamictal, tegretol and trileptal in the past. She is presently on trokendi 400 mg po qhs and dilantin 200 mg in am and 330 mg at night. Her last seizure was in end of Jan when she did have URTI otherwise her seizures are well controlled. When she has a seizure, she does have a glassy look and then she does have jerking of the right side. No bladder or bowel incontinence or any tongue bite. Although his dilantin level is elevated, whenever her dilantin dose is decreased, she ends up having a seizure. CT head is unchanged.      ROS  A ten system review of constitutional, cardiovascular, respiratory, musculoskeletal, endocrine, skin, SHEENT, genitourinary, psychiatric and neurologic systems was obtained and is unremarkable except as stated in HPI     PMH  Past Medical History:   Diagnosis Date    Bladder incontinence        FH  Family History   Problem Relation Age of Onset    Heart Disease Father        SH  Social History     Socioeconomic History    Marital status: SINGLE     Spouse name: Not on file    Number of children: Not on file    Years of education: Not on file    Highest education level: Not on file   Tobacco Use    Smoking status: Never Smoker    Smokeless tobacco: Never Used   Substance and Sexual Activity    Alcohol use: No       ALLERGIES  Allergies   Allergen Reactions    Adhesive Tape-Silicones Other (comments)       PHYSICAL EXAMINATION:   Patient Vitals for the past 24 hrs:   Temp Pulse Resp BP SpO2   04/09/19 1150 98.2 °F (36.8 °C) 75 27 122/89 94 %   04/09/19 0724 98.8 °F (37.1 °C) 85 21 (!) 130/93 95 %   04/09/19 0316 98.5 °F (36.9 °C) 92 18 143/83 95 %   04/09/19 0048 99.5 °F (37.5 °C) 89 18 178/87 94 %   04/08/19 1949 99.3 °F (37.4 °C) 91 18 (!) 149/99 97 %   04/08/19 1604 98.7 °F (37.1 °C) 86 16 171/90 96 %        General:   General appearance: Pt is in no acute distress   Distal pulses are preserved  Fundoscopic exam: attempted    Neurological Examination:   Mental Status:  AAO x3. Speech is fluent. Follows commands, has normal fund of knowledge, attention, short term recall, comprehension and insight. Cranial Nerves: Visual fields are full. PERRL, Extraocular movements are full. Facial sensation intact. Facial movement intact. Hearing intact to conversation. Palate elevates symmetrically. Shoulder shrug symmetric. Tongue midline. Motor: Strength is 5/5 in all 4 ext. Normal tone. No atrophy. Sensation: Normal to light touch    Reflexes: DTRs trace throughout. Plantar responses downgoing. Coordination/Cerebellar: Intact to finger-nose-finger     Gait: deferred    Skin: No significant bruising or lacerations.     LAB DATA REVIEWED:    Recent Results (from the past 24 hour(s))   OCCULT BLOOD, STOOL    Collection Time: 04/08/19  1:34 PM   Result Value Ref Range    Occult blood, stool POSITIVE (A) NEG     METABOLIC PANEL, BASIC    Collection Time: 04/09/19  4:34 AM   Result Value Ref Range    Sodium 138 136 - 145 mmol/L    Potassium 3.5 3.5 - 5.1 mmol/L    Chloride 111 (H) 97 - 108 mmol/L    CO2 17 (L) 21 - 32 mmol/L    Anion gap 10 5 - 15 mmol/L    Glucose 83 65 - 100 mg/dL    BUN 11 6 - 20 MG/DL    Creatinine 0.41 (L) 0.55 - 1.02 MG/DL    BUN/Creatinine ratio 27 (H) 12 - 20      GFR est AA >60 >60 ml/min/1.73m2    GFR est non-AA >60 >60 ml/min/1.73m2    Calcium 7.4 (L) 8.5 - 10.1 MG/DL   CBC WITH AUTOMATED DIFF    Collection Time: 04/09/19  4:34 AM   Result Value Ref Range    WBC 14.1 (H) 3.6 - 11.0 K/uL    RBC 3.84 3.80 - 5.20 M/uL    HGB 13.4 11.5 - 16.0 g/dL    HCT 39.7 35.0 - 47.0 %    .4 (H) 80.0 - 99.0 FL    MCH 34.9 (H) 26.0 - 34.0 PG    MCHC 33.8 30.0 - 36.5 g/dL    RDW 12.7 11.5 - 14.5 %    PLATELET 125 (L) 112 - 400 K/uL    MPV 10.3 8.9 - 12.9 FL    NRBC 0.0 0  WBC    ABSOLUTE NRBC 0.00 0.00 - 0.01 K/uL    NEUTROPHILS 74 32 - 75 %    LYMPHOCYTES 15 12 - 49 %    MONOCYTES 10 5 - 13 %    EOSINOPHILS 1 0 - 7 %    BASOPHILS 0 0 - 1 %    IMMATURE GRANULOCYTES 0 0.0 - 0.5 %    ABS. NEUTROPHILS 10.3 (H) 1.8 - 8.0 K/UL    ABS. LYMPHOCYTES 2.2 0.8 - 3.5 K/UL    ABS. MONOCYTES 1.4 (H) 0.0 - 1.0 K/UL    ABS. EOSINOPHILS 0.2 0.0 - 0.4 K/UL    ABS. BASOPHILS 0.0 0.0 - 0.1 K/UL    ABS. IMM. GRANS. 0.1 (H) 0.00 - 0.04 K/UL    DF AUTOMATED          Imaging review:  2017  MRI brain  1. Normal evaluation of the orbits. 2. Unchanged appearance of the brain with enlarged right cerebral hemisphere and  lateral ventricle, and hypoplastic/dysplastic appearance of the left cerebral  hemisphere. No acute intracranial abnormality.       HOME MEDS  Prior to Admission Medications   Prescriptions Last Dose Informant Patient Reported? Taking? FLUoxetine (PROZAC) 20 mg capsule 4/7/2019 at Unknown time  Yes Yes   Sig: Take 20 mg by mouth nightly. mirabegron ER (MYRBETRIQ) 25 mg ER tablet 4/7/2019 at Unknown time  Yes Yes   Sig: Take 25 mg by mouth nightly. phenytoin ER (DILANTIN ER) 100 mg ER capsule 4/7/2019 at Unknown time  Yes Yes   Sig: Take 200 mg by mouth daily. phenytoin ER (DILANTIN ER) 100 mg ER capsule 4/7/2019 at Unknown time  Yes Yes   Sig: Take 300 mg by mouth nightly.  Take (3) 100 mg capsules along with a 30 mg capsule for a total dose of 330 mg at bedtime   phenytoin ER (DILANTIN ER) 30 mg ER capsule 4/7/2019 at Unknown time  Yes Yes   Sig: Take 30 mg by mouth nightly. Takes a 30 mg capsule along with (3) 100 mg capsules for a total dose of 330 mg at bedtime   topiramate ER (TROKENDI XR) 200 mg capsule 4/7/2019 at Unknown time  Yes Yes   Sig: Take 400 mg by mouth nightly. Facility-Administered Medications: None       CURRENT MEDS  Current Facility-Administered Medications   Medication Dose Route Frequency    levoFLOXacin (LEVAQUIN) 750 mg in D5W IVPB  750 mg IntraVENous Q24H    topiramate ER (TROKENDI XR) capsule 400 mg (Patient Supplied)  400 mg Oral QHS    phenytoin ER (DILANTIN ER) ER capsule 200 mg (Patient Supplied)  200 mg Oral DAILY    phenytoin ER (DILANTIN ER) ER capsule 330 mg (Patient Supplied)  330 mg Oral QHS    FLUoxetine (PROzac) capsule 20 mg  20 mg Oral DAILY    mirabegron ER (MYRBETRIQ) tablet 25 mg  25 mg Oral DAILY    sodium chloride (NS) flush 5-40 mL  5-40 mL IntraVENous Q8H    heparin (porcine) injection 5,000 Units  5,000 Units SubCUTAneous Q8H    0.9% sodium chloride infusion  100 mL/hr IntraVENous CONTINUOUS    vancomycin (FIRVANQ) 50 mg/mL oral solution 125 mg  125 mg Oral Q6H       IMPRESSION:  Ej Joyce is a 51 y.o. female. Neurology has been consulted for hx of seizures and supratherapeutic dilantin level. She is on dilantin 200 mg in am and 330 mg at night. Although her dilantin level is 25.8, no evidence of toxicity is present. Also, whenever her dosage has been decreased, she ends up having a seizure. Will continue with the present dosage with no change. RECOMMENDATIONS:  1. Cont dilantin 200 mg in am and 330 mg at night  2. Cont trokendi 400 mg at night    Call with questions. Thank you very much for this consultation.      Bibiana Perez MD  Neurologist

## 2019-04-10 ENCOUNTER — APPOINTMENT (OUTPATIENT)
Dept: GENERAL RADIOLOGY | Age: 49
DRG: 871 | End: 2019-04-10
Attending: PHYSICIAN ASSISTANT
Payer: MEDICARE

## 2019-04-10 ENCOUNTER — ANESTHESIA (OUTPATIENT)
Dept: ENDOSCOPY | Age: 49
DRG: 871 | End: 2019-04-10
Payer: MEDICARE

## 2019-04-10 ENCOUNTER — ANESTHESIA EVENT (OUTPATIENT)
Dept: ENDOSCOPY | Age: 49
DRG: 871 | End: 2019-04-10
Payer: MEDICARE

## 2019-04-10 LAB
BACTERIA SPEC CULT: NORMAL
C JEJUNI+C COLI AG STL QL: NEGATIVE
CC UR VC: NORMAL
E COLI SXT1+2 STL IA: NEGATIVE
SERVICE CMNT-IMP: NORMAL
SERVICE CMNT-IMP: NORMAL

## 2019-04-10 PROCEDURE — 74011250636 HC RX REV CODE- 250/636: Performed by: INTERNAL MEDICINE

## 2019-04-10 PROCEDURE — 74011250637 HC RX REV CODE- 250/637: Performed by: INTERNAL MEDICINE

## 2019-04-10 PROCEDURE — 74011250636 HC RX REV CODE- 250/636

## 2019-04-10 PROCEDURE — 0DBL8ZX EXCISION OF TRANSVERSE COLON, VIA NATURAL OR ARTIFICIAL OPENING ENDOSCOPIC, DIAGNOSTIC: ICD-10-PCS | Performed by: SPECIALIST

## 2019-04-10 PROCEDURE — 76060000032 HC ANESTHESIA 0.5 TO 1 HR: Performed by: SPECIALIST

## 2019-04-10 PROCEDURE — 94760 N-INVAS EAR/PLS OXIMETRY 1: CPT

## 2019-04-10 PROCEDURE — 74018 RADEX ABDOMEN 1 VIEW: CPT

## 2019-04-10 PROCEDURE — 76040000007: Performed by: SPECIALIST

## 2019-04-10 PROCEDURE — 88305 TISSUE EXAM BY PATHOLOGIST: CPT

## 2019-04-10 PROCEDURE — 74011250637 HC RX REV CODE- 250/637: Performed by: SPECIALIST

## 2019-04-10 PROCEDURE — 77030009426 HC FCPS BIOP ENDOSC BSC -B: Performed by: SPECIALIST

## 2019-04-10 PROCEDURE — 65660000000 HC RM CCU STEPDOWN

## 2019-04-10 RX ORDER — PROPOFOL 10 MG/ML
INJECTION, EMULSION INTRAVENOUS AS NEEDED
Status: DISCONTINUED | OUTPATIENT
Start: 2019-04-10 | End: 2019-04-10 | Stop reason: HOSPADM

## 2019-04-10 RX ORDER — VANCOMYCIN HYDROCHLORIDE 250 MG/5ML
125 POWDER, FOR SOLUTION ORAL EVERY 6 HOURS
Status: DISCONTINUED | OUTPATIENT
Start: 2019-04-10 | End: 2019-04-15 | Stop reason: HOSPADM

## 2019-04-10 RX ORDER — SODIUM CHLORIDE 0.9 % (FLUSH) 0.9 %
5-40 SYRINGE (ML) INJECTION EVERY 8 HOURS
Status: DISCONTINUED | OUTPATIENT
Start: 2019-04-10 | End: 2019-04-15 | Stop reason: HOSPADM

## 2019-04-10 RX ORDER — SODIUM CHLORIDE 0.9 % (FLUSH) 0.9 %
5-40 SYRINGE (ML) INJECTION AS NEEDED
Status: DISCONTINUED | OUTPATIENT
Start: 2019-04-10 | End: 2019-04-15 | Stop reason: HOSPADM

## 2019-04-10 RX ORDER — LIDOCAINE HYDROCHLORIDE 20 MG/ML
INJECTION, SOLUTION EPIDURAL; INFILTRATION; INTRACAUDAL; PERINEURAL AS NEEDED
Status: DISCONTINUED | OUTPATIENT
Start: 2019-04-10 | End: 2019-04-10 | Stop reason: HOSPADM

## 2019-04-10 RX ORDER — SODIUM CHLORIDE 9 MG/ML
INJECTION, SOLUTION INTRAVENOUS
Status: DISCONTINUED | OUTPATIENT
Start: 2019-04-10 | End: 2019-04-10 | Stop reason: HOSPADM

## 2019-04-10 RX ORDER — FENTANYL CITRATE 50 UG/ML
25 INJECTION, SOLUTION INTRAMUSCULAR; INTRAVENOUS
Status: DISCONTINUED | OUTPATIENT
Start: 2019-04-10 | End: 2019-04-10 | Stop reason: HOSPADM

## 2019-04-10 RX ORDER — MIDAZOLAM HYDROCHLORIDE 1 MG/ML
1-2 INJECTION, SOLUTION INTRAMUSCULAR; INTRAVENOUS
Status: DISCONTINUED | OUTPATIENT
Start: 2019-04-10 | End: 2019-04-10 | Stop reason: HOSPADM

## 2019-04-10 RX ADMIN — Medication 10 ML: at 05:42

## 2019-04-10 RX ADMIN — PROPOFOL 50 MG: 10 INJECTION, EMULSION INTRAVENOUS at 11:28

## 2019-04-10 RX ADMIN — LEVOFLOXACIN 750 MG: 5 INJECTION, SOLUTION INTRAVENOUS at 08:44

## 2019-04-10 RX ADMIN — PROPOFOL 50 MG: 10 INJECTION, EMULSION INTRAVENOUS at 11:21

## 2019-04-10 RX ADMIN — Medication 10 ML: at 15:05

## 2019-04-10 RX ADMIN — METRONIDAZOLE 500 MG: 500 INJECTION, SOLUTION INTRAVENOUS at 22:03

## 2019-04-10 RX ADMIN — METRONIDAZOLE 500 MG: 500 INJECTION, SOLUTION INTRAVENOUS at 15:00

## 2019-04-10 RX ADMIN — SODIUM CHLORIDE: 9 INJECTION, SOLUTION INTRAVENOUS at 11:07

## 2019-04-10 RX ADMIN — Medication 10 ML: at 22:04

## 2019-04-10 RX ADMIN — SODIUM CHLORIDE 100 ML/HR: 900 INJECTION, SOLUTION INTRAVENOUS at 14:50

## 2019-04-10 RX ADMIN — METRONIDAZOLE 500 MG: 500 INJECTION, SOLUTION INTRAVENOUS at 05:42

## 2019-04-10 RX ADMIN — OXYCODONE HYDROCHLORIDE 5 MG: 5 TABLET ORAL at 22:03

## 2019-04-10 RX ADMIN — PROPOFOL 50 MG: 10 INJECTION, EMULSION INTRAVENOUS at 11:16

## 2019-04-10 RX ADMIN — PROPOFOL 25 MG: 10 INJECTION, EMULSION INTRAVENOUS at 11:34

## 2019-04-10 RX ADMIN — HEPARIN SODIUM 5000 UNITS: 5000 INJECTION INTRAVENOUS; SUBCUTANEOUS at 18:24

## 2019-04-10 RX ADMIN — PHENYTOIN SODIUM 200 MG: 100 CAPSULE, EXTENDED RELEASE ORAL at 08:55

## 2019-04-10 RX ADMIN — VANCOMYCIN HYDROCHLORIDE 125 MG: KIT at 14:50

## 2019-04-10 RX ADMIN — PROPOFOL 25 MG: 10 INJECTION, EMULSION INTRAVENOUS at 11:38

## 2019-04-10 RX ADMIN — PROPOFOL 25 MG: 10 INJECTION, EMULSION INTRAVENOUS at 11:25

## 2019-04-10 RX ADMIN — LIDOCAINE HYDROCHLORIDE 40 MG: 20 INJECTION, SOLUTION EPIDURAL; INFILTRATION; INTRACAUDAL; PERINEURAL at 11:16

## 2019-04-10 RX ADMIN — VANCOMYCIN HYDROCHLORIDE 125 MG: KIT at 18:24

## 2019-04-10 RX ADMIN — PROPOFOL 25 MG: 10 INJECTION, EMULSION INTRAVENOUS at 11:24

## 2019-04-10 NOTE — PROGRESS NOTES
CRS Progress Note    Pain 3/10, better than yesterday. No nausea or vomiting. BP (!) 142/93 (BP 1 Location: Right arm, BP Patient Position: At rest)   Pulse 74   Temp 98.1 °F (36.7 °C)   Resp 18   Ht 5' 5\" (1.651 m)   Wt 108.6 kg (239 lb 6.4 oz)   SpO2 98%   Breastfeeding? No   BMI 39.84 kg/m²     NAD, AAOx3  Abd soft, minimally tender, no rebound/guarding    WBC 14 (from 16)    Plan  Agree with treating for C diff based on colonoscopy findings  No need for surgery at this time.

## 2019-04-10 NOTE — ANESTHESIA POSTPROCEDURE EVALUATION
Procedure(s):  COLONOSCOPY  COLON BIOPSY. total IV anesthesia, MAC    Anesthesia Post Evaluation        Patient location during evaluation: PACU  Note status: Adequate. Level of consciousness: responsive to verbal stimuli and sleepy but conscious  Pain management: satisfactory to patient  Airway patency: patent  Anesthetic complications: no  Cardiovascular status: acceptable  Respiratory status: acceptable  Hydration status: acceptable  Comments: +Post-Anesthesia Evaluation and Assessment    Patient: Isidra Rios MRN: [id number]  SSN: [id number]   YOB: 1970  Age: 51 y.o. Sex: female      Cardiovascular Function/Vital Signs    BP (!) 175/97   Pulse 75   Temp 36.6 °C (97.9 °F)   Resp 20   Ht 5' 5\" (1.651 m)   Wt 108.6 kg (239 lb 6.4 oz)   SpO2 99%   Breastfeeding? No   BMI 39.84 kg/m²     Patient is status post Procedure(s):  COLONOSCOPY  COLON BIOPSY. Nausea/Vomiting: Controlled. Postoperative hydration reviewed and adequate. Pain:  Pain Scale 1: Numeric (0 - 10) (04/10/19 1200)  Pain Intensity 1: 0 (04/10/19 1200)   Managed. Neurological Status: At baseline. Mental Status and Level of Consciousness: Arousable. Pulmonary Status:   O2 Device: Room air (04/10/19 1200)   Adequate oxygenation and airway patent. Complications related to anesthesia: None    Post-anesthesia assessment completed. No concerns.     Signed By: Lesly Hyde MD    4/10/2019  Post anesthesia nausea and vomiting:  controlled      Vitals Value Taken Time   /97 4/10/2019 12:00 PM   Temp     Pulse 75 4/10/2019 12:00 PM   Resp 20 4/10/2019 12:00 PM   SpO2 99 % 4/10/2019 12:00 PM

## 2019-04-10 NOTE — PROGRESS NOTES
Infectious Disease Progress Note      IMPRESSION:   · Sepsis   · Fevers with vomiting , diarrhea, previously treated for UTI & thereafter B/L ear infecions  · Colitis involving  transverse colon on CT , diarrhea with blood + ,  C.diff negative  · MANUEL  Pneumonia on CXR   · Currently no evidence of UTI , persisting  ear symptoms +  · CT ear - minimal fluid R/ mastoid tip air cells, no other fluid in middle ear   · H/o recurrent B/l ear infections since childhood , plan was for tube placement by ENT  · H/o recurrent UTI since childhood , worse over past 3 years  · Congenitalstatic encephalopathy & seizure disorder         PLAN:      · Dc po Vancomycin, add Flagyl IV for colitis  / Levaquin IV  10-14 days , may transition to po when clinical improvement seen  · Urology office notes reviewed-h/o  recurrent UTI , chronic irritative voiding symptoms,mixed incontinence,incomplete bladder emptying . Pt was on Macrobid po, last office note was for er to stop , start on Hiprex, Azo as needed. · Plan of care D/w pt's mom     CT tympanic bones  1. Minimal fluid right mastoid tip air cells. 2. Slight asymmetric prominence left vestibular aqueduct which is a  developmental variation. 3. No acute abnormality demonstrated.       Subjective:     Pt seen . Resting , appears more comfortable today . Afebrile Mom at [place]side    Review of Systems:  A comprehensive review of systems was negative except for that written in the History of Present Illness. 10 point ROS obtained . All other systems negative . Objective:     Blood pressure (!) 150/95, pulse 84, temperature 97.8 °F (36.6 °C), resp. rate 16, height 5' 5\" (1.651 m), weight 235 lb 14.3 oz (107 kg), SpO2 98 %.   Temp (24hrs), Av.3 °F (36.8 °C), Min:97.8 °F (36.6 °C), Max:98.8 °F (37.1 °C)      Patient Vitals for the past 24 hrs:   Temp Pulse Resp BP SpO2   19 2303 97.8 °F (36.6 °C) 84 16 (!) 150/95 98 %   19 1907 98.3 °F (36.8 °C) 84 18 146/85 93 %   19 1555 98.6 °F (37 °C) 88 18 (!) 150/97 92 %   04/09/19 1150 98.2 °F (36.8 °C) 75 18 122/89 94 %   04/09/19 0724 98.8 °F (37.1 °C) 85 21 (!) 130/93 95 %         Lines:  Peripheral IV:       Physical Exam:   General:  Alert, cooperative, well developed, appears stated age   Eyes:  Sclera anicteric. Pupils equally round and reactive to light. Mouth/Throat: Mucous membranes normal, oral pharynx clear   Neck: Supple   Lungs:   Clear to auscultation bilaterally, good effort   CV:  Regular rate and rhythm,no murmur, click, rub or gallop   Abdomen:   Soft, non-tender.  bowel sounds normal. non-distended   Extremities: No cyanosis or edema   Skin: Skin color, texture, turgor normal. no acute rash or lesions   Lymph nodes: Cervical and supraclavicular normal   Musculoskeletal: No swelling or deformity   Lines/Devices:  Intact, no erythema, drainage or tenderness   Psych: Alert and oriented, normal mood affect       Data Review:   CBC:   Recent Labs     04/09/19  0434 04/08/19  0545 04/07/19 2051   WBC 14.1* 16.7* 23.0*   RBC 3.84 3.98 4.85   HGB 13.4 13.9 17.0*   HCT 39.7 40.0 46.8   * 138* 160   GRANS 74 79* 81*   LYMPH 15 11* 5*   EOS 1 0 1     CMP:   Recent Labs     04/09/19  0434 04/08/19  0545 04/07/19 2051   GLU 83 118* 147*    140 136   K 3.5 3.6 3.0*   * 112* 105   CO2 17* 20* 23   BUN 11 17 21*   CREA 0.41* 0.55 0.76   CA 7.4* 7.2* 8.2*   AGAP 10 8 8   BUCR 27* 31* 28*   AP  --   --  82   TP  --   --  7.1   ALB  --   --  3.3*   GLOB  --   --  3.8   AGRAT  --   --  0.9*       Studies:      Lab Results   Component Value Date/Time    Culture result:  04/08/2019 07:33 AM     NO ROUTINE ENTERIC PATHOGENS ISOLATED INCLUDING SALMONELLA, SHIGELLA, YERSINIA, VIBRIO OR SHIGA TOXIN PRODUCING E. COLI SO FAR    Culture result: MARKEDLY REDUCED COLIFORMS NOTED 04/08/2019 07:33 AM    Culture result: NO GROWTH 2 DAYS 04/07/2019 08:51 PM    Culture result: NO GROWTH 2 DAYS 04/07/2019 08:51 PM        XR Results (most recent):  Results from [place] encounter on 04/07/19   XR ABD (KUB)    Narrative INDICATION: fu colitis     EXAM: Abdomen single view KUB. COMPARISON: CT Abdomen Pelvis 4/7/2019    FINDINGS: Portable supine KUB at 0759 hours shows increased gas distention of  the right and transverse colon, currently 6 cm, previously 4 cm. There is no  overt small bowel dilation. Impression IMPRESSION: Increased colonic distention. Patient Active Problem List   Diagnosis Code    Seizure ([facility] [address] R56.9    Chronic migraine G43.709    Mental developmental delay F81.9    Severe obesity (BMI 35.0-39. 9) E66.01    Sepsis ([facility] [address] A41.9    Diarrhea R19.7    Colitis K52.9    NSAID long-term use Z79.1    Blood in stool K92.1         ICD-10-CM ICD-9-CM    1. Acute cystitis without hematuria N30.00 595.0    2. Noninfectious gastroenteritis, unspecified type K52.9 558.9    3. Hx of idiopathic seizure Z87.898 V12.49        I have discussed the diagnosis with the patient and the intended plan as seen in the above orders. I have discussed medication side effects and warnings with the patient as well. Reviewed test results at length with patient    Anti-infectives:    Add Flagyl IV - 4/9  Levaquin po/ IV - 4/7  Vancomycin po -4/7-4/9- DC     Francis Rolon MD FACP

## 2019-04-10 NOTE — PROGRESS NOTES
Anesthesia reports 250 mg Propofol, 40 mg Lidocaine and 250 mL NS given during procedure. Received report from anesthesia staff on vital signs and status of patient.

## 2019-04-10 NOTE — PROGRESS NOTES
TRANSFER - OUT REPORT:    Verbal report given to MARY Kim (name) on Janna Ashley  being transferred to room 3600 [id number] (unit) for routine progression of care       Report consisted of patients Situation, Background, Assessment and   Recommendations(SBAR). Information from the following report(s) Procedure Summary, Intake/Output, MAR and Recent Results was reviewed with the receiving nurse. Lines:   Peripheral IV 04/07/19 Right Antecubital (Active)   Site Assessment Clean, dry, & intact 4/10/2019  8:00 AM   Phlebitis Assessment 0 4/10/2019  8:00 AM   Infiltration Assessment 0 4/10/2019  8:00 AM   Dressing Status Clean, dry, & intact 4/10/2019  8:00 AM   Dressing Type Transparent 4/10/2019  8:00 AM   Hub Color/Line Status Infusing 4/10/2019  8:00 AM   Alcohol Cap Used Yes 4/10/2019  2:00 AM       Peripheral IV 04/10/19 Left (Active)        Opportunity for questions and clarification was provided. Patient transported with:  Author of this note and [employer], endo tech.

## 2019-04-10 NOTE — ANESTHESIA PREPROCEDURE EVALUATION
Relevant Problems   No relevant active problems       Anesthetic History   No history of anesthetic complications            Review of Systems / Medical History  Patient summary reviewed, nursing notes reviewed and pertinent labs reviewed    Pulmonary  Within defined limits                 Neuro/Psych     seizures: well controlled    Headaches     Cardiovascular  Within defined limits                Exercise tolerance: >4 METS     GI/Hepatic/Renal  Within defined limits              Endo/Other        Morbid obesity     Other Findings   Comments: Mental developmental delay           Physical Exam    Airway  Mallampati: II  TM Distance: 4 - 6 cm  Neck ROM: normal range of motion   Mouth opening: Normal     Cardiovascular  Regular rate and rhythm,  S1 and S2 normal,  no murmur, click, rub, or gallop             Dental  No notable dental hx       Pulmonary  Breath sounds clear to auscultation               Abdominal  GI exam deferred       Other Findings            Anesthetic Plan    ASA: 3  Anesthesia type: total IV anesthesia and MAC          Induction: Intravenous  Anesthetic plan and risks discussed with: Patient

## 2019-04-10 NOTE — PROGRESS NOTES
GI addendum:    Colonoscopy report,findings and pictures reviewed with Dr Aury Christine. C/w Pseudomembranous colitis. Will restart PO Vancomycin and continue IV Flagyl. ID to decide if Levaquin can be stopped. Contact precautions. Liquids only.     SMA Colin MD

## 2019-04-10 NOTE — PROGRESS NOTES
0700: Bedside shift change report given to MARY Hudson (oncoming nurse) by Paris Pires (offgoing nurse). Report included the following information SBAR, Kardex, Procedure Summary, Intake/Output, MAR and Recent Results. *Colonoscopy scheduled for 11am today. Pts. Mother was asking if the pt. Could have her seizures medications. PT is NPO but asked mother to inquire with MD during rounds.

## 2019-04-10 NOTE — PROCEDURES
Colonoscopy Procedure Note    Indications: Severe diarrhea with focal colitis in transverse colon    Referring Physician: Davis Young MD  Anesthesia/Sedation: MAC anesthesia Propofol  Endoscopist:  Dr. Frances Key    Procedure in Detail:  Informed consent was obtained for the procedure, including sedation. Risks of perforation, hemorrhage, adverse drug reaction, and aspiration were discussed. The patient was placed in the left lateral decubitus position. Based on the pre-procedure assessment, including review of the patient's medical history, medications, allergies, and review of systems, she had been deemed to be an appropriate candidate for moderate sedation; she was therefore sedated with the medications listed above. The patient was monitored continuously with ECG tracing, pulse oximetry, blood pressure monitoring, and direct observations. A rectal examination was performed. The [id number] was inserted into the rectum and advanced under direct vision to the cecum, which was identified by the ileocecal valve and appendiceal orifice. The quality of the colonic preparation was adequate. A careful inspection was made as the colonoscope was withdrawn, including a retroflexed view of the rectum; findings and interventions are described below. Appropriate photodocumentation was obtained. Findings: There was diffuse yellowish adherent exudate throughout the entire transverse colon c/w PSEUDOMEMBRANOUS COLITIS. The mucosa in the rectum, sigmoid, descending and ascending colon was normal.  The transverse colon was mildly dilated but upon lavaging the exudate we saw some mucosal ulceration that was more superficial and c/w Pseudomembranous, nonischemic appearing colitis. No polyps seen. Scope advanced to the cecum. S/P Bxs of the transverse colon. Therapies:  As above    Specimen: Specimens were collected as described above and sent to pathology.      Complications: None were encountered during the procedure. EBL: < 50 ml. Recommendations:   -would resume PO Vancomycin given appearance on colonoscopy of pseudomembranous colitis.   -clear liquid diet  -contact precautions  -findings d/w Dr. Jenae Walker who will resume pt care    Signed By: Governor MD Hay                        April 10, 2019

## 2019-04-10 NOTE — PROGRESS NOTES
KUB done prior to colonoscopy showed transverse colon dilation up to 8cm (from 6cm yesterday). Discussed with Dr. Mark Antonio and Dr. Raffaele Garza. Some air was suctioned out of the colon during the colonoscopy. We will repeat KUB and keep patient NPO. CRS is following. Oral vancomycin was restarted due to pseudomembranous colitis noted on colonoscopy c/w C. Diff. Dr. Raffaele Garza discussed stopping Levaquin with ID.     Elray Curling, PA  04/10/19  1:42 PM

## 2019-04-10 NOTE — PROGRESS NOTES
GI note:  I spoke with her mother over the phone. Mica Harris was able to take the prep, almost all of it. Good BMs, clear now. No fevers overnight  Colonoscopy is planned with Dr Ravindra Tilley later today. I could not accommodate the procedure because of a full schedule. She is aware and appreciative of getting it done today.     SMA Colin MD

## 2019-04-10 NOTE — PROGRESS NOTES
Hospitalist Progress Note    NAME: Debbra Dakin   :  1970   MRN:  [id number]       Assessment / Plan:  Severe sepsis in the setting of acute colitis and UTI  Acute colitis, concerning for Cdiff infection given multiple abx exposure  PNA  -CXR showed PNA  -CT a/p showed colitis in the transverse colon. KUB done prior to colonoscopy showed transverse colon dilation up to 8cm   -stool cx and cdiff neg but Cscope on 4/10 with pseudomembranous colitis  -Pre-starting PO vancomycin. Will cont' with IV flagyl  -on CLD   -levaquin discontinued  -oxycodone prn/IV dilaudid prn  -monitor CBC  -GI/CRS/ID consultation appreciated    Hx of recurrent UTI   -UA suggestive of UTI, but ucx neg  -pt recently completed 2 rounds of agumentin and 1 round of ceftin prior to admission per pt's mother  -she takes prophylactic macrobid daily for recurrent UTI, prescribed by her urologist    History of seizure disorder  -seizure precaution, chronic elevation of phenytoin level, appreciate neuro's consultation   -resume PTA seizure meds    Hypokalemia  -repleted, WNL today    History of chronic hydrocephalus  -supportive care. Pt is independent of ADLs    Obesity- Body mass index is 39.84 kg/m². Code status: Full  Prophylaxis: Hep SQ  Recommended Disposition:  PT, OT, RN     Subjective:     Chief Complaint / Reason for Physician Visit  Pt seen post procedure. No complaints. Discussed with RN events overnight. Review of Systems:  Symptom Y/N Comments  Symptom Y/N Comments   Fever/Chills n   Chest Pain     Poor Appetite    Edema     Cough    Abdominal Pain y mild   Sputum    Joint Pain     SOB/GOLDEN n   Pruritis/Rash     Nausea/vomit    Tolerating PT/OT     Diarrhea y   Tolerating Diet     Constipation    Other       Could NOT obtain due to:      Objective:     VITALS:   Last 24hrs VS reviewed since prior progress note.  Most recent are:  Patient Vitals for the past 24 hrs:   Temp Pulse Resp BP SpO2   04/10/19 1200 -- 75 20 (!) 175/97 99 %   04/10/19 1155 -- 77 21 (!) 189/100 98 %   04/10/19 1150 -- 78 21 (!) 182/106 99 %   04/10/19 1145 -- 80 20 (!) 176/109 100 %   04/10/19 1053 97.9 °F (36.6 °C) 85 20 168/89 98 %   04/10/19 0736 98 °F (36.7 °C) 75 17 151/80 97 %   04/10/19 0706 -- -- -- (!) 154/94 --   04/10/19 0533 97.6 °F (36.4 °C) 78 16 170/85 98 %   04/09/19 2303 97.8 °F (36.6 °C) 84 16 (!) 150/95 98 %   04/09/19 1907 98.3 °F (36.8 °C) 84 18 146/85 93 %   04/09/19 1555 98.6 °F (37 °C) 88 18 (!) 150/97 92 %       Intake/Output Summary (Last 24 hours) at 4/10/2019 1528  Last data filed at 4/10/2019 1148  Gross per 24 hour   Intake 3936.67 ml   Output --   Net 3936.67 ml        PHYSICAL EXAM:  General: WD, WN. Alert, NAD  EENT:  EOMI. Anicteric sclerae. MMM  Resp:  CTA bilaterally, no wheezing or rales. No accessory muscle use  CV:  Regular  rhythm,  No edema  GI:  Soft, Non distended, Non tender.  +Bowel sounds  Neurologic:  Alert and oriented X 3, normal speech  Psych:   Fair insight. Not anxious nor agitated  Skin:  No rashes. No jaundice    Reviewed most current lab test results and cultures  YES  Reviewed most current radiology test results   YES  Review and summation of old records today    NO  Reviewed patient's current orders and MAR    YES  PMH/SH reviewed - no change compared to H&P  ________________________________________________________________________  Care Plan discussed with:    Comments   Patient x    Family  x Pt's mom   RN x    Care Manager     Consultant  x GI/ID                     Multidiciplinary team rounds were held today with , nursing, pharmacist and clinical coordinator. Patient's plan of care was discussed; medications were reviewed and discharge planning was addressed.      ________________________________________________________________________  Total NON critical care TIME:  35   Minutes    Total CRITICAL CARE TIME Spent:   Minutes non procedure based      Comments   >50% of visit spent in counseling and coordination of care     ________________________________________________________________________  Alexa Keller MD     Procedures: see electronic medical records for all procedures/Xrays and details which were not copied into this note but were reviewed prior to creation of Plan. LABS:  I reviewed today's most current labs and imaging studies.   Pertinent labs include:  Recent Labs     04/09/19  0434 04/08/19  0545 04/07/19 2051   WBC 14.1* 16.7* 23.0*   HGB 13.4 13.9 17.0*   HCT 39.7 40.0 46.8   * 138* 160     Recent Labs     04/09/19  0434 04/08/19  0545 04/07/19 2051    140 136   K 3.5 3.6 3.0*   * 112* 105   CO2 17* 20* 23   GLU 83 118* 147*   BUN 11 17 21*   CREA 0.41* 0.55 0.76   CA 7.4* 7.2* 8.2*   ALB  --   --  3.3*   TBILI  --   --  0.7   SGOT  --   --  43*   ALT  --   --  39       Signed: Alexa Keller MD

## 2019-04-10 NOTE — PROGRESS NOTES
Bedside and Verbal shift change report given to [facility] (oncoming nurse) by Willard Tamayo RN (offgoing nurse). Report included the following information SBAR, Kardex, Intake/Output, MAR and Recent Results.

## 2019-04-11 LAB
ANION GAP SERPL CALC-SCNC: 7 MMOL/L (ref 5–15)
BASOPHILS # BLD: 0.1 K/UL (ref 0–0.1)
BASOPHILS NFR BLD: 1 % (ref 0–1)
BUN SERPL-MCNC: 5 MG/DL (ref 6–20)
BUN/CREAT SERPL: 13 (ref 12–20)
CALCIUM SERPL-MCNC: 7.5 MG/DL (ref 8.5–10.1)
CHLORIDE SERPL-SCNC: 115 MMOL/L (ref 97–108)
CO2 SERPL-SCNC: 19 MMOL/L (ref 21–32)
CREAT SERPL-MCNC: 0.38 MG/DL (ref 0.55–1.02)
DIFFERENTIAL METHOD BLD: ABNORMAL
EOSINOPHIL # BLD: 0.3 K/UL (ref 0–0.4)
EOSINOPHIL NFR BLD: 4 % (ref 0–7)
ERYTHROCYTE [DISTWIDTH] IN BLOOD BY AUTOMATED COUNT: 12.7 % (ref 11.5–14.5)
GLUCOSE SERPL-MCNC: 84 MG/DL (ref 65–100)
HCT VFR BLD AUTO: 37.1 % (ref 35–47)
HGB BLD-MCNC: 13.2 G/DL (ref 11.5–16)
IMM GRANULOCYTES # BLD AUTO: 0.1 K/UL (ref 0–0.04)
IMM GRANULOCYTES NFR BLD AUTO: 1 % (ref 0–0.5)
LYMPHOCYTES # BLD: 2 K/UL (ref 0.8–3.5)
LYMPHOCYTES NFR BLD: 30 % (ref 12–49)
MCH RBC QN AUTO: 36 PG (ref 26–34)
MCHC RBC AUTO-ENTMCNC: 35.6 G/DL (ref 30–36.5)
MCV RBC AUTO: 101.1 FL (ref 80–99)
MONOCYTES # BLD: 0.7 K/UL (ref 0–1)
MONOCYTES NFR BLD: 10 % (ref 5–13)
NEUTS SEG # BLD: 3.6 K/UL (ref 1.8–8)
NEUTS SEG NFR BLD: 54 % (ref 32–75)
NRBC # BLD: 0 K/UL (ref 0–0.01)
NRBC BLD-RTO: 0 PER 100 WBC
O+P SPEC MICRO: NORMAL
O+P STL CONC: NORMAL
PLATELET # BLD AUTO: 213 K/UL (ref 150–400)
PMV BLD AUTO: 10.8 FL (ref 8.9–12.9)
POTASSIUM SERPL-SCNC: 3 MMOL/L (ref 3.5–5.1)
RBC # BLD AUTO: 3.67 M/UL (ref 3.8–5.2)
SODIUM SERPL-SCNC: 141 MMOL/L (ref 136–145)
SPECIMEN SOURCE: NORMAL
WBC # BLD AUTO: 6.6 K/UL (ref 3.6–11)

## 2019-04-11 PROCEDURE — 36415 COLL VENOUS BLD VENIPUNCTURE: CPT

## 2019-04-11 PROCEDURE — 94760 N-INVAS EAR/PLS OXIMETRY 1: CPT

## 2019-04-11 PROCEDURE — 65660000000 HC RM CCU STEPDOWN

## 2019-04-11 PROCEDURE — 85025 COMPLETE CBC W/AUTO DIFF WBC: CPT

## 2019-04-11 PROCEDURE — 80048 BASIC METABOLIC PNL TOTAL CA: CPT

## 2019-04-11 PROCEDURE — 74011250637 HC RX REV CODE- 250/637: Performed by: SPECIALIST

## 2019-04-11 PROCEDURE — 74011250637 HC RX REV CODE- 250/637: Performed by: INTERNAL MEDICINE

## 2019-04-11 PROCEDURE — 74011250636 HC RX REV CODE- 250/636: Performed by: INTERNAL MEDICINE

## 2019-04-11 RX ORDER — POTASSIUM CHLORIDE 20 MEQ/1
40 TABLET, EXTENDED RELEASE ORAL 2 TIMES DAILY
Status: COMPLETED | OUTPATIENT
Start: 2019-04-11 | End: 2019-04-11

## 2019-04-11 RX ADMIN — VANCOMYCIN HYDROCHLORIDE 125 MG: KIT at 12:11

## 2019-04-11 RX ADMIN — VANCOMYCIN HYDROCHLORIDE 125 MG: KIT at 23:13

## 2019-04-11 RX ADMIN — Medication 10 ML: at 22:33

## 2019-04-11 RX ADMIN — SODIUM CHLORIDE 100 ML/HR: 900 INJECTION, SOLUTION INTRAVENOUS at 20:01

## 2019-04-11 RX ADMIN — PHENYTOIN SODIUM 200 MG: 100 CAPSULE, EXTENDED RELEASE ORAL at 09:23

## 2019-04-11 RX ADMIN — Medication 10 ML: at 14:00

## 2019-04-11 RX ADMIN — VANCOMYCIN HYDROCHLORIDE 125 MG: KIT at 01:24

## 2019-04-11 RX ADMIN — MIRABEGRON 25 MG: 25 TABLET, FILM COATED, EXTENDED RELEASE ORAL at 09:32

## 2019-04-11 RX ADMIN — HEPARIN SODIUM 5000 UNITS: 5000 INJECTION INTRAVENOUS; SUBCUTANEOUS at 23:12

## 2019-04-11 RX ADMIN — HEPARIN SODIUM 5000 UNITS: 5000 INJECTION INTRAVENOUS; SUBCUTANEOUS at 09:21

## 2019-04-11 RX ADMIN — VANCOMYCIN HYDROCHLORIDE 125 MG: KIT at 05:48

## 2019-04-11 RX ADMIN — HEPARIN SODIUM 5000 UNITS: 5000 INJECTION INTRAVENOUS; SUBCUTANEOUS at 01:23

## 2019-04-11 RX ADMIN — METRONIDAZOLE 500 MG: 500 INJECTION, SOLUTION INTRAVENOUS at 13:55

## 2019-04-11 RX ADMIN — METRONIDAZOLE 500 MG: 500 INJECTION, SOLUTION INTRAVENOUS at 05:40

## 2019-04-11 RX ADMIN — POTASSIUM CHLORIDE 40 MEQ: 20 TABLET, EXTENDED RELEASE ORAL at 17:59

## 2019-04-11 RX ADMIN — SODIUM CHLORIDE 100 ML/HR: 900 INJECTION, SOLUTION INTRAVENOUS at 01:59

## 2019-04-11 RX ADMIN — VANCOMYCIN HYDROCHLORIDE 125 MG: KIT at 18:08

## 2019-04-11 RX ADMIN — POTASSIUM CHLORIDE 40 MEQ: 20 TABLET, EXTENDED RELEASE ORAL at 13:00

## 2019-04-11 RX ADMIN — HEPARIN SODIUM 5000 UNITS: 5000 INJECTION INTRAVENOUS; SUBCUTANEOUS at 16:00

## 2019-04-11 RX ADMIN — Medication 10 ML: at 05:41

## 2019-04-11 RX ADMIN — FLUOXETINE HYDROCHLORIDE 20 MG: 20 CAPSULE ORAL at 09:21

## 2019-04-11 RX ADMIN — METRONIDAZOLE 500 MG: 500 INJECTION, SOLUTION INTRAVENOUS at 22:32

## 2019-04-11 NOTE — PROGRESS NOTES
Gastroenterology Progress Note    4/11/2019    Admit Date: 4/7/2019    Subjective: Follow up for:  1)  Colitis likely c diff related    2) Dilated transverse colon      Patient complains of lesser abdominal pain   She has had liquid bowel movements   WBC is normal.      Current Facility-Administered Medications   Medication Dose Route Frequency    sodium chloride (NS) flush 5-40 mL  5-40 mL IntraVENous Q8H    sodium chloride (NS) flush 5-40 mL  5-40 mL IntraVENous PRN    vancomycin (FIRVANQ) 50 mg/mL oral solution 125 mg  125 mg Oral Q6H    metroNIDAZOLE (FLAGYL) IVPB premix 500 mg  500 mg IntraVENous Q8H    topiramate ER (TROKENDI XR) capsule 400 mg (Patient Supplied)  400 mg Oral QHS    phenytoin ER (DILANTIN ER) ER capsule 200 mg (Patient Supplied)  200 mg Oral DAILY    phenytoin ER (DILANTIN ER) ER capsule 330 mg (Patient Supplied)  330 mg Oral QHS    oxyCODONE IR (ROXICODONE) tablet 5 mg  5 mg Oral Q6H PRN    HYDROmorphone (PF) (DILAUDID) injection 1 mg  1 mg IntraVENous Q6H PRN    sodium chloride (NS) flush 5-10 mL  5-10 mL IntraVENous PRN    butalbital-acetaminophen-caffeine (FIORICET, ESGIC) -40 mg per tablet 1 Tab  1 Tab Oral Q6H PRN    FLUoxetine (PROzac) capsule 20 mg  20 mg Oral DAILY    mirabegron ER (MYRBETRIQ) tablet 25 mg  25 mg Oral DAILY    sodium chloride (NS) flush 5-40 mL  5-40 mL IntraVENous Q8H    sodium chloride (NS) flush 5-40 mL  5-40 mL IntraVENous PRN    acetaminophen (TYLENOL) tablet 650 mg  650 mg Oral Q6H PRN    ondansetron (ZOFRAN) injection 4 mg  4 mg IntraVENous Q6H PRN    docusate sodium (COLACE) capsule 100 mg  100 mg Oral DAILY PRN    heparin (porcine) injection 5,000 Units  5,000 Units SubCUTAneous Q8H    0.9% sodium chloride infusion  100 mL/hr IntraVENous CONTINUOUS        Objective:     Blood pressure (!) 175/106, pulse 82, temperature 98.9 °F (37.2 °C), resp.  rate 17, height 5' 5\" (1.651 m), weight 108.6 kg (239 lb 6.4 oz), SpO2 98 %, not currently breastfeeding. No intake/output data recorded. 04/09 1901 - 04/11 0700  In: 4276.7 [P.O.:3205; I.V.:1071.7]  Out: 250 [Urine:250]    EXAM:   Gen: pleasant obese WFin NAD. More alert. HEENT: NCAT. Lungs: CTA B. Heart: RRR. No M/R/G  Abd: Minimal pain in epigastric area. .  No guarding or rebounding. Ext: No pitting edema. Data Review    Recent Results (from the past 24 hour(s))   METABOLIC PANEL, BASIC    Collection Time: 04/11/19  1:37 AM   Result Value Ref Range    Sodium 141 136 - 145 mmol/L    Potassium 3.0 (L) 3.5 - 5.1 mmol/L    Chloride 115 (H) 97 - 108 mmol/L    CO2 19 (L) 21 - 32 mmol/L    Anion gap 7 5 - 15 mmol/L    Glucose 84 65 - 100 mg/dL    BUN 5 (L) 6 - 20 MG/DL    Creatinine 0.38 (L) 0.55 - 1.02 MG/DL    BUN/Creatinine ratio 13 12 - 20      GFR est AA >60 >60 ml/min/1.73m2    GFR est non-AA >60 >60 ml/min/1.73m2    Calcium 7.5 (L) 8.5 - 10.1 MG/DL   CBC WITH AUTOMATED DIFF    Collection Time: 04/11/19  1:37 AM   Result Value Ref Range    WBC 6.6 3.6 - 11.0 K/uL    RBC 3.67 (L) 3.80 - 5.20 M/uL    HGB 13.2 11.5 - 16.0 g/dL    HCT 37.1 35.0 - 47.0 %    .1 (H) 80.0 - 99.0 FL    MCH 36.0 (H) 26.0 - 34.0 PG    MCHC 35.6 30.0 - 36.5 g/dL    RDW 12.7 11.5 - 14.5 %    PLATELET 561 926 - 179 K/uL    MPV 10.8 8.9 - 12.9 FL    NRBC 0.0 0  WBC    ABSOLUTE NRBC 0.00 0.00 - 0.01 K/uL    NEUTROPHILS 54 32 - 75 %    LYMPHOCYTES 30 12 - 49 %    MONOCYTES 10 5 - 13 %    EOSINOPHILS 4 0 - 7 %    BASOPHILS 1 0 - 1 %    IMMATURE GRANULOCYTES 1 (H) 0.0 - 0.5 %    ABS. NEUTROPHILS 3.6 1.8 - 8.0 K/UL    ABS. LYMPHOCYTES 2.0 0.8 - 3.5 K/UL    ABS. MONOCYTES 0.7 0.0 - 1.0 K/UL    ABS. EOSINOPHILS 0.3 0.0 - 0.4 K/UL    ABS. BASOPHILS 0.1 0.0 - 0.1 K/UL    ABS. IMM.  GRANS. 0.1 (H) 0.00 - 0.04 K/UL    DF AUTOMATED       Recent Labs     04/11/19 0137 04/09/19  0434   WBC 6.6 14.1*   HGB 13.2 13.4   HCT 37.1 39.7    129*     Recent Labs     04/11/19 0137 04/09/19  0434    138   K 3.0* 3.5   * 111*   CO2 19* 17*   BUN 5* 11   CREA 0.38* 0.41*   GLU 84 83   CA 7.5* 7.4*     No results for input(s): SGOT, GPT, ALT, AP, TBIL, TBILI, TP, ALB, GLOB, GGT, AML, LPSE in the last 72 hours. No lab exists for component: AMYP, HLPSE  No results for input(s): INR, PTP, APTT in the last 72 hours. No lab exists for component: INREXT, INREXT   No results for input(s): FE, TIBC, PSAT, FERR in the last 72 hours. No results found for: FOL, RBCF   No results for input(s): PH, PCO2, PO2 in the last 72 hours. No results for input(s): CPK, CKNDX, TROIQ in the last 72 hours. No lab exists for component: CPKMB  No results found for: CHOL, CHOLX, CHLST, CHOLV, HDL, LDL, LDLC, DLDLP, TGLX, TRIGL, TRIGP, CHHD, CHHDX  No results found for: [facility]  Lab Results   Component Value Date/Time    Color BAUTISTA 04/07/2019 08:53 PM    Appearance CLOUDY (A) 04/07/2019 08:53 PM    Specific gravity 1.030 04/07/2019 08:53 PM    pH (UA) 5.5 04/07/2019 08:53 PM    Protein 30 (A) 04/07/2019 08:53 PM    Glucose NEGATIVE  04/07/2019 08:53 PM    Ketone TRACE (A) 04/07/2019 08:53 PM    Urobilinogen 0.2 04/07/2019 08:53 PM    Nitrites POSITIVE (A) 04/07/2019 08:53 PM    Leukocyte Esterase SMALL (A) 04/07/2019 08:53 PM    Epithelial cells FEW 04/07/2019 08:53 PM    Bacteria NEGATIVE  04/07/2019 08:53 PM    WBC 0-4 04/07/2019 08:53 PM    RBC 0-5 04/07/2019 08:53 PM           Assessment:     Active Problems:    Sepsis ([facility] [address] (4/7/2019)      Diarrhea (4/8/2019)      Colitis (4/8/2019)      NSAID long-term use (4/8/2019)      Blood in stool (4/8/2019)        Plan:     On PO Vancomycin and IV Flagyl. IV Levaquin was stopped after d/w ID. Will advance diet to full lqiuid. KUB post colonoscopy looked much better. We await bx from colonoscopy. Michelle Perez MD

## 2019-04-11 NOTE — PROGRESS NOTES
Reason for Admission:   Nausea, vomiting, T 102, several UTI since January, and bilateral ear infection. RRAT Score:          13           Plan for utilizing home health: May benefit                    Current Advanced Directive/Advance Care Plan: no    Likelihood of Readmission:  moderate                         Transition of Care Plan:                  Pt lives with mother in [place], [place], has a cane, grab bars, lift chair, wheelchair, RW, house is accessible, Medicare//[facility], full code, and PCP is Dr Gricel Castro. Will follow to set up [place] [phone] if ordered. Care Management Interventions  PCP Verified by CM:  Yes  Transition of Care Consult (CM Consult): Discharge Planning  MyChart Signup: No  Discharge Durable Medical Equipment: Yes  Physical Therapy Consult: No  Occupational Therapy Consult: No  Speech Therapy Consult: No  Current Support Network: Lives with Caregiver, Family Lives Nearby  Confirm Follow Up Transport: Family  Plan discussed with Pt/Family/Caregiver: Yes  Freedom of Choice Offered: Yes  Discharge Location  Discharge Placement: Home

## 2019-04-11 NOTE — PROGRESS NOTES
Bedside shift change report given to MARY Chun (oncoming nurse) by Lisa (offgoing nurse). Report included the following information SBAR, Kardex, Procedure Summary, Intake/Output, MAR, Accordion and Recent Results.

## 2019-04-11 NOTE — PROGRESS NOTES
Hospitalist Progress Note    NAME: Seb Melgar   :  1970   MRN:  [id number]       Assessment / Plan:  Severe sepsis in the setting of acute colitis and UTI  Acute colitis, concerning for Cdiff infection given multiple abx exposure  PNA in CXR  -CXR showed PNA, however clinically pt does not have clinical symptoms of pna.  levaquin discontinued idue to severe colitis  -CT a/p showed colitis in the transverse colon. KUB done prior to colonoscopy showed transverse colon dilation up to 8cm   -stool cx and cdiff neg but Cscope on 4/10 with pseudomembranous colitis, biopsy showed exudate and necrosis  -cont' PO vancomycin and IV flagyl  -diet advance as tolerated  -GI/CRS/ID consultation appreciated. No need for surgery at this time as per CRS' rec    Hx of recurrent UTI   -UA suggestive of UTI, but ucx neg  -pt recently completed 2 rounds of agumentin and 1 round of ceftin prior to admission per pt's mother  -she takes prophylactic macrobid daily for recurrent UTI, prescribed by her urologist    History of seizure disorder  -seizure precaution, chronic elevation of phenytoin level, appreciate neuro's consultation   -cont' PTA seizure meds    Hypokalemia  -K 3.0, replete today    History of chronic hydrocephalus  -supportive care. Pt is independent of ADLs    Obesity- Body mass index is 39.84 kg/m². Code status: Full  Prophylaxis: Hep SQ  Recommended Disposition:  PT, OT, RN     Subjective:     Chief Complaint / Reason for Physician Visit  Pt seen with mom present in room. She feels slightly better, still having diarrhea. Discussed with RN events overnight.      Review of Systems:  Symptom Y/N Comments  Symptom Y/N Comments   Fever/Chills n   Chest Pain     Poor Appetite    Edema     Cough    Abdominal Pain y mild   Sputum    Joint Pain     SOB/GOLDEN n   Pruritis/Rash     Nausea/vomit    Tolerating PT/OT     Diarrhea y   Tolerating Diet     Constipation    Other       Could NOT obtain due to: Objective:     VITALS:   Last 24hrs VS reviewed since prior progress note. Most recent are:  Patient Vitals for the past 24 hrs:   Temp Pulse Resp BP SpO2   04/11/19 1524 98.9 °F (37.2 °C) 79 14 (!) 156/103 97 %   04/11/19 1148 97.7 °F (36.5 °C) 77 18 (!) 193/101 98 %   04/11/19 0722 98.9 °F (37.2 °C) 82 17 (!) 175/106 98 %   04/11/19 0329 98.7 °F (37.1 °C) 72 18 (!) 153/95 98 %   04/10/19 2306 98.2 °F (36.8 °C) 75 16 (!) 156/94 97 %   04/10/19 2020 -- -- -- 160/90 --   04/10/19 2010 99 °F (37.2 °C) 79 18 (!) 164/105 96 %   04/10/19 1552 98.1 °F (36.7 °C) 74 18 (!) 142/93 98 %       Intake/Output Summary (Last 24 hours) at 4/11/2019 1527  Last data filed at 4/11/2019 0547  Gross per 24 hour   Intake 100 ml   Output 250 ml   Net -150 ml        PHYSICAL EXAM:  General: WD, WN. Alert, NAD  EENT:  EOMI. Anicteric sclerae. MMM  Resp:  CTA bilaterally, no wheezing or rales. No accessory muscle use  CV:  Regular  rhythm,  No edema  GI:  Soft, Non distended, Non tender.  +Bowel sounds  Neurologic:  Alert and oriented X 3, normal speech  Psych:   Fair insight. Not anxious nor agitated  Skin:  No rashes. No jaundice    Reviewed most current lab test results and cultures  YES  Reviewed most current radiology test results   YES  Review and summation of old records today    NO  Reviewed patient's current orders and MAR    YES  PMH/SH reviewed - no change compared to H&P  ________________________________________________________________________  Care Plan discussed with:    Comments   Patient x    Family  x Pt's mom   RN x    Care Manager     Consultant                        Multidiciplinary team rounds were held today with , nursing, pharmacist and clinical coordinator. Patient's plan of care was discussed; medications were reviewed and discharge planning was addressed.      ________________________________________________________________________  Total NON critical care TIME:  35   Minutes    Total CRITICAL CARE TIME Spent:   Minutes non procedure based      Comments   >50% of visit spent in counseling and coordination of care     ________________________________________________________________________  Bernadine Salmon MD     Procedures: see electronic medical records for all procedures/Xrays and details which were not copied into this note but were reviewed prior to creation of Plan. LABS:  I reviewed today's most current labs and imaging studies.   Pertinent labs include:  Recent Labs     04/11/19 0137 04/09/19  0434   WBC 6.6 14.1*   HGB 13.2 13.4   HCT 37.1 39.7    129*     Recent Labs     04/11/19 0137 04/09/19  0434    138   K 3.0* 3.5   * 111*   CO2 19* 17*   GLU 84 83   BUN 5* 11   CREA 0.38* 0.41*   CA 7.5* 7.4*       Signed: Bernadine Salmon MD

## 2019-04-11 NOTE — PROGRESS NOTES
Infectious Disease Progress Note      IMPRESSION:   · Sepsis   · Fevers with vomiting , diarrhea, previously treated for UTI & thereafter B/L ear infections  · Colitis involving  transverse colon on CT , diarrhea with blood + ,  C.diff negative  · S/p colonoscopy - diffuse yellow adherent exudate suggestive of pseudomembranes   · MANUEL  Pneumonia on CXR , pt clinically not symptomatic  · Currently no evidence of UTI ,  ear symptoms also improved  · CT ear - minimal fluid R/ mastoid tip air cells, no other fluid in middle ear   · H/o recurrent B/l ear infections since childhood , plan was for tube placement by ENT  · H/o recurrent UTI since childhood , worse over past 3 years  · Congenitalstatic encephalopathy & seizure disorder         PLAN:      · Continue po Vancomycin,  Flagyl IV     · DC  Levaquin in view of severe colitis , lack of symptoms of pneumonia at present , pt has been on 3 courses of out patient antibiotic therapy ( CXR findings probably lagging)   · Urology office notes reviewed-h/o  recurrent UTI , chronic irritative voiding symptoms, mixed incontinence,incomplete bladder emptying . Pt was on Macrobid po, last office note was for er to stop , start on Hiprex, Azo as needed. Subjective:     Pt seen . Resting , arousable  . Afebrile Mom at edside    Review of Systems:  A comprehensive review of systems was negative except for that written in the History of Present Illness. 10 point ROS obtained . All other systems negative . Objective:     Blood pressure (!) 156/94, pulse 75, temperature 98.2 °F (36.8 °C), resp. rate 16, height 5' 5\" (1.651 m), weight 239 lb 6.4 oz (108.6 kg), SpO2 97 %, not currently breastfeeding.   Temp (24hrs), Av.1 °F (36.7 °C), Min:97.6 °F (36.4 °C), Max:99 °F (37.2 °C)      Patient Vitals for the past 24 hrs:   Temp Pulse Resp BP SpO2   04/10/19 2306 98.2 °F (36.8 °C) 75 16 (!) 156/94 97 %   04/10/19 2020 -- -- -- 160/90 --   04/10/19 2010 99 °F (37.2 °C) 79 18 (!) 164/105 96 %   04/10/19 1552 98.1 °F (36.7 °C) 74 18 (!) 142/93 98 %   04/10/19 1200 -- 75 20 (!) 175/97 99 %   04/10/19 1155 -- 77 21 (!) 189/100 98 %   04/10/19 1150 -- 78 21 (!) 182/106 99 %   04/10/19 1145 -- 80 20 (!) 176/109 100 %   04/10/19 1053 97.9 °F (36.6 °C) 85 20 168/89 98 %   04/10/19 0736 98 °F (36.7 °C) 75 17 151/80 97 %   04/10/19 0706 -- -- -- (!) 154/94 --   04/10/19 0533 97.6 °F (36.4 °C) 78 16 170/85 98 %         Lines:  Peripheral IV:       Physical Exam:   General:   Resting arousable , cooperative, well developed, appears stated age   Eyes:  Sclera anicteric. Pupils equally round and reactive to light. Mouth/Throat: Mucous membranes normal, oral pharynx clear   Neck: Supple   Lungs:   Clear to auscultation bilaterally, good effort   CV:  Regular rate and rhythm,no murmur, click, rub or gallop   Abdomen:   Soft, non-tender.  bowel sounds normal. non-distended   Extremities: No cyanosis or edema   Skin: Skin color, texture, turgor normal. no acute rash or lesions   Lymph nodes: Cervical and supraclavicular normal   Musculoskeletal: No swelling or deformity   Lines/Devices:  Intact, no erythema, drainage or tenderness   Psych: Alert and oriented, normal mood affect       Data Review:   CBC:   Recent Labs     04/09/19  0434 04/08/19  0545   WBC 14.1* 16.7*   RBC 3.84 3.98   HGB 13.4 13.9   HCT 39.7 40.0   * 138*   GRANS 74 79*   LYMPH 15 11*   EOS 1 0     CMP:   Recent Labs     04/09/19  0434 04/08/19  0545   GLU 83 118*    140   K 3.5 3.6   * 112*   CO2 17* 20*   BUN 11 17   CREA 0.41* 0.55   CA 7.4* 7.2*   AGAP 10 8   BUCR 27* 31*       Studies:      Lab Results   Component Value Date/Time    Culture result:  04/08/2019 07:33 AM     NO ROUTINE ENTERIC PATHOGENS ISOLATED INCLUDING SALMONELLA, SHIGELLA, YERSINIA, VIBRIO OR SHIGA TOXIN PRODUCING E. COLI    Culture result: MARKEDLY REDUCED COLIFORMS NOTED 04/08/2019 07:33 AM    Culture result: NO SIGNIFICANT GROWTH 04/07/2019 08:53 PM    Culture result: NO GROWTH 3 DAYS 04/07/2019 08:51 PM    Culture result: NO GROWTH 3 DAYS 04/07/2019 08:51 PM        XR Results (most recent):  [facility]   XR ABD (KUB)    Narrative INDICATION:  Colon distention    COMPARISON: 4/10/2019 7032    FINDINGS:     Single views of the abdomen submitted for review. Significantly decreased large bowel gaseous distention      Impression IMPRESSION:    Significantly decreased large bowel gaseous distention           Patient Active Problem List   Diagnosis Code    Seizure ([facility] [address] R56.9    Chronic migraine G43.709    Mental developmental delay F81.9    Severe obesity (BMI 35.0-39. 9) E66.01    Sepsis ([facility] [address] A41.9    Diarrhea R19.7    Colitis K52.9    NSAID long-term use Z79.1    Blood in stool K92.1         ICD-10-CM ICD-9-CM    1. Acute cystitis without hematuria N30.00 595.0    2. Noninfectious gastroenteritis, unspecified type K52.9 558.9    3. Hx of idiopathic seizure Z87.898 V12.49        I have discussed the diagnosis with the patient and the intended plan as seen in the above orders. I have discussed medication side effects and warnings with the patient as well. Reviewed test results at length with patient    Anti-infectives:    Add Flagyl IV - 4/9  Levaquin po/ IV - 4/7  Vancomycin po -4/7-4/9- DC     Veronique Castillo MD [facility]

## 2019-04-11 NOTE — PROGRESS NOTES
Pharmacy - Clostridium Difficile (C. difficile) Monitoring     Date of Positive C. difficile:   4/8 C. Diff AG:Negative    Current C. difficile Antimicrobials:   Vancomycin 125 mg oral soln PO q6h (Start date 4/7; Day #5)    Other Antimicrobials (See Separate iVent for Details):   Levofloxacin 750 mg IV q24h (Start day: 4/7/19. Day 3)     Labs:  Recent Labs     04/09/19  0434 04/08/19  0545 04/07/19 2051   CREA 0.41* 0.55 0.76   WBC 14.1* 16.7* 23.0*     No results found for: CDDNAT    C. difficile Severity: Severe CDIFF: White blood cell count of ?15,000 cells/mL OR Serum creatinine level >1.5 mg/dL     Impression/Plan: C. Diff negative - recommend d/c of PO Vancomycin      Thank you,   Vadim Meneses [facility]    [email or web address]

## 2019-04-12 LAB
ANION GAP SERPL CALC-SCNC: 8 MMOL/L (ref 5–15)
BUN SERPL-MCNC: 3 MG/DL (ref 6–20)
BUN/CREAT SERPL: 8 (ref 12–20)
CALCIUM SERPL-MCNC: 7.8 MG/DL (ref 8.5–10.1)
CHLORIDE SERPL-SCNC: 112 MMOL/L (ref 97–108)
CO2 SERPL-SCNC: 21 MMOL/L (ref 21–32)
CREAT SERPL-MCNC: 0.36 MG/DL (ref 0.55–1.02)
GLUCOSE SERPL-MCNC: 99 MG/DL (ref 65–100)
POTASSIUM SERPL-SCNC: 3.2 MMOL/L (ref 3.5–5.1)
SODIUM SERPL-SCNC: 141 MMOL/L (ref 136–145)

## 2019-04-12 PROCEDURE — 74011250637 HC RX REV CODE- 250/637: Performed by: INTERNAL MEDICINE

## 2019-04-12 PROCEDURE — 97162 PT EVAL MOD COMPLEX 30 MIN: CPT

## 2019-04-12 PROCEDURE — 74011250636 HC RX REV CODE- 250/636: Performed by: INTERNAL MEDICINE

## 2019-04-12 PROCEDURE — 97116 GAIT TRAINING THERAPY: CPT

## 2019-04-12 PROCEDURE — 65660000000 HC RM CCU STEPDOWN

## 2019-04-12 PROCEDURE — 36415 COLL VENOUS BLD VENIPUNCTURE: CPT

## 2019-04-12 PROCEDURE — 74011250637 HC RX REV CODE- 250/637: Performed by: SPECIALIST

## 2019-04-12 PROCEDURE — 80048 BASIC METABOLIC PNL TOTAL CA: CPT

## 2019-04-12 RX ORDER — HYDRALAZINE HYDROCHLORIDE 20 MG/ML
10 INJECTION INTRAMUSCULAR; INTRAVENOUS
Status: DISCONTINUED | OUTPATIENT
Start: 2019-04-12 | End: 2019-04-15 | Stop reason: HOSPADM

## 2019-04-12 RX ORDER — POTASSIUM CHLORIDE 20 MEQ/1
40 TABLET, EXTENDED RELEASE ORAL 2 TIMES DAILY
Status: DISPENSED | OUTPATIENT
Start: 2019-04-12 | End: 2019-04-13

## 2019-04-12 RX ADMIN — Medication 10 ML: at 06:26

## 2019-04-12 RX ADMIN — METRONIDAZOLE 500 MG: 500 INJECTION, SOLUTION INTRAVENOUS at 06:26

## 2019-04-12 RX ADMIN — VANCOMYCIN HYDROCHLORIDE 125 MG: KIT at 06:26

## 2019-04-12 RX ADMIN — VANCOMYCIN HYDROCHLORIDE 125 MG: KIT at 18:34

## 2019-04-12 RX ADMIN — Medication 10 ML: at 22:49

## 2019-04-12 RX ADMIN — HEPARIN SODIUM 5000 UNITS: 5000 INJECTION INTRAVENOUS; SUBCUTANEOUS at 15:49

## 2019-04-12 RX ADMIN — Medication 10 ML: at 22:48

## 2019-04-12 RX ADMIN — Medication 10 ML: at 15:49

## 2019-04-12 RX ADMIN — HEPARIN SODIUM 5000 UNITS: 5000 INJECTION INTRAVENOUS; SUBCUTANEOUS at 09:36

## 2019-04-12 RX ADMIN — MIRABEGRON 25 MG: 25 TABLET, FILM COATED, EXTENDED RELEASE ORAL at 09:35

## 2019-04-12 RX ADMIN — METRONIDAZOLE 500 MG: 500 INJECTION, SOLUTION INTRAVENOUS at 15:49

## 2019-04-12 RX ADMIN — FLUOXETINE HYDROCHLORIDE 20 MG: 20 CAPSULE ORAL at 09:35

## 2019-04-12 RX ADMIN — POTASSIUM CHLORIDE 40 MEQ: 20 TABLET, EXTENDED RELEASE ORAL at 17:35

## 2019-04-12 RX ADMIN — Medication 10 ML: at 15:50

## 2019-04-12 RX ADMIN — VANCOMYCIN HYDROCHLORIDE 125 MG: KIT at 15:48

## 2019-04-12 RX ADMIN — Medication 10 ML: at 06:27

## 2019-04-12 RX ADMIN — PHENYTOIN SODIUM 200 MG: 100 CAPSULE, EXTENDED RELEASE ORAL at 09:36

## 2019-04-12 RX ADMIN — METRONIDAZOLE 500 MG: 500 INJECTION, SOLUTION INTRAVENOUS at 22:48

## 2019-04-12 NOTE — PROGRESS NOTES
Infectious Disease Progress Note      IMPRESSION:   · Sepsis   · Fevers with vomiting , diarrhea, previously treated for UTI & thereafter B/L ear infections now resolved  · Colitis involving  transverse colon on CT , diarrhea with blood + ,  C.diff negative, clinically improving , abdominal pain, tenderness less  · S/p colonoscopy - diffuse yellow adherent exudate suggestive of pseudomembranes   · MANUEL  Pneumonia on CXR , pt clinically not symptomatic  · Currently no evidence of UTI ,  ear symptoms also improved  · CT ear - minimal fluid R/ mastoid tip air cells, no other fluid in middle ear   · H/o recurrent B/l ear infections since childhood , plan was for tube placement by ENT  · H/o recurrent UTI since childhood , worse over past 3 years  · Congenitalstatic encephalopathy & seizure disorder         PLAN:      · Continue po Vancomycin,  Flagyl IV   X 7 days total   · S/p DC  Levaquin in view of severe colitis , lack of symptoms of pneumonia at present , pt has been on 3 courses of out patient antibiotic therapy ( CXR findings probably lagging)   · Urology office notes reviewed-h/o  recurrent UTI , chronic irritative voiding symptoms, mixed incontinence,incomplete bladder emptying . Pt was on Macrobid po, last office note was for er to stop , start on Hiprex, Azo as needed. · D/w mom -minimise antibiotic use in the future         Subjective:     Pt seen . Resting , arousable  . Abdominal pain less Afebrile Mom at bedside    Review of Systems:  A comprehensive review of systems was negative except for that written in the History of Present Illness. 10 point ROS obtained . All other systems negative . Objective:     Blood pressure (!) 174/93, pulse 82, temperature 97.9 °F (36.6 °C), resp. rate 16, height 5' 5\" (1.651 m), weight 233 lb 6.4 oz (105.9 kg), SpO2 97 %, not currently breastfeeding.   Temp (24hrs), Av.3 °F (36.8 °C), Min:97.7 °F (36.5 °C), Max:98.9 °F (37.2 °C)      Patient Vitals for the past 24 hrs:   Temp Pulse Resp BP SpO2   04/12/19 0327 97.9 °F (36.6 °C) 82 16 (!) 174/93 97 %   04/11/19 2240 98.2 °F (36.8 °C) 76 18 (!) 139/101 97 %   04/11/19 1911 97.9 °F (36.6 °C) 80 18 (!) 164/95 96 %   04/11/19 1524 98.9 °F (37.2 °C) 79 14 (!) 156/103 97 %   04/11/19 1148 97.7 °F (36.5 °C) 77 18 (!) 193/101 98 %   04/11/19 0722 98.9 °F (37.2 °C) 82 17 (!) 175/106 98 %         Lines:  Peripheral IV:       Physical Exam:   General:   Resting arousable , cooperative, well developed, appears stated age   Eyes:  Sclera anicteric. Pupils equally round and reactive to light. Mouth/Throat: Mucous membranes normal, oral pharynx clear   Neck: Supple   Lungs:   Clear to auscultation bilaterally, good effort   CV:  Regular rate and rhythm,no murmur, click, rub or gallop   Abdomen:   Soft, mildly -tender.  bowel sounds normal. non-distended   Extremities: No cyanosis or edema   Skin: Skin color, texture, turgor normal. no acute rash or lesions   Lymph nodes: Cervical and supraclavicular normal   Musculoskeletal: No swelling or deformity   Lines/Devices:  Intact, no erythema, drainage or tenderness   Psych: Alert and oriented, normal mood affect       Data Review:   CBC:   Recent Labs     04/11/19  0137   WBC 6.6   RBC 3.67*   HGB 13.2   HCT 37.1      GRANS 54   LYMPH 30   EOS 4     CMP:   Recent Labs     04/12/19  0332 04/11/19  0137   GLU 99 84    141   K 3.2* 3.0*   * 115*   CO2 21 19*   BUN 3* 5*   CREA 0.36* 0.38*   CA 7.8* 7.5*   AGAP 8 7   BUCR 8* 13       Studies:      Lab Results   Component Value Date/Time    Culture result:  04/08/2019 07:33 AM     NO ROUTINE ENTERIC PATHOGENS ISOLATED INCLUDING SALMONELLA, SHIGELLA, YERSINIA, VIBRIO OR SHIGA TOXIN PRODUCING E. COLI    Culture result: MARKEDLY REDUCED COLIFORMS NOTED 04/08/2019 07:33 AM    Culture result: NO SIGNIFICANT GROWTH 04/07/2019 08:53 PM    Culture result: NO GROWTH 4 DAYS 04/07/2019 08:51 PM    Culture result: NO GROWTH 4 DAYS 04/07/2019 08:51 PM        XR Results (most recent):  Results from [place] encounter on 04/07/19   XR ABD (KUB)    Narrative INDICATION:  Colon distention    COMPARISON: 4/10/2019 4997    FINDINGS:     Single views of the abdomen submitted for review. Significantly decreased large bowel gaseous distention      Impression IMPRESSION:    Significantly decreased large bowel gaseous distention           Patient Active Problem List   Diagnosis Code    Seizure ([facility] [address] R56.9    Chronic migraine G43.709    Mental developmental delay F81.9    Severe obesity (BMI 35.0-39. 9) E66.01    Sepsis ([facility] [address] A41.9    Diarrhea R19.7    Colitis K52.9    NSAID long-term use Z79.1    Blood in stool K92.1         ICD-10-CM ICD-9-CM    1. Acute cystitis without hematuria N30.00 595.0    2. Noninfectious gastroenteritis, unspecified type K52.9 558.9    3. Hx of idiopathic seizure Z87.898 V12.49        I have discussed the diagnosis with the patient and the intended plan as seen in the above orders. I have discussed medication side effects and warnings with the patient as well.     Reviewed test results at length with patient    Anti-infectives:   Flagyl IV - 4/9  Vancomycin po -4/7-4/9- DC- 4/10-  Levaquin po/ IV - 4/7- 4/10   Thor Burk MD FACP

## 2019-04-12 NOTE — PROGRESS NOTES
2000: Pt. IV fluids off and left upper arm is 3+ swelling. IV is infiltrated. Unsuccesful attempt made to place a new IV due to profuse swelling of BUE. Called nursing supervisor Hal Bearden for assistance who said he would come to help. 2245: New 20g IV placed in right AC. IVF restarted. Bedside shift change report given to MARY Jarrett (oncoming nurse) by Anna Meneses (offgoing nurse). Report included the following information SBAR, Kardex, Procedure Summary, Intake/Output, MAR and Recent Results.

## 2019-04-12 NOTE — PROGRESS NOTES
Problem: Mobility Impaired (Adult and Pediatric)  Goal: *Acute Goals and Plan of Care (Insert Text)  Description  Physical Therapy Goals  Initiated 4/12/2019  1. Patient will move from supine to sit and sit to supine , scoot up and down and roll side to side in bed with independence within 7 day(s). 2.  Patient will transfer from bed to chair and chair to bed with independence using the least restrictive device within 7 day(s). 3.  Patient will perform sit to stand with independence within 7 day(s). 4.  Patient will ambulate with independence for 150 feet with the least restrictive device within 7 day(s). 5.  Patient will ascend/descend 2 stairs with one sided handrail(s) with modified independence within 7 day(s). Outcome: Not Progressing Towards Goal   PHYSICAL THERAPY EVALUATION  Patient: Jeny Saeed (45 y.o. female)  Date: 4/12/2019  Primary Diagnosis: Sepsis ([facility] [address] [A41.9]  Procedure(s) (LRB):  COLONOSCOPY (N/A)  COLON BIOPSY (N/A) 2 Days Post-Op   Precautions:   Contact    ASSESSMENT :  Based on the objective data described below, the patient presents with decreased strength, good functional mobility, and mildly unsteady gait following admission for sepsis. Patient received resting in bed, agreeable and cleared for therapy. Patient reports she was getting up ad salomon to the [facility] although feels weaker. She required SBA-CGA for all functional mobility this date. Patient ambulated in the room, demonstrating mild path deviations and trunk sway with 1 LOB noted. Encouraged patient to sit in the bedside chair although patient requesting to return to supine to rest. Patient will likely not need PT services at discharge. Encouraged patient to ambulate with nursing staff and be up in the chair for all meals over the weekend to regain strength and independence. Patient will benefit from skilled intervention to address the above impairments.   Patient?s rehabilitation potential is considered to be Good  Factors which may influence rehabilitation potential include:   ? None noted  ? Mental ability/status  ? Medical condition  ? Home/family situation and support systems  ? Safety awareness  ? Pain tolerance/management  ? Other:      PLAN :  Recommendations and Planned Interventions:  ?           Bed Mobility Training             ? Neuromuscular Re-Education  ? Transfer Training                   ? Orthotic/Prosthetic Training  ? Gait Training                         ? Modalities  ? Therapeutic Exercises           ? Edema Management/Control  ? Therapeutic Activities            ? Patient and Family Training/Education  ? Other (comment):    Frequency/Duration: Patient will be followed by physical therapy  3 times a week to address goals. Discharge Recommendations: None  Further Equipment Recommendations for Discharge: none      SUBJECTIVE:   Patient stated ? I want to take a nap. ?    OBJECTIVE DATA SUMMARY:   HISTORY:    Past Medical History:   Diagnosis Date    Bladder incontinence     Ill-defined condition     chronic hydrocephalus; no shunt, well managed    Seizure disorder [facility])      Past Surgical History:   Procedure Laterality Date    COLONOSCOPY N/A 4/10/2019    COLONOSCOPY performed by Deloris Cunha MD at [facility] ENDOSCOPY    HX APPENDECTOMY      HX OTHER SURGICAL  2002    remove a lypoma     Prior Level of Function/Home Situation: patient lives with her parents. She is independent with all aspects of functional mobility and ADLS. She does not drive due to seizures.    Personal factors and/or comorbidities impacting plan of care: seizures, weakness    Home Situation  Home Environment: Private residence  # Steps to Enter: 3  Rails to Enter: Yes  Hand Rails : Bilateral  Wheelchair Ramp: No  One/Two Story Residence: Two story, live on 1st floor  Lift Chair Available: Yes  Living Alone: No  Support Systems: Parent  Patient Expects to be Discharged to[de-identified] Private residence  Current DME Used/Available at Home: Patrica Sheller, straight, Walker, rolling, Lift chair, Grab bars, Shower chair  Tub or Shower Type: Shower    EXAMINATION/PRESENTATION/DECISION MAKING:   Critical Behavior:  Neurologic State: Alert           Hearing: Auditory  Auditory Impairment: None  Skin:    Edema:   Range Of Motion:  AROM: Within functional limits           PROM: Within functional limits           Strength:    Strength: Generally decreased, functional                    Tone & Sensation:   Tone: Normal              Sensation: Intact               Coordination:  Coordination: Within functional limits  Vision:      Functional Mobility:  Bed Mobility:  Rolling: Supervision  Supine to Sit: Supervision  Sit to Supine: Supervision  Scooting: Supervision; Additional time  Transfers:  Sit to Stand: Stand-by assistance  Stand to Sit: Stand-by assistance                       Balance:   Sitting: Intact; Without support  Standing: Impaired; Without support  Standing - Static: Good  Standing - Dynamic : Fair  Ambulation/Gait Training:  Distance (ft): 6 Feet (ft)  Assistive Device: Gait belt  Ambulation - Level of Assistance: Contact guard assistance     Gait Description (WDL): Exceptions to WDL  Gait Abnormalities: Decreased step clearance; Path deviations        Base of Support: Widened     Speed/Fanta: Pace decreased (<100 feet/min)  Step Length: Right shortened;Left shortened                     Stairs: Therapeutic Exercises:       Functional Measure:  Barthel Index:    Bathin  Bladder: 10  Bowels: 10  Groomin  Dressing: 10  Feeding: 10  Mobility: 10  Stairs: 5  Toilet Use: 5  Transfer (Bed to Chair and Back): 10  Total: 75/100       Percentage of impairment   0%   1-19%   20-39%   40-59%   60-79%   80-99%   100%   Barthel Score 0-100 100 99-80 79-60 59-40 20-39 1-19   0     The Barthel ADL Index: Guidelines  1.  The index should be used as a record of what a patient does, not as a record of what a patient could do. 2. The main aim is to establish degree of independence from any help, physical or verbal, however minor and for whatever reason. 3. The need for supervision renders the patient not independent. 4. A patient's performance should be established using the best available evidence. Asking the patient, friends/relatives and nurses are the usual sources, but direct observation and common sense are also important. However direct testing is not needed. 5. Usually the patient's performance over the preceding 24-48 hours is important, but occasionally longer periods will be relevant. 6. Middle categories imply that the patient supplies over 50 per cent of the effort. 7. Use of aids to be independent is allowed. Elizabeth Vela., Barthel, D.W. (1550). Functional evaluation: the Barthel Index. [address] [facility] (14)2. ASHWIN Roberts, Douglas Miller., Harvey Powers., [facility], [address] [facility] (1999). Measuring the change indisability after inpatient rehabilitation; comparison of the responsiveness of the Barthel Index and Functional Hart Measure. Journal of Neurology, Neurosurgery, and Psychiatry, 66(4), 638-062. El Silva, N.J.A, GEORGETTE Ferro, & Sharath James M.A. (2004.) Assessment of post-stroke quality of life in cost-effectiveness studies: The usefulness of the Barthel Index and the EuroQoL-5D.  Quality of Life Research, 15, 300-34            Physical Therapy Evaluation Charge Determination   History Examination Presentation Decision-Making   MEDIUM  Complexity : 1-2 comorbidities / personal factors will impact the outcome/ POC  MEDIUM Complexity : 3 Standardized tests and measures addressing body structure, function, activity limitation and / or participation in recreation  MEDIUM Complexity : Evolving with changing characteristics  Other outcome measures Barthel   MEDIUM      Based on the above components, the patient evaluation is determined to be of the following complexity level: MEDIUM    Pain:                    Activity Tolerance:   Good, VSS. Please refer to the flowsheet for vital signs taken during this treatment. After treatment:   ?         Patient left in no apparent distress sitting up in chair  ? Patient left in no apparent distress in bed  ? Call bell left within reach  ? Nursing notified  ? Caregiver present  ? Bed alarm activated    COMMUNICATION/EDUCATION:   The patient?s plan of care was discussed with: Registered Nurse and . ?         Fall prevention education was provided and the patient/caregiver indicated understanding. ? Patient/family have participated as able in goal setting and plan of care. ?         Patient/family agree to work toward stated goals and plan of care. ?         Patient understands intent and goals of therapy, but is neutral about his/her participation. ? Patient is unable to participate in goal setting and plan of care.     Thank you for this referral.  Milka Arellano, PT, DPT   Time Calculation: 19 mins

## 2019-04-12 NOTE — PROGRESS NOTES
Bedside shift change report given to Deanna Hutchinson RN (oncoming nurse) by Mojgan Quinteros RN (offgoing nurse). Report included the following information SBAR, Kardex, Procedure Summary, Intake/Output, MAR and Recent Results.

## 2019-04-12 NOTE — PROGRESS NOTES
Hospitalist Progress Note    NAME: Atif Olson   :  1970   MRN:  [id number]       Assessment / Plan:  Severe sepsis in the setting of acute colitis and UTI  Acute colitis, concerning for Cdiff infection given multiple abx exposure  PNA in CXR  -CXR showed PNA, however clinically pt does not have clinical symptoms of pna.  levaquin discontinued idue to severe colitis  -CT a/p showed colitis in the transverse colon. KUB done prior to colonoscopy showed transverse colon dilation up to 8cm   -stool cx and cdiff neg but Cscope on 4/10 with pseudomembranous colitis, biopsy showed exudate and necrosis  -cont' PO vancomycin and IV flagyl  -add floraq  -on full liquid, pt not ready to advance diet  -GI/CRS/ID consultation appreciated. No need for surgery at this time as per CRS' rec    Hx of recurrent UTI   -UA suggestive of UTI, but ucx neg  -pt recently completed 2 rounds of agumentin and 1 round of ceftin prior to admission per pt's mother  -she takes prophylactic macrobid daily for recurrent UTI, prescribed by her urologist.  Will discontinue on discharge as per ID's rec     History of seizure disorder  -seizure precaution, chronic elevation of phenytoin level, appreciate neuro's consultation   -cont' PTA seizure meds    Hypokalemia  -K 3.2, replete today    History of chronic hydrocephalus  -supportive care. Pt is independent of ADLs    Obesity- Body mass index is 38.84 kg/m². Code status: Full  Prophylaxis: Hep SQ  Recommended Disposition:  PT, OT, RN     Subjective:     Chief Complaint / Reason for Physician Visit  Pt seen. Had 3 episodes of diarrhea overnight. No abd pain today. Discussed with RN events overnight.      Review of Systems:  Symptom Y/N Comments  Symptom Y/N Comments   Fever/Chills n   Chest Pain     Poor Appetite    Edema     Cough    Abdominal Pain n    Sputum    Joint Pain     SOB/GOLDEN n   Pruritis/Rash     Nausea/vomit    Tolerating PT/OT     Diarrhea y   Tolerating Diet Constipation    Other       Could NOT obtain due to:      Objective:     VITALS:   Last 24hrs VS reviewed since prior progress note. Most recent are:  Patient Vitals for the past 24 hrs:   Temp Pulse Resp BP SpO2   04/12/19 1111 98.9 °F (37.2 °C) 78 18 (!) 157/100 94 %   04/12/19 0806 99.2 °F (37.3 °C) 75 16 (!) 148/92 98 %   04/12/19 0327 97.9 °F (36.6 °C) 82 16 (!) 174/93 97 %   04/11/19 2240 98.2 °F (36.8 °C) 76 18 (!) 139/101 97 %   04/11/19 1911 97.9 °F (36.6 °C) 80 18 (!) 164/95 96 %   04/11/19 1524 98.9 °F (37.2 °C) 79 14 (!) 156/103 97 %   04/11/19 1148 97.7 °F (36.5 °C) 77 18 (!) 193/101 98 %       Intake/Output Summary (Last 24 hours) at 4/12/2019 1121  Last data filed at 4/12/2019 0327  Gross per 24 hour   Intake 1048.33 ml   Output --   Net 1048.33 ml        PHYSICAL EXAM:  General: WD, WN. Alert, NAD  EENT:  EOMI. Anicteric sclerae. MMM  Resp:  CTA bilaterally, no wheezing or rales. No accessory muscle use  CV:  Regular  rhythm,  No edema  GI:  Soft, Non distended, Non tender.  +Bowel sounds  Neurologic:  Alert and oriented X 3, normal speech  Psych:   Fair insight. Not anxious nor agitated  Skin:  No rashes. No jaundice    Reviewed most current lab test results and cultures  YES  Reviewed most current radiology test results   YES  Review and summation of old records today    NO  Reviewed patient's current orders and MAR    YES  PMH/SH reviewed - no change compared to H&P  ________________________________________________________________________  Care Plan discussed with:    Comments   Patient x    Family  x Pt's mom   RN x    Care Manager     Consultant                        Multidiciplinary team rounds were held today with , nursing, pharmacist and clinical coordinator. Patient's plan of care was discussed; medications were reviewed and discharge planning was addressed.      ________________________________________________________________________  Total NON critical care TIME:  35 Minutes    Total CRITICAL CARE TIME Spent:   Minutes non procedure based      Comments   >50% of visit spent in counseling and coordination of care     ________________________________________________________________________  Rosy Talley MD     Procedures: see electronic medical records for all procedures/Xrays and details which were not copied into this note but were reviewed prior to creation of Plan. LABS:  I reviewed today's most current labs and imaging studies.   Pertinent labs include:  Recent Labs     04/11/19 0137   WBC 6.6   HGB 13.2   HCT 37.1        Recent Labs     04/12/19 0332 04/11/19 0137    141   K 3.2* 3.0*   * 115*   CO2 21 19*   GLU 99 84   BUN 3* 5*   CREA 0.36* 0.38*   CA 7.8* 7.5*       Signed: Rosy Talley MD

## 2019-04-12 NOTE — PROGRESS NOTES
Infectious Disease Progress Note      IMPRESSION:   · Sepsis   · Fevers with vomiting , diarrhea, previously treated for UTI & thereafter B/L ear infections now resolved  · Colitis involving  transverse colon on CT , diarrhea with blood + ,  C.diff negative, clinically improving , abdominal pain, tenderness less  · S/p colonoscopy - diffuse yellow adherent exudate suggestive of pseudomembranes   · MANUEL  Pneumonia on CXR , pt clinically not symptomatic  · Currently no evidence of UTI ,  ear symptoms also improved  · CT ear - minimal fluid R/ mastoid tip air cells, no other fluid in middle ear   · H/o recurrent B/l ear infections since childhood , plan was for tube placement by ENT  · H/o recurrent UTI since childhood , worse over past 3 years  · Congenitalstatic encephalopathy & seizure disorder         PLAN:      · Continue po Vancomycin ,  Flagyl IV   per clinical course   · S/p DC  Levaquin in view of severe colitis , lack of symptoms of pneumonia at present , pt has been on 3 courses of out patient antibiotic therapy ( CXR findings probably lagging)   · Urology office notes reviewed-h/o  recurrent UTI , chronic irritative voiding symptoms, mixed incontinence,incomplete bladder emptying . Pt was on Macrobid po, last office note was for er to stop , start on Hiprex, Azo as needed. · D/w mom -minimise antibiotic use in the future         Subjective:     Pt seen . Resting , arousable  . Abdominal pain less Afebrile Mom at bedside    Review of Systems:  A comprehensive review of systems was negative except for that written in the History of Present Illness. 10 point ROS obtained . All other systems negative . Objective:     Blood pressure (!) 157/100, pulse 78, temperature 98.9 °F (37.2 °C), resp. rate 18, height 5' 5\" (1.651 m), weight 233 lb 6.4 oz (105.9 kg), SpO2 94 %, not currently breastfeeding.   Temp (24hrs), Av.5 °F (36.9 °C), Min:97.9 °F (36.6 °C), Max:99.2 °F (37.3 °C)      Patient Vitals for the past 24 hrs:   Temp Pulse Resp BP SpO2   04/12/19 1111 98.9 °F (37.2 °C) 78 18 (!) 157/100 94 %   04/12/19 0806 99.2 °F (37.3 °C) 75 16 (!) 148/92 98 %   04/12/19 0327 97.9 °F (36.6 °C) 82 16 (!) 174/93 97 %   04/11/19 2240 98.2 °F (36.8 °C) 76 18 (!) 139/101 97 %   04/11/19 1911 97.9 °F (36.6 °C) 80 18 (!) 164/95 96 %   04/11/19 1524 98.9 °F (37.2 °C) 79 14 (!) 156/103 97 %         Lines:  Peripheral IV:       Physical Exam:   General:   Awake , cooperative,    Eyes:  Sclera anicteric. Pupils equally round and reactive to light. Mouth/Throat: Mucous membranes normal, oral pharynx clear   Neck: Supple   Lungs:   Clear to auscultation bilaterally, good effort   CV:  Regular rate and rhythm,no murmur, click, rub or gallop   Abdomen:   Soft, mildly -tender.  bowel sounds normal. non-distended   Extremities: No cyanosis or edema   Skin: Skin color, texture, turgor normal. no acute rash or lesions   Lymph nodes: Cervical and supraclavicular normal   Musculoskeletal: No swelling or deformity   Lines/Devices:  Intact, no erythema, drainage or tenderness   Psych: Alert and oriented, normal mood affect       Data Review:   CBC:   Recent Labs     04/11/19  0137   WBC 6.6   RBC 3.67*   HGB 13.2   HCT 37.1      GRANS 54   LYMPH 30   EOS 4     CMP:   Recent Labs     04/12/19  0332 04/11/19  0137   GLU 99 84    141   K 3.2* 3.0*   * 115*   CO2 21 19*   BUN 3* 5*   CREA 0.36* 0.38*   CA 7.8* 7.5*   AGAP 8 7   BUCR 8* 13       Studies:      Lab Results   Component Value Date/Time    Culture result:  04/08/2019 07:33 AM     NO ROUTINE ENTERIC PATHOGENS ISOLATED INCLUDING SALMONELLA, SHIGELLA, YERSINIA, VIBRIO OR SHIGA TOXIN PRODUCING E. COLI    Culture result: MARKEDLY REDUCED COLIFORMS NOTED 04/08/2019 07:33 AM    Culture result: NO SIGNIFICANT GROWTH 04/07/2019 08:53 PM    Culture result: NO GROWTH 5 DAYS 04/07/2019 08:51 PM    Culture result: NO GROWTH 5 DAYS 04/07/2019 08:51 PM        XR Results (most recent):  Results from [place] encounter on 04/07/19   XR ABD (KUB)    Narrative INDICATION:  Colon distention    COMPARISON: 4/10/2019 7253    FINDINGS:     Single views of the abdomen submitted for review. Significantly decreased large bowel gaseous distention      Impression IMPRESSION:    Significantly decreased large bowel gaseous distention           Patient Active Problem List   Diagnosis Code    Seizure ([facility] [address] R56.9    Chronic migraine G43.709    Mental developmental delay F81.9    Severe obesity (BMI 35.0-39. 9) E66.01    Sepsis ([facility] [address] A41.9    Diarrhea R19.7    Colitis K52.9    NSAID long-term use Z79.1    Blood in stool K92.1         ICD-10-CM ICD-9-CM    1. Acute cystitis without hematuria N30.00 595.0    2. Noninfectious gastroenteritis, unspecified type K52.9 558.9    3. Hx of idiopathic seizure Z87.898 V12.49        I have discussed the diagnosis with the patient and the intended plan as seen in the above orders. I have discussed medication side effects and warnings with the patient as well.     Reviewed test results at length with patient    Anti-infectives:   Flagyl IV - 4/9  Vancomycin po -4/7-4/9- DC- 4/10-  Levaquin po/ IV - 4/7- 4/10   Yvette Sparrow MD [facility]

## 2019-04-12 NOTE — PROGRESS NOTES
Gastroenterology Progress Note  Elsa Braden PA-C covering for Dr. Laurent Fernandez    4/12/2019    Admit Date: 4/7/2019    Subjective: Follow up for:  1)  Colitis likely c diff related    2) Dilated transverse colon      Pt reprots pain is much improved today, repots she is about 3/10 right now. No N/V, she had about two liquid brown stools. No melena or hematochezia. She tolerated cream of wheat, and juice for breakfast, has also been eating yogurt without any issues. No nausea or vomiting. Ears are feeling better. No fevers or chills.        Current Facility-Administered Medications   Medication Dose Route Frequency    potassium chloride (K-DUR, KLOR-CON) SR tablet 40 mEq  40 mEq Oral BID    hydrALAZINE (APRESOLINE) 20 mg/mL injection 10 mg  10 mg IntraVENous Q6H PRN    [START ON 4/13/2019] lactobac ac& pc-s.therm-b.anim (JONNY Q/RISAQUAD)  1 Cap Oral DAILY    sodium chloride (NS) flush 5-40 mL  5-40 mL IntraVENous Q8H    sodium chloride (NS) flush 5-40 mL  5-40 mL IntraVENous PRN    vancomycin (FIRVANQ) 50 mg/mL oral solution 125 mg  125 mg Oral Q6H    metroNIDAZOLE (FLAGYL) IVPB premix 500 mg  500 mg IntraVENous Q8H    topiramate ER (TROKENDI XR) capsule 400 mg (Patient Supplied)  400 mg Oral QHS    phenytoin ER (DILANTIN ER) ER capsule 200 mg (Patient Supplied)  200 mg Oral DAILY    phenytoin ER (DILANTIN ER) ER capsule 330 mg (Patient Supplied)  330 mg Oral QHS    oxyCODONE IR (ROXICODONE) tablet 5 mg  5 mg Oral Q6H PRN    HYDROmorphone (PF) (DILAUDID) injection 1 mg  1 mg IntraVENous Q6H PRN    sodium chloride (NS) flush 5-10 mL  5-10 mL IntraVENous PRN    butalbital-acetaminophen-caffeine (FIORICET, ESGIC) -40 mg per tablet 1 Tab  1 Tab Oral Q6H PRN    FLUoxetine (PROzac) capsule 20 mg  20 mg Oral DAILY    mirabegron ER (MYRBETRIQ) tablet 25 mg  25 mg Oral DAILY    sodium chloride (NS) flush 5-40 mL  5-40 mL IntraVENous Q8H    sodium chloride (NS) flush 5-40 mL  5-40 mL IntraVENous PRN    acetaminophen (TYLENOL) tablet 650 mg  650 mg Oral Q6H PRN    ondansetron (ZOFRAN) injection 4 mg  4 mg IntraVENous Q6H PRN    docusate sodium (COLACE) capsule 100 mg  100 mg Oral DAILY PRN    heparin (porcine) injection 5,000 Units  5,000 Units SubCUTAneous Q8H        Objective:     Blood pressure (!) 157/100, pulse 78, temperature 98.9 °F (37.2 °C), resp. rate 18, height 5' 5\" (1.651 m), weight 105.9 kg (233 lb 6.4 oz), SpO2 94 %, not currently breastfeeding. No intake/output data recorded. 04/10 1901 - 04/12 0700  In: 1628.3 [P.O.:960; I.V.:668.3]  Out: 250 [Urine:250]    EXAM:   Gen: pleasant obese WF in NAD, resting in bed  HEENT: NCAT  Lungs: CTA B, no wheezes or rhonchi   Heart: RRR. No M/R/G  Abd: ND, NT, normal BS  Ext: No pitting edema. Data Review    Recent Results (from the past 24 hour(s))   METABOLIC PANEL, BASIC    Collection Time: 04/12/19  3:32 AM   Result Value Ref Range    Sodium 141 136 - 145 mmol/L    Potassium 3.2 (L) 3.5 - 5.1 mmol/L    Chloride 112 (H) 97 - 108 mmol/L    CO2 21 21 - 32 mmol/L    Anion gap 8 5 - 15 mmol/L    Glucose 99 65 - 100 mg/dL    BUN 3 (L) 6 - 20 MG/DL    Creatinine 0.36 (L) 0.55 - 1.02 MG/DL    BUN/Creatinine ratio 8 (L) 12 - 20      GFR est AA >60 >60 ml/min/1.73m2    GFR est non-AA >60 >60 ml/min/1.73m2    Calcium 7.8 (L) 8.5 - 10.1 MG/DL     Recent Labs     04/11/19 0137   WBC 6.6   HGB 13.2   HCT 37.1        Recent Labs     04/12/19  0332 04/11/19 0137    141   K 3.2* 3.0*   * 115*   CO2 21 19*   BUN 3* 5*   CREA 0.36* 0.38*   GLU 99 84   CA 7.8* 7.5*     No results for input(s): SGOT, GPT, ALT, AP, TBIL, TBILI, TP, ALB, GLOB, GGT, AML, LPSE in the last 72 hours. No lab exists for component: AMYP, HLPSE  No results for input(s): INR, PTP, APTT in the last 72 hours. No lab exists for component: INREXT, INREXT   No results for input(s): FE, TIBC, PSAT, FERR in the last 72 hours.    No results found for: FOL, RBCF   No results for input(s): PH, PCO2, PO2 in the last 72 hours. No results for input(s): CPK, CKNDX, TROIQ in the last 72 hours. No lab exists for component: CPKMB  No results found for: CHOL, CHOLX, CHLST, CHOLV, HDL, LDL, LDLC, DLDLP, TGLX, TRIGL, TRIGP, CHHD, CHHDX  No results found for: [facility]  Lab Results   Component Value Date/Time    Color BAUTISTA 04/07/2019 08:53 PM    Appearance CLOUDY (A) 04/07/2019 08:53 PM    Specific gravity 1.030 04/07/2019 08:53 PM    pH (UA) 5.5 04/07/2019 08:53 PM    Protein 30 (A) 04/07/2019 08:53 PM    Glucose NEGATIVE  04/07/2019 08:53 PM    Ketone TRACE (A) 04/07/2019 08:53 PM    Urobilinogen 0.2 04/07/2019 08:53 PM    Nitrites POSITIVE (A) 04/07/2019 08:53 PM    Leukocyte Esterase SMALL (A) 04/07/2019 08:53 PM    Epithelial cells FEW 04/07/2019 08:53 PM    Bacteria NEGATIVE  04/07/2019 08:53 PM    WBC 0-4 04/07/2019 08:53 PM    RBC 0-5 04/07/2019 08:53 PM        FINAL PATHOLOGIC DIAGNOSIS   Transverse colon, biopsy:   Exudate and necrosis, see comment   Comment   This specimen consists almost entirely of exudate and necrosis with a single minute fragment of viable colonic mucosa. Histologically, this is nonspecific and could be associated with either C. difficile or ischemic colitis. Assessment:     Active Problems:    Sepsis ([address] (4/7/2019)      Diarrhea (4/8/2019)      Colitis (4/8/2019)      NSAID long-term use (4/8/2019)      Blood in stool (4/8/2019)        Plan:     Pt is overall feeling a lot better today on PO Vancomycin and IV Flagyl. Continues to have diarrhea but not as severe. Can cont to advance diet as tolerated. Abdomen is less tender. Path returned showing necrosis and exudate consistent with C. Diff or ischemic colitis. Would recommend f/u colonoscopy once sx are improved as an O/P to look for healing. TINO Watts  04/12/19  11:35 AM      Agree with above note. bx not conclusive to c diff or ischemia.   In any event she is much better. We will continue her on PO Vancomycin and Flagyl for now. GI lite diet tomorrow. Call partners to see her one day. She will need a repeat colonoscopy in 6-8 weeks time to confirm healing and that there are no colitis sequelae. Stacey Shaw MD

## 2019-04-13 LAB
ANION GAP SERPL CALC-SCNC: 8 MMOL/L (ref 5–15)
BACTERIA SPEC CULT: NORMAL
BACTERIA SPEC CULT: NORMAL
BUN SERPL-MCNC: 3 MG/DL (ref 6–20)
BUN/CREAT SERPL: 7 (ref 12–20)
CALCIUM SERPL-MCNC: 8.3 MG/DL (ref 8.5–10.1)
CHLORIDE SERPL-SCNC: 111 MMOL/L (ref 97–108)
CO2 SERPL-SCNC: 22 MMOL/L (ref 21–32)
CREAT SERPL-MCNC: 0.45 MG/DL (ref 0.55–1.02)
GLUCOSE SERPL-MCNC: 99 MG/DL (ref 65–100)
MAGNESIUM SERPL-MCNC: 1.9 MG/DL (ref 1.6–2.4)
POTASSIUM SERPL-SCNC: 3.3 MMOL/L (ref 3.5–5.1)
SERVICE CMNT-IMP: NORMAL
SERVICE CMNT-IMP: NORMAL
SODIUM SERPL-SCNC: 141 MMOL/L (ref 136–145)

## 2019-04-13 PROCEDURE — 94760 N-INVAS EAR/PLS OXIMETRY 1: CPT

## 2019-04-13 PROCEDURE — 80048 BASIC METABOLIC PNL TOTAL CA: CPT

## 2019-04-13 PROCEDURE — 94762 N-INVAS EAR/PLS OXIMTRY CONT: CPT

## 2019-04-13 PROCEDURE — 74011250637 HC RX REV CODE- 250/637: Performed by: SPECIALIST

## 2019-04-13 PROCEDURE — 74011250637 HC RX REV CODE- 250/637: Performed by: INTERNAL MEDICINE

## 2019-04-13 PROCEDURE — 36415 COLL VENOUS BLD VENIPUNCTURE: CPT

## 2019-04-13 PROCEDURE — 65660000000 HC RM CCU STEPDOWN

## 2019-04-13 PROCEDURE — 74011250636 HC RX REV CODE- 250/636: Performed by: INTERNAL MEDICINE

## 2019-04-13 PROCEDURE — 83735 ASSAY OF MAGNESIUM: CPT

## 2019-04-13 RX ORDER — POTASSIUM CHLORIDE 20 MEQ/1
40 TABLET, EXTENDED RELEASE ORAL
Status: COMPLETED | OUTPATIENT
Start: 2019-04-13 | End: 2019-04-13

## 2019-04-13 RX ORDER — POTASSIUM CHLORIDE 20 MEQ/1
20 TABLET, EXTENDED RELEASE ORAL DAILY
Status: DISCONTINUED | OUTPATIENT
Start: 2019-04-14 | End: 2019-04-15 | Stop reason: HOSPADM

## 2019-04-13 RX ORDER — MICONAZOLE NITRATE 20 MG/G
CREAM TOPICAL 2 TIMES DAILY
Status: DISCONTINUED | OUTPATIENT
Start: 2019-04-13 | End: 2019-04-15 | Stop reason: HOSPADM

## 2019-04-13 RX ADMIN — Medication 10 ML: at 22:01

## 2019-04-13 RX ADMIN — VANCOMYCIN HYDROCHLORIDE 125 MG: KIT at 00:37

## 2019-04-13 RX ADMIN — BUTALBITAL, ACETAMINOPHEN AND CAFFEINE 1 TABLET: 50; 325; 40 TABLET ORAL at 09:27

## 2019-04-13 RX ADMIN — BUTALBITAL, ACETAMINOPHEN AND CAFFEINE 1 TABLET: 50; 325; 40 TABLET ORAL at 19:42

## 2019-04-13 RX ADMIN — FLUOXETINE HYDROCHLORIDE 20 MG: 20 CAPSULE ORAL at 09:28

## 2019-04-13 RX ADMIN — POTASSIUM CHLORIDE 40 MEQ: 20 TABLET, EXTENDED RELEASE ORAL at 11:26

## 2019-04-13 RX ADMIN — HYDRALAZINE HYDROCHLORIDE 10 MG: 20 INJECTION INTRAMUSCULAR; INTRAVENOUS at 05:02

## 2019-04-13 RX ADMIN — METRONIDAZOLE 500 MG: 500 INJECTION, SOLUTION INTRAVENOUS at 05:02

## 2019-04-13 RX ADMIN — VANCOMYCIN HYDROCHLORIDE 125 MG: KIT at 05:02

## 2019-04-13 RX ADMIN — PHENYTOIN SODIUM 200 MG: 100 CAPSULE, EXTENDED RELEASE ORAL at 09:32

## 2019-04-13 RX ADMIN — HEPARIN SODIUM 5000 UNITS: 5000 INJECTION INTRAVENOUS; SUBCUTANEOUS at 00:36

## 2019-04-13 RX ADMIN — HEPARIN SODIUM 5000 UNITS: 5000 INJECTION INTRAVENOUS; SUBCUTANEOUS at 17:22

## 2019-04-13 RX ADMIN — MIRABEGRON 25 MG: 25 TABLET, FILM COATED, EXTENDED RELEASE ORAL at 09:27

## 2019-04-13 RX ADMIN — Medication 10 ML: at 05:02

## 2019-04-13 RX ADMIN — MICONAZOLE NITRATE: 20 CREAM TOPICAL at 19:46

## 2019-04-13 RX ADMIN — HEPARIN SODIUM 5000 UNITS: 5000 INJECTION INTRAVENOUS; SUBCUTANEOUS at 09:28

## 2019-04-13 RX ADMIN — Medication 1 CAPSULE: at 09:28

## 2019-04-13 RX ADMIN — VANCOMYCIN HYDROCHLORIDE 125 MG: KIT at 11:27

## 2019-04-13 RX ADMIN — VANCOMYCIN HYDROCHLORIDE 125 MG: KIT at 17:21

## 2019-04-13 RX ADMIN — METRONIDAZOLE 500 MG: 500 INJECTION, SOLUTION INTRAVENOUS at 14:25

## 2019-04-13 RX ADMIN — METRONIDAZOLE 500 MG: 500 INJECTION, SOLUTION INTRAVENOUS at 21:55

## 2019-04-13 RX ADMIN — Medication 10 ML: at 14:25

## 2019-04-13 RX ADMIN — Medication 10 ML: at 05:01

## 2019-04-13 NOTE — PROGRESS NOTES
F/U for cdiff dx by renny     S: Ms. Debbra Dakin was seen by me today during rounds. At this time, she is resting + comfortably. According to mother she feels better. Fewer stools  The patient has no new complaints today. Please see admission consult for details of ROS; there are no other changes today. O: Blood pressure (!) 161/96, pulse 81, temperature 99 °F (37.2 °C), resp. rate 18, height 5' 5\" (1.651 m), weight 105.2 kg (232 lb), SpO2 97 %, not currently breastfeeding. Gen: Patient is in no acute distress. There is no jaundice. Lungs: Clear to auscultation bilaterally . Heart:+RRR. Abd: Soft, non tender, non-distended, bowel sounds present. Extremities: Warm. Cross sectional imaging:  None new  Lab Results   Component Value Date/Time    WBC 6.6 04/11/2019 01:37 AM    HGB 13.2 04/11/2019 01:37 AM    HCT 37.1 04/11/2019 01:37 AM    PLATELET 568 13/42/5507 01:37 AM    .1 (H) 04/11/2019 01:37 AM     Lab Results   Component Value Date/Time    Sodium 141 04/13/2019 05:01 AM    Potassium 3.3 (L) 04/13/2019 05:01 AM    Chloride 111 (H) 04/13/2019 05:01 AM    CO2 22 04/13/2019 05:01 AM    Anion gap 8 04/13/2019 05:01 AM    Glucose 99 04/13/2019 05:01 AM    BUN 3 (L) 04/13/2019 05:01 AM    Creatinine 0.45 (L) 04/13/2019 05:01 AM    BUN/Creatinine ratio 7 (L) 04/13/2019 05:01 AM    GFR est AA >60 04/13/2019 05:01 AM    GFR est non-AA >60 04/13/2019 05:01 AM    Calcium 8.3 (L) 04/13/2019 05:01 AM    Bilirubin, total 0.7 04/07/2019 08:51 PM    AST (SGOT) 43 (H) 04/07/2019 08:51 PM    Alk.  phosphatase 82 04/07/2019 08:51 PM    Protein, total 7.1 04/07/2019 08:51 PM    Albumin 3.3 (L) 04/07/2019 08:51 PM    Globulin 3.8 04/07/2019 08:51 PM    A-G Ratio 0.9 (L) 04/07/2019 08:51 PM    ALT (SGPT) 39 04/07/2019 08:51 PM        Bx:     FINAL PATHOLOGIC DIAGNOSIS   Transverse colon, biopsy:   Exudate and necrosis, see comment   Comment   This specimen consists almost entirely of exudate and necrosis with a single minute fragment of viable colonic mucosa. Histologically, this is nonspecific and could be associated with either C. difficile or ischemic colitis.      A: Active Problems:    Sepsis ([address] (4/7/2019)      Diarrhea (4/8/2019)      Colitis (4/8/2019)      NSAID long-term use (4/8/2019)      Blood in stool (4/8/2019)        Comment:  Doing better  P:  Continue what we are doing  Will follow    Claudia Hess MD  3:53 PM  4/13/2019

## 2019-04-13 NOTE — PROGRESS NOTES
Problem: Risk for Spread of Infection  Goal: Prevent transmission of infectious organism to others  Description  Prevent the transmission of infectious organisms to other patients, staff members, and visitors. Outcome: Progressing Towards Goal     Problem: Patient Education:  Go to Education Activity  Goal: Patient/Family Education  Outcome: Progressing Towards Goal     Problem: Falls - Risk of  Goal: *Absence of Falls  Description  Document Sonu Wade Fall Risk and appropriate interventions in the flowsheet. Outcome: Progressing Towards Goal  Note:   Fall Risk Interventions:  Mobility Interventions: Communicate number of staff needed for ambulation/transfer, Patient to call before getting OOB, PT Consult for mobility concerns, PT Consult for assist device competence, Strengthening exercises (ROM-active/passive), Utilize walker, cane, or other assistive device         Medication Interventions: Bed/chair exit alarm, Evaluate medications/consider consulting pharmacy, Patient to call before getting OOB, Teach patient to arise slowly    Elimination Interventions: Bed/chair exit alarm, Call light in reach, Patient to call for help with toileting needs              Problem: Patient Education: Go to Patient Education Activity  Goal: Patient/Family Education  Outcome: Progressing Towards Goal     Problem: Urinary Tract Infection - Adult  Goal: *Absence of infection signs and symptoms  Outcome: Progressing Towards Goal     Problem: Patient Education: Go to Patient Education Activity  Goal: Patient/Family Education  Outcome: Progressing Towards Goal     Problem: Pressure Injury - Risk of  Goal: *Prevention of pressure injury  Description  Document Juan Scale and appropriate interventions in the flowsheet.   Outcome: Progressing Towards Goal  Note:   Pressure Injury Interventions:  Sensory Interventions: Assess changes in LOC, Avoid rigorous massage over bony prominences, Chair cushion, Check visual cues for pain, Discuss PT/OT consult with provider, Float heels, Keep linens dry and wrinkle-free, Maintain/enhance activity level, Minimize linen layers, Pressure redistribution bed/mattress (bed type), Turn and reposition approx. every two hours (pillows and wedges if needed)    Moisture Interventions: Absorbent underpads, Limit adult briefs, Maintain skin hydration (lotion/cream), Minimize layers    Activity Interventions: Chair cushion, Increase time out of bed, Pressure redistribution bed/mattress(bed type), PT/OT evaluation    Mobility Interventions: Chair cushion, Float heels, HOB 30 degrees or less, Pressure redistribution bed/mattress (bed type), PT/OT evaluation, Turn and reposition approx.  every two hours(pillow and wedges)    Nutrition Interventions: Document food/fluid/supplement intake, Discuss nutritional consult with provider                     Problem: Patient Education: Go to Patient Education Activity  Goal: Patient/Family Education  Outcome: Progressing Towards Goal     Problem: Patient Education: Go to Patient Education Activity  Goal: Patient/Family Education  Outcome: Progressing Towards Goal

## 2019-04-13 NOTE — PROGRESS NOTES
Bedside shift change report given to Ricci Gamble RN (oncoming nurse) by Bar Gonzalez RN (offgoing nurse). Report included the following information SBAR, Kardex, Intake/Output, MAR and Recent Results.

## 2019-04-13 NOTE — PROGRESS NOTES
Hospitalist Progress Note    NAME: Wade Guillen   :  1970   MRN:  [id number]       Assessment / Plan:  Severe sepsis in the setting of acute colitis and UTI  Acute colitis, concerning for Cdiff infection given multiple abx exposure  PNA in CXR  -CXR showed PNA, however clinically pt does not have clinical symptoms of pna.  levaquin discontinued idue to severe colitis  -CT a/p showed colitis in the transverse colon. KUB done prior to colonoscopy showed transverse colon dilation up to 8cm   -stool cx and cdiff neg but Cscope on 4/10 with pseudomembranous colitis, biopsy showed exudate and necrosis  -cont' PO vancomycin and IV flagyl  -add floraq  -diet advance diet  -GI/CRS/ID consultation appreciated. No need for surgery at this time as per CRS' rec    Hx of recurrent UTI   -UA suggestive of UTI, but ucx neg  -pt recently completed 2 rounds of agumentin and 1 round of ceftin prior to admission per pt's mother  -she takes prophylactic macrobid daily for recurrent UTI, prescribed by her urologist.  Will discontinue on discharge as per ID's rec     History of seizure disorder  -seizure precaution, chronic elevation of phenytoin level, appreciate neuro's consultation   -cont' PTA seizure meds    Hypokalemia  -K low due to diarrhea, will replete with KCl  -add daily PO KCl  -monitor daily lab    History of chronic hydrocephalus  -supportive care. Pt is independent of ADLs    Obesity- Body mass index is 38.61 kg/m². Code status: Full  Prophylaxis: Hep SQ  Recommended Disposition:  PT, OT, RN     Subjective:     Chief Complaint / Reason for Physician Visit  Pt seen, loose stool, had 5 BM yesterday. No abd pain. Tolerating diet so far    Discussed with RN events overnight.      Review of Systems:  Symptom Y/N Comments  Symptom Y/N Comments   Fever/Chills n   Chest Pain     Poor Appetite    Edema     Cough    Abdominal Pain n    Sputum    Joint Pain     SOB/GOLDEN n   Pruritis/Rash     Nausea/vomit Tolerating PT/OT     Diarrhea y   Tolerating Diet     Constipation    Other       Could NOT obtain due to:      Objective:     VITALS:   Last 24hrs VS reviewed since prior progress note. Most recent are:  Patient Vitals for the past 24 hrs:   Temp Pulse Resp BP SpO2   04/13/19 0858 98.4 °F (36.9 °C) 85 18 (!) 152/98 97 %   04/13/19 0430 98.1 °F (36.7 °C) 76 18 (!) 162/101 95 %   04/13/19 0040 98.5 °F (36.9 °C) 77 19 (!) 142/91 98 %   04/12/19 1830 97.8 °F (36.6 °C) 81 -- (!) 152/102 97 %   04/12/19 1542 98.7 °F (37.1 °C) 77 20 (!) 148/96 98 %   04/12/19 1111 98.9 °F (37.2 °C) 78 18 (!) 157/100 94 %       Intake/Output Summary (Last 24 hours) at 4/13/2019 1053  Last data filed at 4/13/2019 0502  Gross per 24 hour   Intake 880 ml   Output --   Net 880 ml        PHYSICAL EXAM:  General: WD, WN. Alert, NAD  EENT:  EOMI. Anicteric sclerae. MMM  Resp:  CTA bilaterally, no wheezing or rales. No accessory muscle use  CV:  Regular  rhythm,  No edema  GI:  Soft, Non distended, Non tender.  +Bowel sounds  Neurologic:  Alert and oriented X 3, normal speech  Psych:   Fair insight. Not anxious nor agitated  Skin:  No rashes. No jaundice    Reviewed most current lab test results and cultures  YES  Reviewed most current radiology test results   YES  Review and summation of old records today    NO  Reviewed patient's current orders and MAR    YES  PMH/SH reviewed - no change compared to H&P  ________________________________________________________________________  Care Plan discussed with:    Comments   Patient x    Family  x Pt's mom   RN x    Care Manager     Consultant                        Multidiciplinary team rounds were held today with , nursing, pharmacist and clinical coordinator. Patient's plan of care was discussed; medications were reviewed and discharge planning was addressed.      ________________________________________________________________________  Total NON critical care TIME:  35 Minutes    Total CRITICAL CARE TIME Spent:   Minutes non procedure based      Comments   >50% of visit spent in counseling and coordination of care     ________________________________________________________________________  Jennifer Dean MD     Procedures: see electronic medical records for all procedures/Xrays and details which were not copied into this note but were reviewed prior to creation of Plan. LABS:  I reviewed today's most current labs and imaging studies.   Pertinent labs include:  Recent Labs     04/11/19 0137   WBC 6.6   HGB 13.2   HCT 37.1        Recent Labs     04/13/19  0501 04/12/19  0332 04/11/19 0137    141 141   K 3.3* 3.2* 3.0*   * 112* 115*   CO2 22 21 19*   GLU 99 99 84   BUN 3* 3* 5*   CREA 0.45* 0.36* 0.38*   CA 8.3* 7.8* 7.5*   MG 1.9  --   --        Signed: Jennifer Dean MD

## 2019-04-13 NOTE — PROGRESS NOTES
Initial Nutrition Assessment:    INTERVENTIONS/RECOMMENDATIONS:   · Diet advancement per primary team  · Low fiber diet until colitis resolves   · Encourage PO intake    ASSESSMENT:   Chart reviewed, medically noted for UTI, colitis and PMH shown below. Assessing pt due to LOS. Pt had nausea, vomiting and abdominal pain on admission but has resolved. Currently on a full liquid diet and pt consuming % of meals. Weight history shows no significant weight loss PTA. Probiotics added yesterday. Still having loose stools.      Past Medical History:   Diagnosis Date    Bladder incontinence     Ill-defined condition     chronic hydrocephalus; no shunt, well managed    Seizure disorder ([facility] [address]        Diet Order: Full liquids  % Eaten:    Patient Vitals for the past 72 hrs:   % Diet Eaten   04/12/19 1830 50 %   04/12/19 1542 75 %   04/12/19 0930 50 %   04/11/19 1630 100 %   04/11/19 0722 100 %   04/10/19 1500 100 %     Pertinent Medications: [x]Reviewed: lactobac, KCl,   Pertinent Labs: [x]Reviewed: K+ 3.3,   Food Allergies: [x]NKFA  []Other   Last BM: 4/12  Edema:        [x]RUE 1+  [x]LUE 1+  [x]RLE 1+  [x]LLE 1+     Pressure Injury:      [] Stage I   [] Stage II   [] Stage III   [] Stage IV      Wt Readings from Last 30 Encounters:   04/13/19 105.2 kg (232 lb)   01/24/19 102.5 kg (226 lb)   07/17/18 100.2 kg (221 lb)   01/19/18 103 kg (227 lb)   09/01/17 106.6 kg (235 lb)   07/21/17 107.5 kg (237 lb)   01/05/17 107.5 kg (237 lb)   08/31/16 105.7 kg (233 lb)   02/10/16 102.1 kg (225 lb)   12/19/14 102.1 kg (225 lb)   11/05/14 99.8 kg (220 lb)   06/13/14 105.2 kg (232 lb)   12/03/13 101.2 kg (223 lb)   05/13/13 101.2 kg (223 lb)   01/07/13 101.2 kg (223 lb)       Anthropometrics:   Height: 5' 5\" (165.1 cm) Weight: 105.2 kg (232 lb)   IBW (%IBW):   ( ) UBW (%UBW):   (  %)   Last Weight Metrics:  Weight Loss Metrics 4/13/2019 1/24/2019 7/17/2018 1/19/2018 9/1/2017 7/21/2017 1/5/2017   Today's Wt 232 lb 226 lb 221 lb 227 lb 235 lb 237 lb 237 lb   BMI 38.61 kg/m2 37.61 kg/m2 36.78 kg/m2 37.77 kg/m2 39.11 kg/m2 39.44 kg/m2 39.44 kg/m2       BMI: Body mass index is 38.61 kg/m². This BMI is indicative of:   []Underweight    []Normal    []Overweight    [x] Obesity   [] Extreme Obesity (BMI>40)     Estimated Nutrition Needs (Based on):   1850 Kcals/day(BMR: 1675 x 1.1) , 80 g(0.8 g/kg) Protein  Carbohydrate: At Least 130 g/day  Fluids: 1850 mL/day (1ml/kcal) or per primary team    NUTRITION DIAGNOSES:   Problem:  Altered GI function      Etiology: related to Colitis     Signs/Symptoms: as evidenced by MD documentation       NUTRITION INTERVENTIONS:  Meals/Snacks: General/healthful diet                  GOAL:   consume >50% of meals in 3-5 days    LEARNING NEEDS (Diet, Food/Nutrient-Drug Interaction):    [x] None Identified   [] Identified and Education Provided/Documented   [] Identified and Pt declined/was not appropriate     Cultureal, Yazidism, OR Ethnic Dietary Needs:    [x] None Identified   [] Identified and Addressed     [x] Interdisciplinary Care Plan Reviewed/Documented    [x] Discharge Planning:  General healthy diet. May need low fiber until colitis resolves     MONITORING /EVALUATION:      Food/Nutrient Intake Outcomes:  Total energy intake  Physical Signs/Symptoms Outcomes: Weight/weight change, Electrolyte and renal profile, GI, GI profile    NUTRITION RISK:    [] High              [x] Moderate           []  Low  []  Minimal/Uncompromised    PT SEEN FOR:    []  MD Consult: []Calorie Count      []Diabetic Diet Education        []Diet Education     []Electrolyte Management     []General Nutrition Management and Supplements     []Management of Tube Feeding     []TPN Recommendations    []  RN Referral:  []MST score >=2     []Enteral/Parenteral Nutrition PTA     []Pregnant: Gestational DM or Multigestation     []Pressure Ulcer/Wound Care needs        []  Low BMI  [x]  LOS Referral       Tre Ford RDN  Pager [phone]  Weekend Pager [phone]

## 2019-04-14 ENCOUNTER — HOME HEALTH ADMISSION (OUTPATIENT)
Dept: HOME HEALTH SERVICES | Facility: HOME HEALTH | Age: 49
End: 2019-04-14

## 2019-04-14 LAB
ANION GAP SERPL CALC-SCNC: 7 MMOL/L (ref 5–15)
BUN SERPL-MCNC: 4 MG/DL (ref 6–20)
BUN/CREAT SERPL: 8 (ref 12–20)
CALCIUM SERPL-MCNC: 8.1 MG/DL (ref 8.5–10.1)
CHLORIDE SERPL-SCNC: 109 MMOL/L (ref 97–108)
CO2 SERPL-SCNC: 21 MMOL/L (ref 21–32)
CREAT SERPL-MCNC: 0.49 MG/DL (ref 0.55–1.02)
GLUCOSE SERPL-MCNC: 90 MG/DL (ref 65–100)
POTASSIUM SERPL-SCNC: 3.4 MMOL/L (ref 3.5–5.1)
SODIUM SERPL-SCNC: 137 MMOL/L (ref 136–145)

## 2019-04-14 PROCEDURE — 74011250636 HC RX REV CODE- 250/636: Performed by: INTERNAL MEDICINE

## 2019-04-14 PROCEDURE — 74011250637 HC RX REV CODE- 250/637: Performed by: INTERNAL MEDICINE

## 2019-04-14 PROCEDURE — 36415 COLL VENOUS BLD VENIPUNCTURE: CPT

## 2019-04-14 PROCEDURE — 80048 BASIC METABOLIC PNL TOTAL CA: CPT

## 2019-04-14 PROCEDURE — 94760 N-INVAS EAR/PLS OXIMETRY 1: CPT

## 2019-04-14 PROCEDURE — 65660000000 HC RM CCU STEPDOWN

## 2019-04-14 PROCEDURE — 74011250637 HC RX REV CODE- 250/637: Performed by: SPECIALIST

## 2019-04-14 RX ORDER — VANCOMYCIN HYDROCHLORIDE 250 MG/5ML
125 POWDER, FOR SOLUTION ORAL EVERY 6 HOURS
Qty: 60 ML | Refills: 0 | Status: SHIPPED | OUTPATIENT
Start: 2019-04-14 | End: 2019-04-15

## 2019-04-14 RX ADMIN — MICONAZOLE NITRATE: 20 CREAM TOPICAL at 17:27

## 2019-04-14 RX ADMIN — METRONIDAZOLE 500 MG: 500 INJECTION, SOLUTION INTRAVENOUS at 14:08

## 2019-04-14 RX ADMIN — VANCOMYCIN HYDROCHLORIDE 125 MG: KIT at 00:38

## 2019-04-14 RX ADMIN — Medication 10 ML: at 14:40

## 2019-04-14 RX ADMIN — VANCOMYCIN HYDROCHLORIDE 125 MG: KIT at 12:00

## 2019-04-14 RX ADMIN — Medication 10 ML: at 05:12

## 2019-04-14 RX ADMIN — MIRABEGRON 25 MG: 25 TABLET, FILM COATED, EXTENDED RELEASE ORAL at 09:58

## 2019-04-14 RX ADMIN — BUTALBITAL, ACETAMINOPHEN AND CAFFEINE 1 TABLET: 50; 325; 40 TABLET ORAL at 17:24

## 2019-04-14 RX ADMIN — PHENYTOIN SODIUM 200 MG: 100 CAPSULE, EXTENDED RELEASE ORAL at 09:52

## 2019-04-14 RX ADMIN — HEPARIN SODIUM 5000 UNITS: 5000 INJECTION INTRAVENOUS; SUBCUTANEOUS at 08:00

## 2019-04-14 RX ADMIN — BUTALBITAL, ACETAMINOPHEN AND CAFFEINE 1 TABLET: 50; 325; 40 TABLET ORAL at 05:09

## 2019-04-14 RX ADMIN — POTASSIUM CHLORIDE 20 MEQ: 20 TABLET, EXTENDED RELEASE ORAL at 09:50

## 2019-04-14 RX ADMIN — VANCOMYCIN HYDROCHLORIDE 125 MG: KIT at 17:33

## 2019-04-14 RX ADMIN — HEPARIN SODIUM 5000 UNITS: 5000 INJECTION INTRAVENOUS; SUBCUTANEOUS at 00:38

## 2019-04-14 RX ADMIN — HEPARIN SODIUM 5000 UNITS: 5000 INJECTION INTRAVENOUS; SUBCUTANEOUS at 17:24

## 2019-04-14 RX ADMIN — Medication 10 ML: at 22:16

## 2019-04-14 RX ADMIN — Medication 10 ML: at 17:25

## 2019-04-14 RX ADMIN — FLUOXETINE HYDROCHLORIDE 20 MG: 20 CAPSULE ORAL at 09:49

## 2019-04-14 RX ADMIN — VANCOMYCIN HYDROCHLORIDE 125 MG: KIT at 05:09

## 2019-04-14 RX ADMIN — Medication 1 CAPSULE: at 09:50

## 2019-04-14 RX ADMIN — METRONIDAZOLE 500 MG: 500 INJECTION, SOLUTION INTRAVENOUS at 05:09

## 2019-04-14 RX ADMIN — METRONIDAZOLE 500 MG: 500 INJECTION, SOLUTION INTRAVENOUS at 22:15

## 2019-04-14 RX ADMIN — Medication 10 ML: at 05:11

## 2019-04-14 RX ADMIN — MICONAZOLE NITRATE: 20 CREAM TOPICAL at 09:53

## 2019-04-14 NOTE — DISCHARGE SUMMARY
Discharge Summary      Name: Angelica Hoyos  [id number]  YOB: 1970 (Age: 51 y.o.)   Date of Admission: 4/7/2019  Date of Discharge: 4/15/2019  Attending Physician: Roz Reardon MD    Discharge Diagnosis:   Severe sepsis in the setting of acute colitis and UTI  Acute colitis, concerning for Cdiff infection given multiple abx exposure  PNA in CXR   Hx of recurrent UTI   History of seizure disorder  Hypokalemia   History of chronic hydrocephalus    Consultations:  IP CONSULT TO INFECTIOUS DISEASES  IP CONSULT TO GASTROENTEROLOGY  IP CONSULT TO NEUROLOGY  IP CONSULT TO COLORECTAL SURGERY    Brief Admission History/Reason for Admission Per Tari Barba MD:   This is a 50-year-old female with past medical history of congenital hydrocephalus, seizure disorder, chronic urinary tract infection is coming to the hospital chief complaints of fever, abdominal pain, nausea and vomiting.  Patient at baseline has seizures secondary to hydrocephalus and has been on seizure medication since a very long time and she also has been having recurrent urinary tract infections since the last 5 years. Salud Jarvis was recently diagnosed with yet another urinary tract infection for which she completed 2 rounds of antibiotics including Augmentin and Ceftin.  She was also recently diagnosed to have a urine infection as well for which she was prescribed antibiotics.  About 2 days ago she started having fever along with chills and also nausea with clear vomitus.  She also started having abdominal pain which is generalized, mild, lower abdomen, without any aggravating or relieving factors.     On arrival to the hospital she was tachycardic with heart rate of 118.  On lab work he was noted to have a white count of 23,000.  Potassium was 3.0.  CT abdomen was ordered and is currently pending at this time. Salud Jarvis was given IV fluids per sepsis protocol as lactic acid was 2.1.  She was also given levofloxacin. Brief Hospital Course by Main Problems:   Severe sepsis in the setting of acute colitis and UTI  Acute colitis, concerning for Cdiff infection given multiple abx exposure  PNA in CXR  CXR showed PNA, however clinically pt does not have clinical symptoms of pna. Levaquin discontinued due to severe colitis. CT a/p showed colitis in the transverse colon. KUB done prior to colonoscopy showed transverse colon dilation up to 8cm . Stool cx and cdiff neg but Cscope on 4/10 with pseudomembranous colitis, biopsy showed exudate and necrosis. She was evaluated by CRS with no surgery intervention recommended. Pt started on IV flagyl and PO vancomycin. She improved significantly. Pt had formed stool today. Tolerating her PO intake. Pt is medically stable for discharge. Cont' tapered dose PO vancomycin to complete a course prescribed. F/u with GI as previously scheduled.     Hx of recurrent UTI, UA suggestive of UTI, but ucx neg. Pt recently completed 2 rounds of agumentin and 1 round of ceftin prior to admission per pt's mother. She takes prophylactic macrobid daily for recurrent UTI, prescribed by her urologist.  This was discontinued as per ID's recommendation. F/u with Dr Stewart Plascencia. History of seizure disorder  Seizure precaution, chronic elevation of phenytoin level, however no side effects. Appreciate neuro's consultation, Cont' PTA seizure meds    Hypokalemia, repleted.     History of chronic hydrocephalus  Supportive care. Pt is independent of ADLs     Obesity- Body mass index is 38.61 kg/m².       Discharge Exam:  Patient seen and examined by me on discharge day. Pertinent Findings:  Visit Vitals  /79 (BP 1 Location: Right arm, BP Patient Position: At rest)   Pulse 73   Temp 98.6 °F (37 °C)   Resp 18   Ht 5' 5\" (1.651 m)   Wt 102.9 kg (226 lb 13.7 oz)   SpO2 95%   Breastfeeding? No   BMI 37.75 kg/m²     Gen:    Not in distress  Chest: Clear lungs  CVS:   Regular rhythm.   No edema  Abd:  Soft, not distended, not tender    Discharge/Recent Laboratory Results:  Recent Labs     04/15/19  0521  04/13/19  0501      < > 141   K 3.4*   < > 3.3*   *   < > 111*   CO2 23   < > 22   BUN 5*   < > 3*   GLU 96   < > 99   CA 8.2*   < > 8.3*   MG  --   --  1.9    < > = values in this interval not displayed. No results for input(s): HGB, HCT, WBC, PLT, HGBEXT, HCTEXT, PLTEXT, HGBEXT, HCTEXT, PLTEXT in the last 72 hours. Discharge Medications:  Current Discharge Medication List      START taking these medications    Details   vancomycin 125 mg/2.5 mL syrg Take 125mg TID x1 week then  Take 125mg BID x1 week, then  Take 125mg daily x1 week, then  Take 125mg every 3 days x1 week. Qty: 120 mL, Refills: 0      L. acidoph & paracasei- S therm- Bifido (JONNY-Q/RISAQUAD) 8 billion cell cap cap Take 1 Cap by mouth daily. Qty: 30 Cap, Refills: 0         CONTINUE these medications which have NOT CHANGED    Details   ! ! phenytoin ER (DILANTIN ER) 100 mg ER capsule Take 200 mg by mouth daily. !! phenytoin ER (DILANTIN ER) 100 mg ER capsule Take 300 mg by mouth nightly. Take (3) 100 mg capsules along with a 30 mg capsule for a total dose of 330 mg at bedtime      ! ! phenytoin ER (DILANTIN ER) 30 mg ER capsule Take 30 mg by mouth nightly. Takes a 30 mg capsule along with (3) 100 mg capsules for a total dose of 330 mg at bedtime      topiramate ER (TROKENDI XR) 200 mg capsule Take 400 mg by mouth nightly. FLUoxetine (PROZAC) 20 mg capsule Take 20 mg by mouth nightly. mirabegron ER (MYRBETRIQ) 25 mg ER tablet Take 25 mg by mouth nightly. !! - Potential duplicate medications found. Please discuss with provider.           DISPOSITION:    Home with Family: [address] with HH/PT/OT/RN:    SNF/LTC:    MAULIK:    OTHER:          Follow up with:   PCP : Juan Acevedo MD  Follow-up Information     Follow up With Specialties Details Why Negar Grady MD [facility] In 4 days  [address] [facility]  [phone]      Mariano Servin MD Urology In 2 weeks  [address]  [address] [facility]  [phone]      Asya Li Karimewilliam [address] [facility] [address]  [phone]          Code: Full  Diet: regular    Total time in minutes spent coordinating this discharge (includes going over instructions, follow-up, prescriptions, and preparing report for sign off to her PCP) :  35 minutes

## 2019-04-14 NOTE — PROGRESS NOTES
Hospitalist Progress Note    NAME: Madelyn Olmedo   :  1970   MRN:  [id number]       Assessment / Plan:  Severe sepsis in the setting of acute colitis and UTI  Acute colitis, concerning for Cdiff infection given multiple abx exposure  PNA in CXR  -CXR showed PNA, however clinically pt does not have clinical symptoms of pna.  levaquin discontinued idue to severe colitis  -CT a/p showed colitis in the transverse colon. KUB done prior to colonoscopy showed transverse colon dilation up to 8cm   -stool cx and cdiff neg but Cscope on 4/10 with pseudomembranous colitis, biopsy showed exudate and necrosis  -con't floraq, diet advanced today.    -diet advance diet, plan for discharge in the AM if pt cont' to improved  -GI/CRS/ID consultation appreciated. Hx of recurrent UTI   -UA suggestive of UTI, but ucx neg  -pt recently completed 2 rounds of agumentin and 1 round of ceftin prior to admission per pt's mother  -she takes prophylactic macrobid daily for recurrent UTI, prescribed by her urologist.       History of seizure disorder  -seizure precaution, chronic elevation of phenytoin level, appreciate neuro's consultation   -cont' PTA seizure meds    Hypokalemia  -K low due to diarrhea, will replete with KCl  -monitor daily lab    History of chronic hydrocephalus  -supportive care. Pt is independent of ADLs    Obesity- Body mass index is 38.34 kg/m². Code status: Full  Prophylaxis: Hep SQ  Recommended Disposition:  PT, OT, RN     Subjective:     Chief Complaint / Reason for Physician Visit  Pt seen, feeling better overall. Tolerating diet, wants to advance diet today. Had 1 BM this AM, loose but not diarrhea. Denies abd pain. Discussed with RN events overnight.      Review of Systems:  Symptom Y/N Comments  Symptom Y/N Comments   Fever/Chills n   Chest Pain     Poor Appetite    Edema     Cough    Abdominal Pain n    Sputum    Joint Pain     SOB/GOLDEN n   Pruritis/Rash     Nausea/vomit    Tolerating PT/OT     Diarrhea n   Tolerating Diet     Constipation    Other       Could NOT obtain due to:      Objective:     VITALS:   Last 24hrs VS reviewed since prior progress note. Most recent are:  Patient Vitals for the past 24 hrs:   Temp Pulse Resp BP SpO2   04/14/19 1209 98.1 °F (36.7 °C) 77 18 146/84 98 %   04/14/19 0832 98.5 °F (36.9 °C) 78 18 138/89 96 %   04/14/19 0353 98.3 °F (36.8 °C) 78 18 120/69 97 %   04/14/19 0053 98.7 °F (37.1 °C) 82 16 126/83 91 %   04/13/19 1930 98.1 °F (36.7 °C) 87 18 (!) 150/96 98 %   04/13/19 1625 98.7 °F (37.1 °C) 81 20 (!) 148/96 96 %       Intake/Output Summary (Last 24 hours) at 4/14/2019 1315  Last data filed at 4/14/2019 0353  Gross per 24 hour   Intake 440 ml   Output --   Net 440 ml        PHYSICAL EXAM:  General: WD, WN. Alert, NAD  EENT:  EOMI. Anicteric sclerae. MMM  Resp:  CTA bilaterally, no wheezing or rales. No accessory muscle use  CV:  Regular  rhythm,  No edema  GI:  Soft, Non distended, Non tender.  +Bowel sounds  Neurologic:  Alert and oriented X 3, normal speech  Psych:   Fair insight. Not anxious nor agitated  Skin:  No rashes. No jaundice    Reviewed most current lab test results and cultures  YES  Reviewed most current radiology test results   YES  Review and summation of old records today    NO  Reviewed patient's current orders and MAR    YES  PMH/SH reviewed - no change compared to H&P  ________________________________________________________________________  Care Plan discussed with:    Comments   Patient x    Family  x Pt's mom   RN x    Care Manager     Consultant                        Multidiciplinary team rounds were held today with , nursing, pharmacist and clinical coordinator. Patient's plan of care was discussed; medications were reviewed and discharge planning was addressed.      ________________________________________________________________________  Total NON critical care TIME:  35   Minutes    Total CRITICAL CARE TIME Spent: Minutes non procedure based      Comments   >50% of visit spent in counseling and coordination of care     ________________________________________________________________________  Nila Garrido MD     Procedures: see electronic medical records for all procedures/Xrays and details which were not copied into this note but were reviewed prior to creation of Plan. LABS:  I reviewed today's most current labs and imaging studies. Pertinent labs include:  No results for input(s): WBC, HGB, HCT, PLT, HGBEXT, HCTEXT, PLTEXT, HGBEXT, HCTEXT, PLTEXT in the last 72 hours.   Recent Labs     04/14/19  0512 04/13/19  0501 04/12/19  0332    141 141   K 3.4* 3.3* 3.2*   * 111* 112*   CO2 21 22 21   GLU 90 99 99   BUN 4* 3* 3*   CREA 0.49* 0.45* 0.36*   CA 8.1* 8.3* 7.8*   MG  --  1.9  --        Signed: Nila Garrido MD

## 2019-04-14 NOTE — PROGRESS NOTES
Pharmacy - Clostridium Difficile (C. difficile) Monitoring     Date of Positive C. difficile:   4/8 C. Diff AG:Negative    Current C. difficile Antimicrobials:   Vancomycin 125 mg oral soln PO q6h (Start date 4/7; Day #8/14)  Metronidazole 500 mg IV Q8H (Start 4/9; Day 6    Other Antimicrobials (See Separate iVent for Details):   Levofloxacin 750 mg IV q24h (Start day: 4/7/19. Day 3)     Labs:  Recent Labs     04/14/19  0512 04/13/19  0501 04/12/19  0332   CREA 0.49* 0.45* 0.36*     No results found for: CDDNAT    C. difficile Severity: Severe CDIFF: White blood cell count of ?15,000 cells/mL OR Serum creatinine level >1.5 mg/dL     Impression/Plan: C. Diff negative, but colonoscopy with evidence of pseudomembranous colitis, so continue Vanco PO and metronidazole IV     Thank you,   Layla Scales, PHARMD    [email or web address]

## 2019-04-14 NOTE — PROGRESS NOTES
F/U for c diff  I spoke with mom    S: Ms. Kae Warner was seen by me today during rounds. At this time, she is resting + comfortably. [address] toojassi rolls--much better. No abdmoinal pain. The patient has no new complaints today. Please see admission consult for details of ROS; there are no other changes today. O: Blood pressure 138/89, pulse 78, temperature 98.5 °F (36.9 °C), resp. rate 18, height 5' 5\" (1.651 m), weight 104.5 kg (230 lb 6.1 oz), SpO2 96 %, not currently breastfeeding. Gen: Patient is in no acute distress. There is no jaundice. Lungs: Clear to auscultation bilaterally . Heart:+RRR. Abd: Soft, non tender, non-distended, bowel sounds present. Extremities: Warm. Cross sectional imaging:  None new  Lab Results   Component Value Date/Time    WBC 6.6 04/11/2019 01:37 AM    HGB 13.2 04/11/2019 01:37 AM    HCT 37.1 04/11/2019 01:37 AM    PLATELET 983 04/13/2031 01:37 AM    .1 (H) 04/11/2019 01:37 AM     Lab Results   Component Value Date/Time    Sodium 137 04/14/2019 05:12 AM    Potassium 3.4 (L) 04/14/2019 05:12 AM    Chloride 109 (H) 04/14/2019 05:12 AM    CO2 21 04/14/2019 05:12 AM    Anion gap 7 04/14/2019 05:12 AM    Glucose 90 04/14/2019 05:12 AM    BUN 4 (L) 04/14/2019 05:12 AM    Creatinine 0.49 (L) 04/14/2019 05:12 AM    BUN/Creatinine ratio 8 (L) 04/14/2019 05:12 AM    GFR est AA >60 04/14/2019 05:12 AM    GFR est non-AA >60 04/14/2019 05:12 AM    Calcium 8.1 (L) 04/14/2019 05:12 AM    Bilirubin, total 0.7 04/07/2019 08:51 PM    AST (SGOT) 43 (H) 04/07/2019 08:51 PM    Alk.  phosphatase 82 04/07/2019 08:51 PM    Protein, total 7.1 04/07/2019 08:51 PM    Albumin 3.3 (L) 04/07/2019 08:51 PM    Globulin 3.8 04/07/2019 08:51 PM    A-G Ratio 0.9 (L) 04/07/2019 08:51 PM    ALT (SGPT) 39 04/07/2019 08:51 PM        A: Active Problems:    Sepsis ([facility] [address] (4/7/2019)      Diarrhea (4/8/2019)      Colitis (4/8/2019)      NSAID long-term use (4/8/2019)      Blood in stool (4/8/2019)        Comment: This is a first episode. I have discussed 100 percent remision rate with 30 percent relapse rate  P:  Dc plans per hospitalst  10 day course of vancomycin total recommended  Patient, mom should talk to Dr Anthony Naqvi about urinary abx.     Dr. Deion Perez to see tomorrow    Elle Crawford MD  12:30 PM  4/14/2019   I

## 2019-04-14 NOTE — PROGRESS NOTES
Bedside shift change report given to Mickey Rivera RN (oncoming nurse) by Justyna Reeves RN (offgoing nurse). Report included the following information SBAR, Kardex, Procedure Summary, Intake/Output, MAR and Recent Results.

## 2019-04-14 NOTE — PROGRESS NOTES
CM sent the patient's hard script for vanc to [address] [place] [address] for cost out with the patient's insurance information. The patient will have no co-pay for her vanc prescription. CM placed the patient's hard script back on her paper chart. CM sent a referral to [address] [place] [address] for a [facility] visit and added the contact information for [facility] visit to the patient's AVS. CM will continue to follow the patient for dispo needs.      Asya Wells [address], R Michel Livingston

## 2019-04-14 NOTE — PROGRESS NOTES
Problem: Risk for Spread of Infection  Goal: Prevent transmission of infectious organism to others  Description  Prevent the transmission of infectious organisms to other patients, staff members, and visitors. Outcome: Progressing Towards Goal     Problem: Falls - Risk of  Goal: *Absence of Falls  Description  Document Bryant Shah Fall Risk and appropriate interventions in the flowsheet. Outcome: Progressing Towards Goal  Note:   Fall Risk Interventions:  Mobility Interventions: Bed/chair exit alarm, Communicate number of staff needed for ambulation/transfer, OT consult for ADLs, Patient to call before getting OOB, PT Consult for mobility concerns, PT Consult for assist device competence, Utilize walker, cane, or other assistive device         Medication Interventions: Bed/chair exit alarm, Evaluate medications/consider consulting pharmacy, Patient to call before getting OOB, Teach patient to arise slowly    Elimination Interventions: Bed/chair exit alarm, Call light in reach, Elevated toilet seat, Patient to call for help with toileting needs    History of Falls Interventions: Bed/chair exit alarm         Problem: Pressure Injury - Risk of  Goal: *Prevention of pressure injury  Description  Document Juan Scale and appropriate interventions in the flowsheet. Outcome: Progressing Towards Goal  Note:   Pressure Injury Interventions:  Sensory Interventions: Assess changes in LOC, Chair cushion, Avoid rigorous massage over bony prominences, Check visual cues for pain, Discuss PT/OT consult with provider, Float heels, Keep linens dry and wrinkle-free, Maintain/enhance activity level, Minimize linen layers, Sit a 90-degree angle/use footstool if needed, Turn and reposition approx.  every two hours (pillows and wedges if needed)    Moisture Interventions: Absorbent underpads, Apply protective barrier, creams and emollients, Check for incontinence Q2 hours and as needed, Limit adult briefs, Maintain skin hydration (lotion/cream), Minimize layers, Moisture barrier    Activity Interventions: Chair cushion, Increase time out of bed, Pressure redistribution bed/mattress(bed type), PT/OT evaluation    Mobility Interventions: Chair cushion, Float heels, HOB 30 degrees or less, Pressure redistribution bed/mattress (bed type), PT/OT evaluation, Turn and reposition approx.  every two hours(pillow and wedges)    Nutrition Interventions: Document food/fluid/supplement intake

## 2019-04-15 VITALS
BODY MASS INDEX: 37.8 KG/M2 | SYSTOLIC BLOOD PRESSURE: 141 MMHG | WEIGHT: 226.85 LBS | RESPIRATION RATE: 18 BRPM | DIASTOLIC BLOOD PRESSURE: 81 MMHG | OXYGEN SATURATION: 96 % | HEART RATE: 80 BPM | HEIGHT: 65 IN | TEMPERATURE: 98.3 F

## 2019-04-15 LAB
ANION GAP SERPL CALC-SCNC: 7 MMOL/L (ref 5–15)
BUN SERPL-MCNC: 5 MG/DL (ref 6–20)
BUN/CREAT SERPL: 10 (ref 12–20)
CALCIUM SERPL-MCNC: 8.2 MG/DL (ref 8.5–10.1)
CHLORIDE SERPL-SCNC: 111 MMOL/L (ref 97–108)
CO2 SERPL-SCNC: 23 MMOL/L (ref 21–32)
CREAT SERPL-MCNC: 0.5 MG/DL (ref 0.55–1.02)
GLUCOSE SERPL-MCNC: 96 MG/DL (ref 65–100)
POTASSIUM SERPL-SCNC: 3.4 MMOL/L (ref 3.5–5.1)
SODIUM SERPL-SCNC: 141 MMOL/L (ref 136–145)

## 2019-04-15 PROCEDURE — 74011250637 HC RX REV CODE- 250/637: Performed by: SPECIALIST

## 2019-04-15 PROCEDURE — 80048 BASIC METABOLIC PNL TOTAL CA: CPT

## 2019-04-15 PROCEDURE — 74011250636 HC RX REV CODE- 250/636: Performed by: INTERNAL MEDICINE

## 2019-04-15 PROCEDURE — 74011250637 HC RX REV CODE- 250/637: Performed by: INTERNAL MEDICINE

## 2019-04-15 PROCEDURE — 94760 N-INVAS EAR/PLS OXIMETRY 1: CPT

## 2019-04-15 PROCEDURE — 36415 COLL VENOUS BLD VENIPUNCTURE: CPT

## 2019-04-15 RX ORDER — POTASSIUM CHLORIDE 20 MEQ/1
40 TABLET, EXTENDED RELEASE ORAL
Status: COMPLETED | OUTPATIENT
Start: 2019-04-15 | End: 2019-04-15

## 2019-04-15 RX ADMIN — Medication 10 ML: at 05:20

## 2019-04-15 RX ADMIN — HEPARIN SODIUM 5000 UNITS: 5000 INJECTION INTRAVENOUS; SUBCUTANEOUS at 08:12

## 2019-04-15 RX ADMIN — POTASSIUM CHLORIDE 40 MEQ: 20 TABLET, EXTENDED RELEASE ORAL at 08:08

## 2019-04-15 RX ADMIN — VANCOMYCIN HYDROCHLORIDE 125 MG: KIT at 11:49

## 2019-04-15 RX ADMIN — FLUOXETINE HYDROCHLORIDE 20 MG: 20 CAPSULE ORAL at 08:12

## 2019-04-15 RX ADMIN — MIRABEGRON 25 MG: 25 TABLET, FILM COATED, EXTENDED RELEASE ORAL at 08:12

## 2019-04-15 RX ADMIN — METRONIDAZOLE 500 MG: 500 INJECTION, SOLUTION INTRAVENOUS at 05:20

## 2019-04-15 RX ADMIN — VANCOMYCIN HYDROCHLORIDE 125 MG: KIT at 05:20

## 2019-04-15 RX ADMIN — Medication 1 CAPSULE: at 08:12

## 2019-04-15 RX ADMIN — PHENYTOIN SODIUM 200 MG: 100 CAPSULE, EXTENDED RELEASE ORAL at 08:13

## 2019-04-15 RX ADMIN — HEPARIN SODIUM 5000 UNITS: 5000 INJECTION INTRAVENOUS; SUBCUTANEOUS at 00:01

## 2019-04-15 RX ADMIN — MICONAZOLE NITRATE: 20 CREAM TOPICAL at 08:16

## 2019-04-15 RX ADMIN — VANCOMYCIN HYDROCHLORIDE 125 MG: KIT at 00:00

## 2019-04-15 NOTE — PROGRESS NOTES
F/U for c diff  I spoke with mom    S: Ms. [facility] [address] was seen by me today during rounds. At this time, she is resting + comfortably. Stool is soft but thick. Had 3 BM's yesterday. Still no control. No abdmoinal pain. Tolerating GI lite diet. No N/V. Tentative plans for D/C home today. The patient has no new complaints today. Please see admission consult for details of ROS; there are no other changes today. O: Blood pressure 133/79, pulse 73, temperature 98.6 °F (37 °C), resp. rate 18, height 5' 5\" (1.651 m), weight 102.9 kg (226 lb 13.7 oz), SpO2 95 %, not currently breastfeeding. Gen: Patient is in no acute distress. There is no jaundice. Lungs: Clear to auscultation bilaterally . Heart:+RRR. Abd: Soft, non tender, non-distended, bowel sounds present. Extremities: Warm. Cross sectional imaging:  None new  Lab Results   Component Value Date/Time    WBC 6.6 04/11/2019 01:37 AM    HGB 13.2 04/11/2019 01:37 AM    HCT 37.1 04/11/2019 01:37 AM    PLATELET 035 55/18/7643 01:37 AM    .1 (H) 04/11/2019 01:37 AM     Lab Results   Component Value Date/Time    Sodium 141 04/15/2019 05:21 AM    Potassium 3.4 (L) 04/15/2019 05:21 AM    Chloride 111 (H) 04/15/2019 05:21 AM    CO2 23 04/15/2019 05:21 AM    Anion gap 7 04/15/2019 05:21 AM    Glucose 96 04/15/2019 05:21 AM    BUN 5 (L) 04/15/2019 05:21 AM    Creatinine 0.50 (L) 04/15/2019 05:21 AM    BUN/Creatinine ratio 10 (L) 04/15/2019 05:21 AM    GFR est AA >60 04/15/2019 05:21 AM    GFR est non-AA >60 04/15/2019 05:21 AM    Calcium 8.2 (L) 04/15/2019 05:21 AM    Bilirubin, total 0.7 04/07/2019 08:51 PM    AST (SGOT) 43 (H) 04/07/2019 08:51 PM    Alk.  phosphatase 82 04/07/2019 08:51 PM    Protein, total 7.1 04/07/2019 08:51 PM    Albumin 3.3 (L) 04/07/2019 08:51 PM    Globulin 3.8 04/07/2019 08:51 PM    A-G Ratio 0.9 (L) 04/07/2019 08:51 PM    ALT (SGPT) 39 04/07/2019 08:51 PM        A: Active Problems:    Sepsis ([facility] (4/7/2019)      Diarrhea (4/8/2019)      Colitis (4/8/2019)      NSAID long-term use (4/8/2019)      Blood in stool (4/8/2019)        Comment: This is a first episode. P:  Dc plans per hospitalst  14 day course of vancomycin followed by taper given severe case of C. Diff  Patient, mom should talk to Dr Luba Ornelas about urinary ppx. Office follow up in 2 weeks    [place], [place]  12:30 PM  4/15/2019       I have personally reviewed the history, weekend notes and discharge planning. F/u in office to decide on timing for repeat colonoscopy. Complete PO vancomycin as per protocol, with Avril Nine. Urology to decide on long term UTI tx. I have reviewed the chart and agree with the documentation recorded by the Mid Level Provider, including the assessment, treatment plan, and disposition. Michelle Funk MD

## 2019-04-15 NOTE — PROGRESS NOTES
Patient discharge home with family. Discharge instructions and prescriptions reviewed with patient and family.  IV removed prior to discharge

## 2019-04-15 NOTE — PROGRESS NOTES
D/C plans discussed with dtr who anticipates d/c to home today without d/c needs. She declines HHC; she//spouse are the pt's caregivers; she will transport; she requests Rx be faxed to \[facility]\"" Rx in [facility] which I have done; no other d/c needs assessed.

## 2019-04-15 NOTE — PROGRESS NOTES
Occupational Therapy  Orders received and medical record reviewed. Pt is preparing for discharge and has prescriptions. Pt's mother is her caregiver and declines need for [facility] therapy services at this time. Mother will call MD if she decides otherwise. Will complete the OT orders and discharge from OT.

## 2019-05-30 DIAGNOSIS — G40.309 NONINTRACTABLE GENERALIZED IDIOPATHIC EPILEPSY WITHOUT STATUS EPILEPTICUS (HCC): Primary | ICD-10-CM

## 2019-06-13 RX ORDER — BUTALBITAL, ACETAMINOPHEN AND CAFFEINE 50; 325; 40 MG/1; MG/1; MG/1
1 TABLET ORAL
COMMUNITY
End: 2022-03-08 | Stop reason: SDUPTHER

## 2019-06-13 RX ORDER — METHENAMINE HIPPURATE 1000 MG/1
1 TABLET ORAL 2 TIMES DAILY WITH MEALS
COMMUNITY

## 2019-06-14 ENCOUNTER — ANESTHESIA (OUTPATIENT)
Dept: ENDOSCOPY | Age: 49
End: 2019-06-14
Payer: MEDICARE

## 2019-06-14 ENCOUNTER — ANESTHESIA EVENT (OUTPATIENT)
Dept: ENDOSCOPY | Age: 49
End: 2019-06-14
Payer: MEDICARE

## 2019-06-14 ENCOUNTER — HOSPITAL ENCOUNTER (OUTPATIENT)
Age: 49
Setting detail: OUTPATIENT SURGERY
Discharge: HOME | End: 2019-06-14
Attending: INTERNAL MEDICINE | Admitting: INTERNAL MEDICINE
Payer: MEDICARE

## 2019-06-14 VITALS
BODY MASS INDEX: 36.82 KG/M2 | HEART RATE: 73 BPM | HEIGHT: 65 IN | WEIGHT: 221 LBS | RESPIRATION RATE: 13 BRPM | SYSTOLIC BLOOD PRESSURE: 139 MMHG | OXYGEN SATURATION: 99 % | DIASTOLIC BLOOD PRESSURE: 80 MMHG

## 2019-06-14 PROCEDURE — 76040000019: Performed by: INTERNAL MEDICINE

## 2019-06-14 PROCEDURE — 88305 TISSUE EXAM BY PATHOLOGIST: CPT

## 2019-06-14 PROCEDURE — 74011250636 HC RX REV CODE- 250/636

## 2019-06-14 PROCEDURE — 77030013992 HC SNR POLYP ENDOSC BSC -B: Performed by: INTERNAL MEDICINE

## 2019-06-14 PROCEDURE — 74011250636 HC RX REV CODE- 250/636: Performed by: INTERNAL MEDICINE

## 2019-06-14 PROCEDURE — 77030010936 HC CLP LIG BSC -C: Performed by: INTERNAL MEDICINE

## 2019-06-14 PROCEDURE — 74011250637 HC RX REV CODE- 250/637: Performed by: INTERNAL MEDICINE

## 2019-06-14 PROCEDURE — 76060000031 HC ANESTHESIA FIRST 0.5 HR: Performed by: INTERNAL MEDICINE

## 2019-06-14 RX ORDER — NALOXONE HYDROCHLORIDE 0.4 MG/ML
0.4 INJECTION, SOLUTION INTRAMUSCULAR; INTRAVENOUS; SUBCUTANEOUS
Status: DISCONTINUED | OUTPATIENT
Start: 2019-06-14 | End: 2019-06-14 | Stop reason: HOSPADM

## 2019-06-14 RX ORDER — SODIUM CHLORIDE 0.9 % (FLUSH) 0.9 %
5-40 SYRINGE (ML) INJECTION EVERY 8 HOURS
Status: DISCONTINUED | OUTPATIENT
Start: 2019-06-14 | End: 2019-06-14 | Stop reason: HOSPADM

## 2019-06-14 RX ORDER — EPINEPHRINE 0.1 MG/ML
1 INJECTION INTRACARDIAC; INTRAVENOUS
Status: DISCONTINUED | OUTPATIENT
Start: 2019-06-14 | End: 2019-06-14 | Stop reason: HOSPADM

## 2019-06-14 RX ORDER — DEXTROMETHORPHAN/PSEUDOEPHED 2.5-7.5/.8
1.2 DROPS ORAL
Status: DISCONTINUED | OUTPATIENT
Start: 2019-06-14 | End: 2019-06-14 | Stop reason: HOSPADM

## 2019-06-14 RX ORDER — LIDOCAINE HYDROCHLORIDE 20 MG/ML
INJECTION, SOLUTION EPIDURAL; INFILTRATION; INTRACAUDAL; PERINEURAL AS NEEDED
Status: DISCONTINUED | OUTPATIENT
Start: 2019-06-14 | End: 2019-06-14 | Stop reason: HOSPADM

## 2019-06-14 RX ORDER — FENTANYL CITRATE 50 UG/ML
50 INJECTION, SOLUTION INTRAMUSCULAR; INTRAVENOUS
Status: DISCONTINUED | OUTPATIENT
Start: 2019-06-14 | End: 2019-06-14 | Stop reason: HOSPADM

## 2019-06-14 RX ORDER — ATROPINE SULFATE 0.1 MG/ML
0.5 INJECTION INTRAVENOUS
Status: DISCONTINUED | OUTPATIENT
Start: 2019-06-14 | End: 2019-06-14 | Stop reason: HOSPADM

## 2019-06-14 RX ORDER — SODIUM CHLORIDE 0.9 % (FLUSH) 0.9 %
5-40 SYRINGE (ML) INJECTION AS NEEDED
Status: DISCONTINUED | OUTPATIENT
Start: 2019-06-14 | End: 2019-06-14 | Stop reason: HOSPADM

## 2019-06-14 RX ORDER — SODIUM CHLORIDE 9 MG/ML
75 INJECTION, SOLUTION INTRAVENOUS CONTINUOUS
Status: DISCONTINUED | OUTPATIENT
Start: 2019-06-14 | End: 2019-06-14 | Stop reason: HOSPADM

## 2019-06-14 RX ORDER — MIDAZOLAM HYDROCHLORIDE 1 MG/ML
.25-5 INJECTION, SOLUTION INTRAMUSCULAR; INTRAVENOUS
Status: DISCONTINUED | OUTPATIENT
Start: 2019-06-14 | End: 2019-06-14 | Stop reason: HOSPADM

## 2019-06-14 RX ORDER — FLUMAZENIL 0.1 MG/ML
0.2 INJECTION INTRAVENOUS
Status: DISCONTINUED | OUTPATIENT
Start: 2019-06-14 | End: 2019-06-14 | Stop reason: HOSPADM

## 2019-06-14 RX ORDER — PROPOFOL 10 MG/ML
INJECTION, EMULSION INTRAVENOUS AS NEEDED
Status: DISCONTINUED | OUTPATIENT
Start: 2019-06-14 | End: 2019-06-14 | Stop reason: HOSPADM

## 2019-06-14 RX ADMIN — LIDOCAINE HYDROCHLORIDE 40 MG: 20 INJECTION, SOLUTION EPIDURAL; INFILTRATION; INTRACAUDAL; PERINEURAL at 14:28

## 2019-06-14 RX ADMIN — PROPOFOL 250 MG: 10 INJECTION, EMULSION INTRAVENOUS at 14:37

## 2019-06-14 RX ADMIN — SODIUM CHLORIDE 75 ML/HR: 900 INJECTION, SOLUTION INTRAVENOUS at 14:12

## 2019-06-14 RX ADMIN — SIMETHICONE 80 MG: 20 SUSPENSION/ DROPS ORAL at 14:37

## 2019-06-14 NOTE — PERIOP NOTES
Anesthesia reports 250mg Propofol, 40mg Lidocaine and 300mL NS given during procedure. Received report from anesthesia staff on vital signs and status of patient.

## 2019-06-14 NOTE — ANESTHESIA POSTPROCEDURE EVALUATION
Procedure(s):  COLONOSCOPY  ENDOSCOPIC POLYPECTOMY  RESOLUTION CLIP x 2.    total IV anesthesia    Anesthesia Post Evaluation        Patient location during evaluation: PACU  Note status: Adequate. Level of consciousness: responsive to verbal stimuli and sleepy but conscious  Pain management: satisfactory to patient  Airway patency: patent  Anesthetic complications: no  Cardiovascular status: acceptable  Respiratory status: acceptable  Hydration status: acceptable  Comments: +Post-Anesthesia Evaluation and Assessment    Patient: Carlie Rinne MRN: [id number]  SSN: [id number]   YOB: 1970  Age: 51 y.o. Sex: female      Cardiovascular Function/Vital Signs    /80   Pulse 73   Resp 13   Ht 5' 5\" (1.651 m)   Wt 100.2 kg (221 lb)   SpO2 99%   Breastfeeding? No   BMI 36.78 kg/m²     Patient is status post Procedure(s):  COLONOSCOPY  ENDOSCOPIC POLYPECTOMY  RESOLUTION CLIP x 2. Nausea/Vomiting: Controlled. Postoperative hydration reviewed and adequate. Pain:  Pain Scale 1: Numeric (0 - 10) (06/14/19 1515)  Pain Intensity 1: 0 (06/14/19 1515)   Managed. Neurological Status: At baseline. Mental Status and Level of Consciousness: Arousable. Pulmonary Status:   O2 Device: Room air (06/14/19 1515)   Adequate oxygenation and airway patent. Complications related to anesthesia: None    Post-anesthesia assessment completed. No concerns. Signed By: Tee Harrell DO    6/14/2019  Post anesthesia nausea and vomiting:  controlled      Vitals Value Taken Time   /80 6/14/2019  3:15 PM   Temp     Pulse 73 6/14/2019  3:17 PM   Resp 16 6/14/2019  3:17 PM   SpO2 99 % 6/14/2019  3:17 PM   Vitals shown include unvalidated device data.

## 2019-06-14 NOTE — H&P
Pre-endoscopy H and P    The patient was seen and examined in the room/pre-op holding area. The airway was assessed and documented. The problem list, past medical history, and medications were reviewed. Patient Active Problem List   Diagnosis Code    Seizure ([facility] R56.9    Chronic migraine G43.709    Mental developmental delay F81.9    Severe obesity (BMI 35.0-39. 9) E66.01    Sepsis ([facility] A41.9    Diarrhea R19.7    Colitis K52.9    NSAID long-term use Z79.1    Blood in stool K92.1     Social History     Socioeconomic History    Marital status: SINGLE     Spouse name: Not on file    Number of children: Not on file    Years of education: Not on file    Highest education level: Not on file   Occupational History    Not on file   Social Needs    Financial resource strain: Not on file    Food insecurity:     Worry: Not on file     Inability: Not on file    Transportation needs:     Medical: Not on file     Non-medical: Not on file   Tobacco Use    Smoking status: Never Smoker    Smokeless tobacco: Never Used   Substance and Sexual Activity    Alcohol use: No    Drug use: Never    Sexual activity: Not on file   Lifestyle    Physical activity:     Days per week: Not on file     Minutes per session: Not on file    Stress: Not on file   Relationships    Social connections:     Talks on phone: Not on file     Gets together: Not on file     Attends Jew service: Not on file     Active member of club or organization: Not on file     Attends meetings of clubs or organizations: Not on file     Relationship status: Not on file    Intimate partner violence:     Fear of current or ex partner: Not on file     Emotionally abused: Not on file     Physically abused: Not on file     Forced sexual activity: Not on file   Other Topics Concern    Not on file   Social History Narrative    Not on file     Past Medical History:   Diagnosis Date    Arthritis     Bladder incontinence     Chronic ear infection     Ill-defined condition     chronic hydrocephalus; no shunt, well managed    Seizure disorder ([facility]          Prior to Admission Medications   Prescriptions Last Dose Informant Patient Reported? Taking? FLUoxetine (PROZAC) 20 mg capsule 6/12/2019  Yes Yes   Sig: Take 20 mg by mouth nightly. L. acidoph & paracasei- S therm- Bifido (JONNY-Q/RISAQUAD) 8 billion cell cap cap 6/12/2019  No Yes   Sig: Take 1 Cap by mouth daily. butalbital-acetaminophen-caffeine (FIORICET, ESGIC) -40 mg per tablet 6/7/2019 at Unknown time  Yes Yes   Sig: Take 1 Tab by mouth.   cranberry fruit extract (CRANBERRY PO) 6/12/2019  Yes Yes   Sig: Take 1 Tab by mouth daily. methenamine hippurate (HIPREX) 1 gram tablet 6/13/2019 at Unknown time  Yes Yes   Sig: Take 1 g by mouth two (2) times daily (with meals). mirabegron ER (MYRBETRIQ) 25 mg ER tablet 6/12/2019  Yes Yes   Sig: Take 25 mg by mouth every Monday, Wednesday, Friday. phenytoin ER (DILANTIN ER) 100 mg ER capsule 6/14/2019 at Unknown time  Yes Yes   Sig: Take 200 mg by mouth daily. phenytoin ER (DILANTIN ER) 100 mg ER capsule 6/13/2019 at Unknown time  Yes Yes   Sig: Take 330 mg by mouth nightly. Take (3) 100 mg capsules along with a 30 mg capsule for a total dose of 330 mg at bedtime    topiramate ER (TROKENDI XR) 200 mg capsule 6/13/2019 at Unknown time  Yes Yes   Sig: Take 400 mg by mouth nightly. Facility-Administered Medications: None           The review of systems is:  Negative  for shortness of breath or chest pain      The heart, lungs, and mental status were satisfactory for the administration of deep sedation and for the procedure. I discussed with the patient the objectives, risks, consequences and alternatives to the procedure.       Iván Dennison MD  6/14/2019  2:23 PM

## 2019-06-14 NOTE — ROUTINE PROCESS
Nj Montero  1970  [id number]    Situation:  Verbal report received from: Ekaterina Trinidad RN  Procedure: Procedure(s):  COLONOSCOPY  ENDOSCOPIC POLYPECTOMY  RESOLUTION CLIP x 2    Background:    Preoperative diagnosis: ENTEROCOLITIS DUE TO CLOSTRIDIUM DIFFICILE, FAM HX POLYPS, FAM HX MALIGNANT NEOPLASM  Postoperative diagnosis: Colon polyp    :  Dr. Carlos Enrique Mcguire  Assistant(s): Endoscopy Technician-1: Lavinia Doss  Endoscopy RN-1: Gui Crain RN    Specimens:   ID Type Source Tests Collected by Time Destination   1 : Sigmoid colon polyp Preservative Sigmoid  Liliana Izquierdo MD 6/14/2019 1435 Pathology   2 : Transverse colon biopsy Preservative Colon, Transverse  Liliana Izquierdo MD 6/14/2019 1440 Pathology     H. Pylori  no    Assessment:  Intra-procedure medications   Anesthesia gave intra-procedure sedation and medications, see anesthesia flow sheet yes    Intravenous fluids: NS@ KVO     Vital signs stable     Abdominal assessment: round and soft     Recommendation:  Discharge patient per MD order.   Family or Friend maeve Franklyn Rank  Permission to share finding with family or friend yes

## 2019-06-14 NOTE — ANESTHESIA PREPROCEDURE EVALUATION
Relevant Problems   No relevant active problems     Relevant Problems   No relevant active problems       Anesthetic History   No history of anesthetic complications            Review of Systems / Medical History  Patient summary reviewed, nursing notes reviewed and pertinent labs reviewed    Pulmonary  Within defined limits                 Neuro/Psych     seizures: well controlled    Headaches     Cardiovascular  Within defined limits                Exercise tolerance: >4 METS     GI/Hepatic/Renal  Within defined limits              Endo/Other        Morbid obesity     Other Findings   Comments: Mental developmental delay           Physical Exam    Airway  Mallampati: II  TM Distance: 4 - 6 cm  Neck ROM: normal range of motion   Mouth opening: Normal     Cardiovascular  Regular rate and rhythm,  S1 and S2 normal,  no murmur, click, rub, or gallop             Dental  No notable dental hx       Pulmonary  Breath sounds clear to auscultation               Abdominal  GI exam deferred       Other Findings            Anesthetic Plan    ASA: 3  Anesthesia type: total IV anesthesia and MAC          Induction: Intravenous  Anesthetic plan and risks discussed with: Patient              Anesthetic History   No history of anesthetic complications            Review of Systems / Medical History  Patient summary reviewed, nursing notes reviewed and pertinent labs reviewed    Pulmonary  Within defined limits                 Neuro/Psych     seizures: well controlled    Headaches (migraine)     Cardiovascular  Within defined limits                Exercise tolerance: >4 METS     GI/Hepatic/Renal  Within defined limits             Comments: Enterocolitis due to C Diff, fam hx of polyps, fam hx of malignant neoplasm Endo/Other        Morbid obesity and arthritis     Other Findings   Comments: Mental developmental delay  Chronic hydrocephalus, no shunt, well managed         Physical Exam    Airway  Mallampati: II  TM Distance: 4 - 6 cm  Neck ROM: normal range of motion   Mouth opening: Normal     Cardiovascular  Regular rate and rhythm,  S1 and S2 normal,  no murmur, click, rub, or gallop             Dental  No notable dental hx       Pulmonary  Breath sounds clear to auscultation               Abdominal  GI exam deferred       Other Findings            Anesthetic Plan    ASA: 3  Anesthesia type: total IV anesthesia and general          Induction: Intravenous  Anesthetic plan and risks discussed with: Patient      Propofol MAC

## 2019-06-14 NOTE — DISCHARGE INSTRUCTIONS
[place] Office: [phone]    Linn Hicks  [id number]  1970    EGD/COLONOSCOPY DISCHARGE INSTRUCTIONS  Discomfort:  Sore throat- throat lozenges or warm salt water gargle  redness at IV site- apply warm compress to area; if redness or soreness persist- contact your physician  Gaseous discomfort- walking, belching will help relieve any discomfort  You may not operate a vehicle for 12 hours  You may not engage in an occupation involving machinery or appliances for rest of today. You may not drink alcoholic beverages for at least 12 hours  Avoid making any critical decisions for at least 24 hour  DIET  You may resume your regular diet - however -  remember your colon is empty and a heavy meal will produce gas. Avoid these foods:  fried / greasy foods, excessive carbonated drinks or too much caffeine  MEDICATIONS   Regarding Aspirin or Nonsteroidal medications specifically, please see below. ACTIVITY  You may resume your normal daily activities. Spend the remainder of the day resting -  avoid any strenuous activity. CALL M.D. ANY SIGN OF   Increasing pain, nausea, vomiting  Abdominal distension (swelling)  New increased bleeding (oral or rectal)  Fever (chills)  Pain in chest area  Bloody discharge from nose or mouth  Shortness of breath    You may not take any Advil, Aspirin, Ibuprofen, Motrin, Aleve, or Goodys for 7 days, ONLY  Tylenol as needed for pain. Follow-up Instructions:   Call  Johnnie Mckinnon MD for any questions or concerns  Results of procedure / biopsy in 7 days   Telephone # [phone]      Follow-up Information    None

## 2019-06-14 NOTE — PROGRESS NOTES
Anesthesia reports *** mg Propofol, *** mg Lidocaine and *** Normal Saline were given during procedure. Received report from anesthesia staff on vital signs and status of patient.

## 2019-06-14 NOTE — PROCEDURES
Colonoscopy Procedure Note    Linn Hicks  1970  [id number]    Indications:  Please see below. Pre-operative Diagnosis: Colitis, FAM HX POLYPS, FAM HX MALIGNANT NEOPLASM    Post-operative Diagnosis: Polyp sigmoid colon      : Johnnie Mckinnon MD    Referring Provider: Berlin Lesch, MD    Sedation:  MAC anesthesia Propofol        Procedure Details:    After detailed informed consent was obtained with all risks and benefits of procedure explained and preoperative exam completed, the patient was taken to the endoscopy suite and placed in the left lateral decubitus position. Upon sequential sedation as per above, a digital rectal exam was performed  And was normal.  The Olympus videocolonoscope  was inserted in the rectum and carefully advanced to the cecum, which was identified by the ileocecal valve and appendiceal orifice. The quality of preparation was good. The colonoscope was slowly withdrawn with careful evaluation between folds. Retroflexion in the rectum was performed. Findings:   · A single, 1 cm semipedunculated sigmoid colon polyp was removed with a cold snare. A single  Resolution clip was deployed as oozing was seen post removal.  · The colitis as reported previously has healed. · I took 2 biopsies of the transverse colon: moderate oozing was noted. I then decided to close the biopsy area with a single  Resolution clip. · No other biopsies taken. · Rest of the mucosa appears normal.        Therapies:  biopsy of colon transverse colon  1 complete polypectomy  performed using cold snare  and the polyp was retrieved. 2 clips applied as above    Specimen:  Specimens were collected as described above and sent to pathology. Complications: None were encountered during the procedure. EBL:  10 ml. Recommendations:     -Await pathology.   -If adenoma is present, repeat colonoscopy in 3 years.  -Follow up with primary care physician.    -Naturally, for new bleeding, unexplained weight loss,change in bowel habits and anemia, an earlier colonoscopy should be considered. Johnnie Sanchez MD  6/14/2019  2:44 PM

## 2019-06-20 ENCOUNTER — OFFICE VISIT (OUTPATIENT)
Dept: NEUROLOGY | Age: 49
End: 2019-06-20

## 2019-06-20 VITALS
WEIGHT: 224 LBS | SYSTOLIC BLOOD PRESSURE: 126 MMHG | HEART RATE: 85 BPM | HEIGHT: 65 IN | BODY MASS INDEX: 37.32 KG/M2 | DIASTOLIC BLOOD PRESSURE: 84 MMHG | OXYGEN SATURATION: 99 %

## 2019-06-20 DIAGNOSIS — G40.209 PARTIAL SYMPTOMATIC EPILEPSY WITH COMPLEX PARTIAL SEIZURES, NOT INTRACTABLE, WITHOUT STATUS EPILEPTICUS (HCC): Primary | ICD-10-CM

## 2019-06-20 RX ORDER — DIAZEPAM 10 MG/2ML
10 GEL RECTAL
Qty: 2 KIT | Refills: 3 | Status: SHIPPED | OUTPATIENT
Start: 2019-06-20 | End: 2020-04-02 | Stop reason: SDUPTHER

## 2019-06-20 RX ORDER — EXTENDED PHENYTOIN SODIUM 30 MG/1
30 CAPSULE ORAL
Qty: 90 CAP | Refills: 3 | Status: SHIPPED | OUTPATIENT
Start: 2019-06-20 | End: 2020-07-07 | Stop reason: SDUPTHER

## 2019-06-20 RX ORDER — PHENYTOIN SODIUM 100 MG/1
CAPSULE, EXTENDED RELEASE ORAL
Qty: 450 CAP | Refills: 3 | Status: SHIPPED | OUTPATIENT
Start: 2019-06-20 | End: 2020-07-07 | Stop reason: SDUPTHER

## 2019-06-20 NOTE — PROGRESS NOTES
NEUROLOGY CLINIC NOTE    Patient ID:  Beti Vivas  [id number]  9 y.o.  1970    Date of Consultation:  June 20, 2019    Reason for Consultation:  Seizures    Chief Complaint   Patient presents with    Follow-up       History of Present Illness:     Patient Active Problem List    Diagnosis Date Noted    Diarrhea 04/08/2019    Colitis 04/08/2019    NSAID long-term use 04/08/2019    Blood in stool 04/08/2019    Sepsis ([facility] [address] 04/07/2019    Severe obesity (BMI 35.0-39.9) 07/17/2018    Chronic migraine 12/19/2014    Mental developmental delay 12/19/2014    Seizure ([facility] [address] 12/03/2013     Past Medical History:   Diagnosis Date    Arthritis     Bladder incontinence     Chronic ear infection     Ill-defined condition     chronic hydrocephalus; no shunt, well managed    Seizure disorder [facility])       Past Surgical History:   Procedure Laterality Date    COLONOSCOPY N/A 4/10/2019    COLONOSCOPY performed by Marilee Land MD at [facility] ENDOSCOPY    COLONOSCOPY N/A 6/14/2019    COLONOSCOPY performed by Tariq Ochoa MD at [facility] ENDOSCOPY    HX APPENDECTOMY      HX OTHER SURGICAL  2002    remove a lypoma      Prior to Admission medications    Medication Sig Start Date End Date Taking? Authorizing Provider   methenamine hippurate (HIPREX) 1 gram tablet Take 1 g by mouth two (2) times daily (with meals). Yes Provider, Historical   cranberry fruit extract (CRANBERRY PO) Take 1 Tab by mouth daily. Yes Provider, Historical   butalbital-acetaminophen-caffeine (FIORICET, ESGIC) -40 mg per tablet Take 1 Tab by mouth. Yes Provider, Historical   L. acidoph & paracasei- S therm- Bifido (JONNY-Q/RISAQUAD) 8 billion cell cap cap Take 1 Cap by mouth daily. 4/15/19  Yes Tank Woodard MD   phenytoin ER (DILANTIN ER) 100 mg ER capsule Take 200 mg by mouth daily. Yes Provider, Historical   phenytoin ER (DILANTIN ER) 100 mg ER capsule Take 330 mg by mouth nightly.  Take (3) 100 mg capsules along with a 30 mg capsule for a total dose of 330 mg at bedtime    Yes Provider, Historical   topiramate ER (TROKENDI XR) 200 mg capsule Take 400 mg by mouth nightly. Yes Provider, Historical   FLUoxetine (PROZAC) 20 mg capsule Take 20 mg by mouth nightly. Yes Provider, Historical   mirabegron ER (MYRBETRIQ) 25 mg ER tablet Take 25 mg by mouth every Monday, Wednesday, Friday. Yes Provider, Historical     Allergies   Allergen Reactions    Adhesive Tape-Silicones Other (comments)      Social History     Tobacco Use    Smoking status: Never Smoker    Smokeless tobacco: Never Used   Substance Use Topics    Alcohol use: No      Family History   Problem Relation Age of Onset    Heart Disease Father         Subjective:      Leticia Fong is a 51 y.o. WF with history of epilepsy, migraine headaches, obesity, arthritis and mental developmental delay who is here for follow-up for seizures. Patient was previously being seen by Dr. Verlee Severance (neurology). She was last seen 1/24/2019. Note mentions patient having a history of congenital static encephalopathy. Patient was taking Dilantin 200 mg in the morning and 330 mg at bedtime. Patient is also on Trokendi 400 mg daily. Plan then was to continue her medications. Phenytoin level was 27. Per mom and patient, she has been having increased seizures since April 20, 2019. She has had 10 seizures. Last seizure was 1 1/2 week PTC. Right arm and leg jerking with inability to respond. Lasting 3 1/2 minutes. Usual seizures are right side shakes but she is able to respond lasting 1 minute. Labs CBC and CMP done 5/30/2019 were unremarkable except for slight decrease in calcium at 8. 6.and slight increase in ALT at 33. Phenytoin level was 23.7. Review of records reveal patient was admitted at [facility] last 4/7/2019 to 4/15/2019 for sepsis in the setting of acute colitis and UTI. Neurology was consulted then for elevated phenytoin levels. No changes in regimen was made.     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 No acute intracranial abnormality. Outside reports reviewed: office notes, radiology reports, lab reports. Review of Systems:    A comprehensive review of systems was performed:   Positive for poor appetite, hearing loss, ringing in the ear, incontinence, weakness, seizure, tremors, headaches    Objective:     Visit Vitals  /84   Pulse 85   Ht 5' 5\" (1.651 m)   Wt 224 lb (101.6 kg)   SpO2 99%   BMI 37.28 kg/m²       PHYSICAL EXAM:    NEUROLOGICAL EXAM:    Appearance: The patient is well developed, well nourished, provides a coherent history and is in no acute distress. Mental Status: Oriented to time, place and person. Fluent, no aphasia or dysarthria. Mood and affect appropriate. Cranial Nerves:   Intact visual fields. DIANA, EOM's full, no nystagmus, no ptosis. Facial sensation is normal. Corneal reflexes are intact. Facial movement is symmetric. Hearing is normal bilaterally. Palate is midline with normal elevation. Sternocleidomastoid and trapezius muscles are normal. Tongue is midline. Motor:  5/5 strength. Normal bulk and tone. No fasciculations. No pronator drift. Reflexes:   Deep tendon reflexes 1+/4 and symmetrical. Downgoing toes. Sensory:   Normal to cold and vibration. Gait:  Normal gait. No Romberg. Tremor:   No tremor noted. Cerebellar:  Intact FTN/JUDY/HTS. Imaging  Brain MRI: reviewed    Labs Reviewed      Assessment:   Complex partial seizures - stable with exacerbation    Plan:   Neurological examination is nonfocal.  No new deficits. No indication currently to do any other neuroimaging. 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   No acute intracranial abnormality. Patient with known history of epilepsy. Breakthrough likely secondary to medical issues. Since that has been stable, breakthrough seizures seem to have calmed down. Breakthrough seizures are mild as well. Continue Trokendi  mg total at bedtime. Prescriptions provided. Continue Dilantin branded extended release 100 mg 2 in the morning and 3 in the evening. Continue Dilantin branded 30 mg extended release at bedtime. Prescription was provided. Discussed trial of Diastat for breakthrough seizures at home. Patient and mother agree. Prescriptions provided. Patient was advised regarding seizure precautions and lowering seizure threshold. Advised patient to sleep on time and 7-8 hours a night. Do not sleep deprived. Do not skip meals. Do not be dehydrated. Patient was also advised regarding safety. Do not do tub baths only showers. Do not work in heights unless harnessed or with supervision. Patient understood. Patient was also advised to continue calcium and vitamin D supplementation. Recheck CBC, CMP, topiramate and phenytoin levels on next visit. Advised to see a dentist at least twice a year and do oral hygiene. All questions and concerns were answered. Visit lasted 40 minutes. Greater than 50% was spent reviewing her medical records as summarized above (office visits, hospital admission, labs and neuroimaging), discussion about her condition, etiology, prognosis, seizure safety and precaution, treatment options, medication    This note was created using voice recognition software. Despite editing, there may be syntax errors.

## 2019-06-20 NOTE — LETTER
7/8/19 Patient: Petra Mars  
YOB: 1970 Date of Visit: 6/20/2019 Ventura Burgess MD 
[address] [address] VIA Facsimile: [phone] Dear Ventura Burgess MD, Thank you for referring Ms. Kulwinder Sutherland to [address] [place] [address] for evaluation. My notes for this consultation are attached. If you have questions, please do not hesitate to call me. I look forward to following your patient along with you. Sincerely, Stephen Howard MD

## 2019-06-20 NOTE — PATIENT INSTRUCTIONS
Epilepsy: Care Instructions  Your Care Instructions    Epilepsy is a common condition that causes repeated seizures. The seizures are caused by bursts of electrical activity in the brain that aren't normal. Seizures may cause problems with muscle control, movement, speech, vision, or awareness. They can be scary. Epilepsy affects each person differently. Some people have only a few seizures. Others get them more often. If you know what triggers a seizure, you may be able to avoid having one. You can take medicines to control and reduce seizures. You and your doctor will need to find the right combination, schedule, and dose of medicine. This may take time and careful changes. Seizures may get worse and happen more often over time. Follow-up care is a key part of your treatment and safety. Be sure to make and go to all appointments, and call your doctor if you are having problems. It's also a good idea to know your test results and keep a list of the medicines you take. How can you care for yourself at home? · Be safe with medicines. Take your medicines exactly as prescribed. Call your doctor if you think you are having a problem with your medicine. · Make a treatment plan with your doctor. Be sure to follow your plan. · Try to identify and avoid things that may make you more likely to have a seizure. These may include:  ? Not getting enough sleep. ? Using drugs or alcohol. ? Being emotionally stressed. ? Skipping meals. · Keep a record of any seizures you have. Note the date, time of day, and any details about the seizure that you can remember. Your doctor can use this information to plan or adjust your medicine or other treatment. · Be sure that any doctor treating you for another condition knows that you have epilepsy. Each doctor should know what medicines you are taking, if any. · Wear a medical ID bracelet. You can buy this at most [facility].  If you have a seizure that leaves you unconscious or unable to speak for yourself, this bracelet will let those who are treating you know that you have epilepsy. · Talk to your doctor about whether it is safe for you to do certain activities, such as drive or swim. When should you call for help? Call 911 anytime you think you may need emergency care. For example, call if:    · A seizure does not stop as it normally does.     · You have new symptoms such as:  ? Numbness, tingling, or weakness on one side of your body or face. ? Vision changes. ? Trouble speaking or thinking clearly.    Call your doctor now or seek immediate medical care if:    · You have a fever.     · You have a severe headache.    Watch closely for changes in your health, and be sure to contact your doctor if:    · The normal pattern or features of your seizures change. Where can you learn more? Go to http://manju-haritha.info/. Nazanin Guadalupe in the search box to learn more about \"Epilepsy: Care Instructions. \"  Current as of: Jennifer 3, 2018  Content Version: 11.9  © 5054-5626 [employer]. Care instructions adapted under license by [employer] (which disclaims liability or warranty for this information). If you have questions about a medical condition or this instruction, always ask your healthcare professional. [address] any warranty or liability for your use of this information.

## 2019-07-05 ENCOUNTER — TELEPHONE (OUTPATIENT)
Dept: NEUROLOGY | Age: 49
End: 2019-07-05

## 2019-07-05 NOTE — TELEPHONE ENCOUNTER
----- Message from Quita Weber sent at 7/5/2019 11:20 AM EDT -----  Regarding: Dr. Aquino Ahmeek [facility] requesting a call back in regards to Rx \"Depo injection\". Best contact [phone].

## 2019-07-05 NOTE — TELEPHONE ENCOUNTER
Spoke with patients mother and explained she would need to call the OBGYN for the Depo injection and I also scheduled her for her follow up in October.

## 2019-07-12 RX ORDER — HYDROCODONE BITARTRATE AND ACETAMINOPHEN 7.5; 325 MG/1; MG/1
TABLET ORAL
Refills: 0 | COMMUNITY
Start: 2019-06-15 | End: 2020-01-07 | Stop reason: ALTCHOICE

## 2019-07-12 RX ORDER — MEDROXYPROGESTERONE ACETATE 150 MG/ML
INJECTION, SUSPENSION INTRAMUSCULAR
Refills: 4 | COMMUNITY
Start: 2019-04-22

## 2019-07-12 RX ORDER — FLUTICASONE PROPIONATE 50 MCG
SPRAY, SUSPENSION (ML) NASAL
Refills: 3 | COMMUNITY
Start: 2019-05-09 | End: 2022-03-08 | Stop reason: SDUPTHER

## 2019-07-16 ENCOUNTER — TELEPHONE (OUTPATIENT)
Dept: NEUROLOGY | Age: 49
End: 2019-07-16

## 2019-07-16 NOTE — TELEPHONE ENCOUNTER
Spoke to patient mother relayed message from Dr. Mariana Tripp about not being able to fill fluoxetine ,methenamine, hippurate, and myrbetriq  She will contact her PCP to have these filled   Patient will now be getting all meds through pill pack   This has been changed in patient chart   Nurse did speak to pill pack and gave verbal orders for   [id number] mg ER   Dilantin 100 mg ER  trokendi  mg  Diazepam 5-7.5-10 mg   These were all ordered on 6/20/19 per Dr. Edil Villa

## 2019-07-29 RX ORDER — FLUOXETINE HYDROCHLORIDE 20 MG/1
20 CAPSULE ORAL
Qty: 90 CAP | Refills: 1 | Status: SHIPPED | OUTPATIENT
Start: 2019-07-29 | End: 2019-11-26 | Stop reason: SDUPTHER

## 2019-10-15 ENCOUNTER — OFFICE VISIT (OUTPATIENT)
Dept: NEUROLOGY | Age: 49
End: 2019-10-15

## 2019-10-15 VITALS
DIASTOLIC BLOOD PRESSURE: 80 MMHG | SYSTOLIC BLOOD PRESSURE: 124 MMHG | WEIGHT: 230 LBS | HEART RATE: 73 BPM | BODY MASS INDEX: 38.32 KG/M2 | HEIGHT: 65 IN | OXYGEN SATURATION: 99 %

## 2019-10-15 DIAGNOSIS — F41.9 ANXIETY: ICD-10-CM

## 2019-10-15 DIAGNOSIS — Z51.81 THERAPEUTIC DRUG MONITORING: ICD-10-CM

## 2019-10-15 DIAGNOSIS — G40.209 PARTIAL SYMPTOMATIC EPILEPSY WITH COMPLEX PARTIAL SEIZURES, NOT INTRACTABLE, WITHOUT STATUS EPILEPTICUS (HCC): Primary | ICD-10-CM

## 2019-10-15 NOTE — PATIENT INSTRUCTIONS
Epilepsy: Care Instructions  Your Care Instructions    Epilepsy is a common condition that causes repeated seizures. The seizures are caused by bursts of electrical activity in the brain that aren't normal. Seizures may cause problems with muscle control, movement, speech, vision, or awareness. They can be scary. Epilepsy affects each person differently. Some people have only a few seizures. Others get them more often. If you know what triggers a seizure, you may be able to avoid having one. You can take medicines to control and reduce seizures. You and your doctor will need to find the right combination, schedule, and dose of medicine. This may take time and careful changes. Seizures may get worse and happen more often over time. Follow-up care is a key part of your treatment and safety. Be sure to make and go to all appointments, and call your doctor if you are having problems. It's also a good idea to know your test results and keep a list of the medicines you take. How can you care for yourself at home? · Be safe with medicines. Take your medicines exactly as prescribed. Call your doctor if you think you are having a problem with your medicine. · Make a treatment plan with your doctor. Be sure to follow your plan. · Try to identify and avoid things that may make you more likely to have a seizure. These may include:  ? Not getting enough sleep. ? Using drugs or alcohol. ? Being emotionally stressed. ? Skipping meals. · Keep a record of any seizures you have. Note the date, time of day, and any details about the seizure that you can remember. Your doctor can use this information to plan or adjust your medicine or other treatment. · Be sure that any doctor treating you for another condition knows that you have epilepsy. Each doctor should know what medicines you are taking, if any. · Wear a medical ID bracelet. You can buy this at most [employer].  If you have a seizure that leaves you unconscious or unable to speak for yourself, this bracelet will let those who are treating you know that you have epilepsy. · Talk to your doctor about whether it is safe for you to do certain activities, such as drive or swim. When should you call for help? Call 911 anytime you think you may need emergency care. For example, call if:    · A seizure does not stop as it normally does.     · You have new symptoms such as:  ? Numbness, tingling, or weakness on one side of your body or face. ? Vision changes. ? Trouble speaking or thinking clearly.    Call your doctor now or seek immediate medical care if:    · You have a fever.     · You have a severe headache.    Watch closely for changes in your health, and be sure to contact your doctor if:    · The normal pattern or features of your seizures change. Where can you learn more? Go to http://manju-haritha.info/. Tin Rodriguez in the search box to learn more about \"Epilepsy: Care Instructions. \"  Current as of: March 28, 2019  Content Version: 12.2  © 5071-7889 [employer]. Care instructions adapted under license by [employer] (which disclaims liability or warranty for this information). If you have questions about a medical condition or this instruction, always ask your healthcare professional. [address] any warranty or liability for your use of this information.

## 2019-10-15 NOTE — LETTER
10/15/19 Patient: Ester Baker  
YOB: 1970 Date of Visit: 10/15/2019 Kaela De La Torre MD 
[address] [address] VIA Facsimile: [phone] Dear Kaela De La Torre MD, Thank you for referring Ms. Adolfo Pena to [address] [place] [address] for evaluation. My notes for this consultation are attached. If you have questions, please do not hesitate to call me. I look forward to following your patient along with you. Sincerely, Sneha Morales MD

## 2019-10-15 NOTE — PROGRESS NOTES
NEUROLOGY CLINIC NOTE    Patient ID:  Abeba Cooper  [id number]  74 y.o.  1970    Date of Visit:  October 15, 2019    Reason for Visit:  Seizures, anxiety    Chief Complaint   Patient presents with    Follow-up       History of Present Illness:     Patient Active Problem List    Diagnosis Date Noted    Diarrhea 04/08/2019    Colitis 04/08/2019    NSAID long-term use 04/08/2019    Blood in stool 04/08/2019    Sepsis ([facility] 04/07/2019    Severe obesity (BMI 35.0-39.9) 07/17/2018    Chronic migraine 12/19/2014    Mental developmental delay 12/19/2014    Seizure ([facility] [address] 12/03/2013     Past Medical History:   Diagnosis Date    Arthritis     Bladder incontinence     Chronic ear infection     Ill-defined condition     chronic hydrocephalus; no shunt, well managed    Seizure disorder [facility])       Past Surgical History:   Procedure Laterality Date    COLONOSCOPY N/A 4/10/2019    COLONOSCOPY performed by Kelton Mistry MD at [facility] ENDOSCOPY    COLONOSCOPY N/A 6/14/2019    COLONOSCOPY performed by Alva Yao MD at [facility] ENDOSCOPY    HX APPENDECTOMY      HX OTHER SURGICAL  2002    remove a lypoma      Prior to Admission medications    Medication Sig Start Date End Date Taking? Authorizing Provider   FLUoxetine (PROZAC) 20 mg capsule Take 1 Cap by mouth nightly. 7/29/19  Yes Erica Bautista MD   fluticasone propionate (FLONASE) 50 mcg/actuation nasal spray USE 2 SPRAYS IN EACH NOSTRIL QD FOR ALLERGIES 5/9/19  Yes Provider, Historical   medroxyPROGESTERone (DEPO-PROVERA) 150 mg/mL injection INJECT 150MG INTO THE MUSCLE Q 12 WEEKS 4/22/19  Yes Provider, Historical   DILANTIN 30 mg ER capsule Take 1 Cap by mouth nightly. Added to 300 mg at bedtime. Dilantin brand medically necessary. 6/20/19  Yes Erica Bautista MD   topiramate ER (TROKENDI XR) 200 mg capsule Take 2 Caps by mouth nightly.  6/20/19  Yes Erica Bautista MD   phenytoin ER (DILANTIN EXTENDED) 100 mg ER capsule Take 2 caps in am and 3 caps at bedtime. Dilantin brand medically necessary. 6/20/19  Yes Scott Garcia MD   diazePAM (DIASTAT ACUDIAL) 5-7.5-10 mg kit Insert 10 mg into rectum daily as needed (seizures). 6/20/19  Yes Scott Garcia MD   methenamine hippurate (HIPREX) 1 gram tablet Take 1 g by mouth two (2) times daily (with meals). Yes Provider, Historical   cranberry fruit extract (CRANBERRY PO) Take 1 Tab by mouth daily. Yes Provider, Historical   butalbital-acetaminophen-caffeine (FIORICET, ESGIC) -40 mg per tablet Take 1 Tab by mouth. Yes Provider, Historical   L. acidoph & paracasei- S therm- Bifido (JONNY-Q/RISAQUAD) 8 billion cell cap cap Take 1 Cap by mouth daily. 4/15/19  Yes Marilu Mata MD   mirabegron ER [facility]) 25 mg ER tablet Take 25 mg by mouth every Monday, Wednesday, Friday. Yes Provider, Historical   HYDROcodone-acetaminophen (NORCO) 7.5-325 mg per tablet TAKE 1 TABLET BY MOUTH EVERY 6 HOURS AS NEEDED FOR MODERATE PAIN (PSR 4 6) 6/15/19   Provider, Historical     Allergies   Allergen Reactions    Adhesive Tape-Silicones Other (comments)      Social History     Tobacco Use    Smoking status: Never Smoker    Smokeless tobacco: Never Used   Substance Use Topics    Alcohol use: No      Family History   Problem Relation Age of Onset    Heart Disease Father         Subjective:      Alfonso Alstno is a 51 y.o. WF with history of epilepsy, migraine headaches, obesity, arthritis and mental developmental delay who is here for follow-up for seizures. Patient was previously being seen by Dr. Dominick Ritchie (neurology). She was last seen 1/24/2019. Note mentions patient having a history of congenital static encephalopathy. Patient was taking Dilantin 200 mg in the morning and 330 mg at bedtime. Patient is also on Trokendi 400 mg daily. Plan then was to continue her medications. Phenytoin level was 27.      Per mom and patient, she has been having increased seizures since April 20, 2019. She has had 10 seizures. Last seizure was 1 1/2 week PTC. Right arm and leg jerking with inability to respond. Lasting 3 1/2 minutes. Usual seizures are right side shakes but she is able to respond lasting 1 minute. Labs CBC and CMP done 5/30/2019 were unremarkable except for slight decrease in calcium at 8. 6.and slight increase in ALT at 33. Phenytoin level was 23.7. Review of records reveal patient was admitted at [facility] last 4/7/2019 to 4/15/2019 for sepsis in the setting of acute colitis and UTI. Neurology was consulted then for elevated phenytoin levels. No changes in regimen was made. 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 No acute intracranial abnormality. Since the last visit on 6/20/2019, patient has only had 2 breakthrough seizures. Last was 2 weeks PTC. Mild focal right side shaking lasting 30 seconds to 1 minute. No loss of consciousness. She continues to take Trokendi  mg, 2 daily. She also takes branded Dilantin 200 mg in the morning and 330 mg at bedtime. Denies any side effects. She continues to take Prozac 20 mg at bedtime for anxiety. This continues to offer benefit. Denies any side effects. Outside reports reviewed: none    Review of Systems:    A comprehensive review of systems was performed:   Positive for hoarseness, congestion, incontinence, muscle aches, weakness, back pain, headaches    Objective:     Visit Vitals  /80   Pulse 73   Ht 5' 5\" (1.651 m)   Wt 230 lb (104.3 kg)   SpO2 99%   BMI 38.27 kg/m²       PHYSICAL EXAM:    NEUROLOGICAL EXAM:    Appearance: The patient is well developed, well nourished, provides a coherent history and is in no acute distress. Mental Status: Oriented to time, place and person. Fluent, no aphasia or dysarthria. Mood and affect appropriate.    Cranial Nerves:   II - XII were intact. Motor:  5/5 strength. Normal bulk and tone. No pronator drift. Reflexes:   Deep tendon reflexes 1+/4 and symmetrical. Downgoing toes. Sensory:   Normal to cold and vibration. Gait:  Normal gait. No Romberg. Tremor:   No tremor noted. Cerebellar:  Intact FTN/JUDY/HTS. Assessment:   Complex partial seizures - stable with exacerbation  Anxiety  Therapeutic drug monitoring    Plan:   Neurological examination is unchanged. It is still nonfocal.  No new deficits. No indication currently to do any other neuroimaging. 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 No acute intracranial abnormality. Patient with known history of epilepsy. Breakthrough likely secondary to medical issues. Breakthrough seizures are mild. Continue Trokendi  mg total at bedtime. Continue Dilantin branded extended release 100 mg 2 in the morning and 3 in the evening. Continue Dilantin branded 30 mg extended release at bedtime. Continue Diastat for breakthrough seizures at home. Patient was advised regarding seizure precautions and lowering seizure threshold. Advised patient to sleep on time and 7-8 hours a night. Do not sleep deprived. Do not skip meals. Do not be dehydrated. Patient was also advised regarding safety. Do not do tub baths only showers. Do not work in heights unless harnessed or with supervision. Patient understood. Patient was also advised to continue calcium and vitamin D supplementation. Stable issues with anxiety. Continue Prozac 20 mg at bedtime. Prescriptions previously provided. Check CBC, CMP, topiramate and free/total phenytoin levels today. Advised to see a dentist at least twice a year and do oral hygiene. All questions and concerns were answered. This note was created using voice recognition software.  Despite editing, there may be syntax errors.

## 2019-10-17 LAB
ALBUMIN SERPL-MCNC: 4.4 G/DL (ref 3.5–5.5)
ALBUMIN/GLOB SERPL: 1.8 {RATIO} (ref 1.2–2.2)
ALP SERPL-CCNC: 93 IU/L (ref 39–117)
ALT SERPL-CCNC: 29 IU/L (ref 0–32)
AST SERPL-CCNC: 26 IU/L (ref 0–40)
BASOPHILS # BLD AUTO: 0 X10E3/UL (ref 0–0.2)
BASOPHILS NFR BLD AUTO: 1 %
BILIRUB SERPL-MCNC: 0.3 MG/DL (ref 0–1.2)
BUN SERPL-MCNC: 11 MG/DL (ref 6–24)
BUN/CREAT SERPL: 15 (ref 9–23)
CALCIUM SERPL-MCNC: 9.3 MG/DL (ref 8.7–10.2)
CHLORIDE SERPL-SCNC: 104 MMOL/L (ref 96–106)
CO2 SERPL-SCNC: 21 MMOL/L (ref 20–29)
CREAT SERPL-MCNC: 0.72 MG/DL (ref 0.57–1)
EOSINOPHIL # BLD AUTO: 0.1 X10E3/UL (ref 0–0.4)
EOSINOPHIL NFR BLD AUTO: 2 %
ERYTHROCYTE [DISTWIDTH] IN BLOOD BY AUTOMATED COUNT: 13.4 % (ref 12.3–15.4)
GLOBULIN SER CALC-MCNC: 2.5 G/DL (ref 1.5–4.5)
GLUCOSE SERPL-MCNC: 91 MG/DL (ref 65–99)
HCT VFR BLD AUTO: 45.8 % (ref 34–46.6)
HGB BLD-MCNC: 15.8 G/DL (ref 11.1–15.9)
IMM GRANULOCYTES # BLD AUTO: 0 X10E3/UL (ref 0–0.1)
IMM GRANULOCYTES NFR BLD AUTO: 0 %
LYMPHOCYTES # BLD AUTO: 1.9 X10E3/UL (ref 0.7–3.1)
LYMPHOCYTES NFR BLD AUTO: 31 %
MCH RBC QN AUTO: 35.9 PG (ref 26.6–33)
MCHC RBC AUTO-ENTMCNC: 34.5 G/DL (ref 31.5–35.7)
MCV RBC AUTO: 104 FL (ref 79–97)
MONOCYTES # BLD AUTO: 0.7 X10E3/UL (ref 0.1–0.9)
MONOCYTES NFR BLD AUTO: 11 %
NEUTROPHILS # BLD AUTO: 3.5 X10E3/UL (ref 1.4–7)
NEUTROPHILS NFR BLD AUTO: 55 %
PHENYTOIN FREE SERPL-MCNC: 1.5 UG/ML (ref 1–2)
PHENYTOIN SERPL-MCNC: 23.4 UG/ML (ref 10–20)
PLATELET # BLD AUTO: 203 X10E3/UL (ref 150–450)
POTASSIUM SERPL-SCNC: 4 MMOL/L (ref 3.5–5.2)
PROT SERPL-MCNC: 6.9 G/DL (ref 6–8.5)
RBC # BLD AUTO: 4.4 X10E6/UL (ref 3.77–5.28)
SODIUM SERPL-SCNC: 141 MMOL/L (ref 134–144)
TOPIRAMATE SERPL-MCNC: 3.7 UG/ML (ref 2–25)
WBC # BLD AUTO: 6.2 X10E3/UL (ref 3.4–10.8)

## 2019-11-26 RX ORDER — FLUOXETINE HYDROCHLORIDE 20 MG/1
20 CAPSULE ORAL
Qty: 90 CAP | Refills: 0 | Status: SHIPPED | OUTPATIENT
Start: 2019-11-26 | End: 2020-02-26

## 2020-01-07 ENCOUNTER — OFFICE VISIT (OUTPATIENT)
Dept: FAMILY MEDICINE CLINIC | Age: 50
End: 2020-01-07

## 2020-01-07 VITALS
RESPIRATION RATE: 20 BRPM | HEART RATE: 78 BPM | WEIGHT: 230 LBS | DIASTOLIC BLOOD PRESSURE: 84 MMHG | BODY MASS INDEX: 38.32 KG/M2 | HEIGHT: 65 IN | SYSTOLIC BLOOD PRESSURE: 120 MMHG | OXYGEN SATURATION: 98 % | TEMPERATURE: 98.1 F

## 2020-01-07 DIAGNOSIS — Z86.69 HISTORY OF RECURRENT EAR INFECTION: ICD-10-CM

## 2020-01-07 DIAGNOSIS — R53.1 RIGHT SIDED WEAKNESS: ICD-10-CM

## 2020-01-07 DIAGNOSIS — F81.9 MENTAL DEVELOPMENTAL DELAY: ICD-10-CM

## 2020-01-07 DIAGNOSIS — N39.0 CHRONIC UTI: ICD-10-CM

## 2020-01-07 DIAGNOSIS — R56.9 SEIZURE (HCC): ICD-10-CM

## 2020-01-07 DIAGNOSIS — F41.9 ANXIETY: ICD-10-CM

## 2020-01-07 DIAGNOSIS — N39.46 MIXED STRESS AND URGE URINARY INCONTINENCE: ICD-10-CM

## 2020-01-07 DIAGNOSIS — Z76.89 ENCOUNTER TO ESTABLISH CARE: Primary | ICD-10-CM

## 2020-01-07 NOTE — PROGRESS NOTES
Chief Complaint   Patient presents with   [facility] Maintenance reviewed     1. Have you been to the ER, urgent care clinic since your last visit? Hospitalized since your last visit? No    2. Have you seen or consulted any other health care providers outside of the [address] [place] [address] since your last visit? Include any pap smears or colon screening.  No

## 2020-01-07 NOTE — PROGRESS NOTES
Subjective:     Chief Complaint   Patient presents with    Establish Care        HPI:  Vernell Gonzalez is a 47 y.o. female presents to [facility], former PCP Dr Rimma Velazquez who has recently retired. Seizure disorder:  Followed by Dr Tessa Calzada. Had one seizure in October and one in November and one just before [place], which mom reports \"is really good compared to what she was having, mostly if she is overly tired or sick\". Mother reports that since she has been on Depo shot she has been doing much better, used to have a lot of seizures when she was having periods. Has congenital right sided weakness and mother says that if she gets really tired then she gets weak and will start tripping and then uses walker PRN. Will have follow up with neurology in April. Per Tessa Calzada note 10/5/19:  She continues to take Trokendi  mg, 2 daily. She also takes branded Dilantin 200 mg in the morning and 330 mg at bedtime. Denies any side effects. She continues to take Prozac 20 mg at bedtime for anxiety. This continues to offer benefit. Denies any side effects        Followed by Dr Suha Still, GYN. Says she is UTD on pap smear. Gets mammogram done at [facility]. She has some urinary incontinence. She has had recurrent UTI as well, followed by Dr Glen Willis at [facility]. Seen by him every 6 months. Taking Hiprex 1 gm BID which has really helped prevent UTI per mother. Recurrent ear infection:  Followed by Dr Maura Spain, yearly and PRN. Has never had tubes in her ears but mother says she was told that she may need them in future. Mom says that she was treated for ear infection and sinusitis in December. She denies any pain now. Mom says that she puts swimmer ear drops in her ear about three times per week to help. She lives with mother and father. Does not work; Does not drive. She is active at home with house hold simple chores. She is alert and oriented, speaks well. Patient Active Problem List    Diagnosis Date Noted    Diarrhea 04/08/2019    Colitis 04/08/2019    NSAID long-term use 04/08/2019    Blood in stool 04/08/2019    Sepsis ([facility] 04/07/2019    Severe obesity (BMI 35.0-39.9) 07/17/2018    Chronic migraine 12/19/2014    Mental developmental delay 12/19/2014    Seizure ([facility] 12/03/2013         Past Medical History:   Diagnosis Date    Arthritis     Bladder incontinence     Chronic ear infection     Ill-defined condition     chronic hydrocephalus; no shunt, well managed    Seizure disorder ([facility]        Social History     Tobacco Use    Smoking status: Never Smoker    Smokeless tobacco: Never Used   Substance Use Topics    Alcohol use: No    Drug use: Never       Outpatient Medications Marked as Taking for the 1/7/20 encounter (Office Visit) with Jacy Kennedy NP   Medication Sig Dispense Refill    FLUoxetine (PROZAC) 20 mg capsule Take 1 Cap by mouth nightly. 90 Cap 0    fluticasone propionate (FLONASE) 50 mcg/actuation nasal spray USE 2 SPRAYS IN EACH NOSTRIL QD FOR ALLERGIES  3    medroxyPROGESTERone (DEPO-PROVERA) 150 mg/mL injection INJECT 150MG INTO THE MUSCLE Q 12 WEEKS  4    DILANTIN 30 mg ER capsule Take 1 Cap by mouth nightly. Added to 300 mg at bedtime. Dilantin brand medically necessary. 90 Cap 3    topiramate ER (TROKENDI XR) 200 mg capsule Take 2 Caps by mouth nightly. 180 Cap 3    phenytoin ER (DILANTIN EXTENDED) 100 mg ER capsule Take 2 caps in am and 3 caps at bedtime. Dilantin brand medically necessary. 450 Cap 3    diazePAM (DIASTAT ACUDIAL) 5-7.5-10 mg kit Insert 10 mg into rectum daily as needed (seizures). 2 Kit 3    methenamine hippurate (HIPREX) 1 gram tablet Take 1 g by mouth two (2) times daily (with meals).  cranberry fruit extract (CRANBERRY PO) Take 1 Tab by mouth daily.  butalbital-acetaminophen-caffeine (FIORICET, ESGIC) -40 mg per tablet Take 1 Tab by mouth.       L. acidoph & paracasei- S therm- Bifido (JONNY-Q/RISAQUAD) 8 billion cell cap cap Take 1 Cap by mouth daily. 30 Cap 0    mirabegron ER (MYRBETRIQ) 25 mg ER tablet Take 25 mg by mouth every Monday, Wednesday, Friday. Allergies   Allergen Reactions    Adhesive Tape-Silicones Other (comments)    Medrol [Methylprednisolone] Anxiety and Other (comments)       Health Maintenance reviewed -    ROS:  Gen: no fatigue, no fever, no chills, no unexplained weight loss or weight gain  Eyes: no excessive tearing, itching, or discharge  Nose: no rhinorrhea, no sinus pain  Mouth: no oral lesions, no sore throat, no difficulty swallowing  Resp: no shortness of breath, no wheezing, no cough  CV: no chest pain, no orthopnea, no paroxysmal nocturnal dyspnea, no lower extremity edema, no palpitations  Abd: no nausea, no heartburn, no diarrhea, no constipation, no abdominal pain  Neuro: no headaches, no syncope or presyncopal episodes  Endo: no polyuria, no polydipsia. : no hematuria, no dysuria, no frequency, no incontinence  Heme: no lymphadenopathy, no easy bruising or bleeding, no night sweats  MSK: no joint pain or swelling    PE:  Visit Vitals  /84 (BP 1 Location: Left arm, BP Patient Position: Sitting)   Pulse 78   Temp 98.1 °F (36.7 °C) (Oral)   Resp 20   Ht 5' 5\" (1.651 m)   Wt 230 lb (104.3 kg)   SpO2 98%   BMI 38.27 kg/m²     Gen: alert, oriented, no acute distress  Head: normocephalic, atraumatic  Ears: external auditory canals clear, TMs without erythema or effusion  Eyes: pupils equal round reactive to light, sclera clear, conjunctiva clear  Oral: moist mucus membranes, no oral lesions, no pharyngeal inflammation or exudate  Neck: symmetric normal sized thyroid, no carotid bruits, no jugular vein distention  Resp: no increase work of breathing, lungs clear to ausculation bilaterally, no wheezing, rales or rhonchi  CV: S1, S2 normal.  No murmurs, rubs, or gallops. Abd: soft, not tender, not distended. No hepatosplenomegaly. Normal bowel sounds. No hernias. No abdominal or renal bruits. Neuro: cranial nerves intact, normal movement in all extremities, and sensation intact and symmetric. Skin: no lesion or rash  Extremities: no cyanosis or edema    No results found for this visit on 01/07/20. Assessment/Plan:  Differential diagnosis and treatment options reviewed with patient who is in agreement with treatment plan as outlined below. ICD-10-CM ICD-9-CM    1. Encounter to establish care Z76.89 V65.8    2. Seizure ([address] R56.9 780.39    3. Mental developmental delay F81.9 315.9    4. Chronic migraine G43.709 346.70    5. Chronic UTI N39.0 599.0    6. Mixed stress and urge urinary incontinence N39.46 788.33    7. Right sided weakness R53.1 728.87    8. Anxiety F41.9 300.00    9. History of recurrent ear infection Z86.69 V12.49      Pleasant and interactive, she appears to be high functioning developmental delay, takes care of most of her ADLs (mom helps with medications and meals and patient does not drive). Will request records from specialists. She will continue to follow with specialists. Do not have old medical records from last PCP yet. Discussed BMI and healthy weight. Encouraged patient to work to implement changes including diet high in raw fruits and vegetables, lean protein and good fats. Limit refined, processed carbohydrates and sugar. Encouraged regular exercise. Follow up 3 months or sooner if needed. I have discussed the diagnosis with the patient and the intended plan as seen in the above orders. The patient has received an after-visit summary and questions were answered concerning future plans. I have discussed medication side effects and warnings with the patient as well. The patient verbalizes understanding and agreement with the plan.

## 2020-02-26 RX ORDER — FLUOXETINE HYDROCHLORIDE 20 MG/1
20 CAPSULE ORAL
Qty: 90 CAP | Refills: 0 | Status: SHIPPED | OUTPATIENT
Start: 2020-02-26 | End: 2020-05-26

## 2020-02-27 ENCOUNTER — TELEPHONE (OUTPATIENT)
Dept: NEUROLOGY | Age: 50
End: 2020-02-27

## 2020-03-19 ENCOUNTER — TELEPHONE (OUTPATIENT)
Dept: NEUROLOGY | Age: 50
End: 2020-03-19

## 2020-03-19 ENCOUNTER — OFFICE VISIT (OUTPATIENT)
Dept: FAMILY MEDICINE CLINIC | Age: 50
End: 2020-03-19

## 2020-03-19 VITALS
HEART RATE: 74 BPM | OXYGEN SATURATION: 97 % | DIASTOLIC BLOOD PRESSURE: 73 MMHG | TEMPERATURE: 98.4 F | BODY MASS INDEX: 37.82 KG/M2 | WEIGHT: 227 LBS | RESPIRATION RATE: 16 BRPM | SYSTOLIC BLOOD PRESSURE: 117 MMHG | HEIGHT: 65 IN

## 2020-03-19 DIAGNOSIS — D17.1 LIPOMA OF ABDOMINAL WALL: ICD-10-CM

## 2020-03-19 DIAGNOSIS — R10.9 ABDOMINAL PAIN, UNSPECIFIED ABDOMINAL LOCATION: Primary | ICD-10-CM

## 2020-03-19 LAB
BILIRUB UR QL STRIP: NEGATIVE
GLUCOSE UR-MCNC: NEGATIVE MG/DL
KETONES P FAST UR STRIP-MCNC: NEGATIVE MG/DL
PH UR STRIP: 7 [PH] (ref 4.6–8)
PROT UR QL STRIP: NEGATIVE
SP GR UR STRIP: 1.02 (ref 1–1.03)
UA UROBILINOGEN AMB POC: NORMAL (ref 0.2–1)
URINALYSIS CLARITY POC: CLEAR
URINALYSIS COLOR POC: YELLOW
URINE BLOOD POC: NEGATIVE
URINE LEUKOCYTES POC: NEGATIVE
URINE NITRITES POC: NEGATIVE

## 2020-03-19 NOTE — PROGRESS NOTES
Subjective:     Chief Complaint   Patient presents with    Side Pain    Abdominal Pain        HPI:  Fior Hartley is a 47 y.o. female here for complaints of left sided abdominal pain/side pain. Says that she has history of lipomas to that side and she has had to have surgically removed in the past.  This discomfort is coming and going for the past 3 weeks. Sometimes burning and sometimes dull pain. Worse at night because she lays on that left side when she sleeps. She says that she can feel little \"lumps which is just like it was last time when the lipoma was needing to be removed\". She was seen by Dr Elsa Shabazz at [facility] (surgery) who removed the last lipoma  Mother says that Dr Elsa Shabazz did tell her that he did not remove the entire lipoma last time \"because there were nerves in there that was in the way\". She has tried taking tylenol but not much relief. No nausea, vomiting or diarrhea. Having normal bowel movements. She is reluctant to do any \"nerve\" type medication secondary to all her seizure medications. She notes that compressing the area helps. No hospital, ER or specialist visits since last primary care visit except as noted above. Past Medical History:   Diagnosis Date    Arthritis     Bladder incontinence     Chronic ear infection     Ill-defined condition     chronic hydrocephalus; no shunt, well managed    Seizure disorder (HCC)        Social History     Tobacco Use    Smoking status: Never Smoker    Smokeless tobacco: Never Used   Substance Use Topics    Alcohol use: No    Drug use: Never       Outpatient Medications Marked as Taking for the 3/19/20 encounter (Office Visit) with Jacy Kennedy NP   Medication Sig Dispense Refill    FLUoxetine (PROZAC) 20 mg capsule Take 1 Cap by mouth nightly.  90 Cap 0    fluticasone propionate (FLONASE) 50 mcg/actuation nasal spray USE 2 SPRAYS IN EACH NOSTRIL QD FOR ALLERGIES  3    medroxyPROGESTERone (DEPO-PROVERA) 150 mg/mL injection INJECT 150MG INTO THE MUSCLE Q 12 WEEKS  4    DILANTIN 30 mg ER capsule Take 1 Cap by mouth nightly. Added to 300 mg at bedtime. Dilantin brand medically necessary. 90 Cap 3    topiramate ER (TROKENDI XR) 200 mg capsule Take 2 Caps by mouth nightly. 180 Cap 3    phenytoin ER (DILANTIN EXTENDED) 100 mg ER capsule Take 2 caps in am and 3 caps at bedtime. Dilantin brand medically necessary. 450 Cap 3    diazePAM (DIASTAT ACUDIAL) 5-7.5-10 mg kit Insert 10 mg into rectum daily as needed (seizures). 2 Kit 3    methenamine hippurate (HIPREX) 1 gram tablet Take 1 g by mouth two (2) times daily (with meals).  cranberry fruit extract (CRANBERRY PO) Take 1 Tab by mouth daily.  butalbital-acetaminophen-caffeine (FIORICET, ESGIC) -40 mg per tablet Take 1 Tab by mouth.  L. acidoph & paracasei- S therm- Bifido (JONNY-Q/RISAQUAD) 8 billion cell cap cap Take 1 Cap by mouth daily. 30 Cap 0    mirabegron ER (MYRBETRIQ) 25 mg ER tablet Take 25 mg by mouth every Monday, Wednesday, Friday. Allergies   Allergen Reactions    Adhesive Tape-Silicones Other (comments)    Medrol [Methylprednisolone] Anxiety and Other (comments)       Health Maintenance reviewed       ROS:  Gen: no fatigue, no fever, no chills, no unexplained weight loss or weight gain  Eyes: no excessive tearing, itching, or discharge  Nose: no rhinorrhea, no sinus pain  Mouth: no oral lesions, no sore throat, no difficulty swallowing  Resp: no shortness of breath, no wheezing, no cough  CV: no chest pain, no orthopnea, no paroxysmal nocturnal dyspnea, no lower extremity edema, no palpitations  Abd: no nausea, no heartburn, no diarrhea, no constipation  Neuro: no headaches, no syncope or presyncopal episodes  Endo: no polyuria, no polydipsia.     : no hematuria, no dysuria, no frequency, no incontinence  Heme: no lymphadenopathy, no easy bruising or bleeding, no night sweats  MSK: no joint pain or swelling    PE:  Visit Vitals  /73 (BP 1 Location: Left arm, BP Patient Position: Sitting)   Pulse 74   Temp 98.4 °F (36.9 °C) (Oral)   Resp 16   Ht 5' 5\" (1.651 m)   Wt 227 lb (103 kg)   SpO2 97%   BMI 37.77 kg/m²     Gen: alert, oriented, no acute distress  Head: normocephalic, atraumatic  Eyes: pupils equal round reactive to light, sclera clear, conjunctiva clear  Oral: moist mucus membranes, no oral lesions, no pharyngeal inflammation or exudate  Neck: symmetric normal sized thyroid, no carotid bruits, no jugular vein distention  Resp: no increase work of breathing, lungs clear to ausculation bilaterally, no wheezing, rales or rhonchi  CV: S1, S2 normal.  No murmurs, rubs, or gallops. Abd: soft, mild tender on left side but no rebound pain, not distended. No hepatosplenomegaly. Normal bowel sounds. There are some palpable lumps to the left lateral mid abdomen. Neuro: cranial nerves intact, normal strength and movement in all extremities, and sensation intact and symmetric. Skin: no lesion or rash  Extremities: no cyanosis or edema    Results for orders placed or performed in visit on 03/19/20   AMB POC URINALYSIS DIP STICK AUTO W/O MICRO   Result Value Ref Range    Color (UA POC) Yellow     Clarity (UA POC) Clear     Glucose (UA POC) Negative Negative    Bilirubin (UA POC) Negative Negative    Ketones (UA POC) Negative Negative    Specific gravity (UA POC) 1.020 1.001 - 1.035    Blood (UA POC) Negative Negative    pH (UA POC) 7 4.6 - 8.0    Protein (UA POC) Negative Negative    Urobilinogen (UA POC) 0.2 mg/dL 0.2 - 1    Nitrites (UA POC) Negative Negative    Leukocyte esterase (UA POC) Negative Negative       Assessment/Plan:  Differential diagnosis and treatment options reviewed with patient who is in agreement with treatment plan as outlined below. ICD-10-CM ICD-9-CM    1. Abdominal pain, unspecified abdominal location R10.9 789.00 AMB POC URINALYSIS DIP STICK AUTO W/O MICRO   2.  Lipoma of abdominal wall D17.1 214.1 REFERRAL TO GENERAL SURGERY     She denies any urinary symptoms. She denies any internal GI symptoms. She feels that her symptoms are similar to lipoma symptoms in the past.  Referral to surgery that did her last surgery. She will call after she calls and makes this appointment and let me know when. If they cannot see her fairly quickly then I will order imaging. She cannot tolerate medications such as gabapentin or nortriptyline for nerve irritation secondary to her seizure medications. May need to send neurology message regarding this to see what his thoughts are regarding potential medication interaction. If she develops fever or increased pain or new symptoms then she will need to go to ER for immediate evaluation/imaging. For now, if compressing the area helps ease her discomfort then she will wear supportive abdominal binder that her mother has lended her. Discussed BMI and healthy weight. Encouraged patient to work to implement changes including diet high in raw fruits and vegetables, lean protein and good fats. Limit refined, processed carbohydrates and sugar. Encouraged regular exercise. I have discussed the diagnosis with the patient and the intended plan as seen in the above orders. The patient has received an after-visit summary and questions were answered concerning future plans. I have discussed medication side effects and warnings with the patient as well. The patient verbalizes understanding and agreement with the plan.

## 2020-03-19 NOTE — PROGRESS NOTES
Chief Complaint   Patient presents with    Side Pain    Abdominal Pain     1. Have you been to the ER, urgent care clinic since your last visit? Hospitalized since your last visit? No    2. Have you seen or consulted any other health care providers outside of the [address] [place] [address] since your last visit? Include any pap smears or colon screening. No    Health maintenance reviewed. Pt informed of health maintenance past due and/or upcoming. Pt verbalized understanding.      Health Maintenance Due   Topic Date Due    DTaP/Tdap/Td series (1 - Tdap) 11/20/1990    Lipid Screen  11/20/2009    Medicare Yearly Exam  10/29/2018    PAP AKA CERVICAL CYTOLOGY  05/13/2019    Shingrix Vaccine Age 50> (1 of 2) 11/20/2019    FOBT Q1Y Age 50-75  04/08/2020

## 2020-03-19 NOTE — TELEPHONE ENCOUNTER
Jackie Corona is calling requesting a PA for  topiramate ER (TROKENDI XR) 200 mg capsule [[id number].   Patient is a lot out of the medication

## 2020-03-19 NOTE — PATIENT INSTRUCTIONS
Lipoma: Care Instructions  Your Care Instructions  A lipoma is a growth of fat just below the skin. It may feel soft and rubbery. Lipomas can occur anywhere on the body. But they are most common on the torso, neck, upper thighs, upper arms, and armpits. A lipoma does not turn into cancer. Lipomas usually are not treated, because most of them don't hurt or cause problems. But your doctor may remove a lipoma if it is painful, gets infected, or bothers you. Follow-up care is a key part of your treatment and safety. Be sure to make and go to all appointments, and call your doctor if you are having problems. It's also a good idea to know your test results and keep a list of the medicines you take. How can you care for yourself at home? · A lipoma usually needs no care at home unless your doctor made a cut (incision) to remove it. · If your doctor told you how to care for your incision, follow your doctor's instructions. If you did not get instructions, follow this general advice:  ? Wash around the incision with clean water 2 times a day. Don't use hydrogen peroxide or alcohol. These can slow healing. ? You may cover the incision with a thin layer of petroleum jelly, such as Vaseline, and a nonstick bandage. ? Apply more petroleum jelly and replace the bandage as needed. When should you call for help? Call your doctor now or seek immediate medical care if:    · You have signs of infection, such as:  ? Increased pain, swelling, warmth, or redness. ? Red streaks leading from the lipoma. ? Pus draining from the lipoma. ? A fever.    Watch closely for changes in your health, and be sure to contact your doctor if:    · The lipoma is growing or changing.     · You do not get better as expected. Where can you learn more? Go to http://manju-haritha.info/  Enter J054 in the search box to learn more about \"Lipoma: Care Instructions. \"  Current as of: October 30, 2019Content Version: 12.4  © 6048-1981 Healthwise, Incorporated. Care instructions adapted under license by [employer] (which disclaims liability or warranty for this information). If you have questions about a medical condition or this instruction, always ask your healthcare professional. [place] [address] any warranty or liability for your use of this information.

## 2020-03-20 ENCOUNTER — TELEPHONE (OUTPATIENT)
Dept: FAMILY MEDICINE CLINIC | Age: 50
End: 2020-03-20

## 2020-03-20 DIAGNOSIS — R10.9 LEFT LATERAL ABDOMINAL PAIN: ICD-10-CM

## 2020-03-20 DIAGNOSIS — R93.2 ABNORMAL LIVER CT: Primary | ICD-10-CM

## 2020-03-20 DIAGNOSIS — D17.1 LIPOMA OF ABDOMINAL WALL: Primary | ICD-10-CM

## 2020-03-20 NOTE — TELEPHONE ENCOUNTER
Patient was seen in the office 3/19 for pain in side. Caller spoke with nurse Teto Hills of general surgeon in [facility], and they are asking for patient's provider to put an order in for CT scan or ultrasound.     # [phone]

## 2020-03-23 ENCOUNTER — TELEPHONE (OUTPATIENT)
Dept: FAMILY MEDICINE CLINIC | Age: 50
End: 2020-03-23

## 2020-03-23 DIAGNOSIS — Z13.220 SCREENING, LIPID: ICD-10-CM

## 2020-03-23 DIAGNOSIS — R56.9 SEIZURE (HCC): ICD-10-CM

## 2020-03-23 DIAGNOSIS — R10.9 ABDOMINAL PAIN, UNSPECIFIED ABDOMINAL LOCATION: ICD-10-CM

## 2020-03-23 DIAGNOSIS — R93.2 ABNORMAL LIVER CT: Primary | ICD-10-CM

## 2020-03-23 DIAGNOSIS — Z13.29 SCREENING FOR THYROID DISORDER: ICD-10-CM

## 2020-03-23 NOTE — TELEPHONE ENCOUNTER
----- Message from Karoline Ragland MD sent at 3/23/2020  1:15 PM EDT -----  Regarding: RE: patient question  Phenytoin is liver metabolized and can certainly affect her liver but her LFT's should be abnormal. I would recommend checking her phenytoin (free and total) and topiramate levels. She can be started on Gabapentin to see if it helps with her pain.  ----- Message -----  From: Giovanna Elkins NP  Sent: 3/23/2020  10:28 AM EDT  To: Karoline Ragland MD  Subject: patient question                                 Good morning Dr Rosendo Mckeon,    Ms Aden Bonilla saw me in the office last week for left abdominal/flank pain. Mother notes that she has history of lipoma in that area that had been surgically removed in the past and patient's complaints similar to past lipoma complaints, describing pain as burning and sometimes grabbing such as a nerve irritation. Mother notes that last time she had a lipoma removed the surgeon told her that he did not remove the entire lipoma secondary to \"nerves being intertwined with the lipoma\". I had ordered a US for her to have done at [facility] and referred her back to surgery but over the weekend she had worsening pain and went to ER. The Er did non contrast CT which did not show any acute findings but did potentially note abnormal visualization of the liver. I am going to have the [address] done and look at the liver as well, she has this scheduled for Wednesday morning. My question for you:  mother concerned about trying her on any medication for nerve irritation given her medications for her seizures, I told her that I would discuss with you. Secondly, her liver enzymes have been normal in the past, mother concerned that maybe one of her seizure medicines could be culprit for new found liver abnormality. I was going to have her do some lab work, ER did not order any labs.   Besides looking at her liver functions, are there any labs you would like me to add on from your perspective?        Copy of [place] CT:  CT ABDOMEN AND PELVIS W/O CONTRAST      COMPLETED DATE AND TIME:    3/22/2020 11:00 am        REASON FOR EXAM:    LUQ abdominal wall hernia vs. lipona        COMPARISON:    None        TECHNIQUE:    All CT scans performed at [facility] facilities are performed    using dose optimization techniques as appropriate to exam including the    following: Automated exposure control, adjustment of the mA or kVp    according to patient size, and the use of iterative reconstruction    techniques.        Noncontrast CT abdomen/ pelvis performed.  Coronal and sagittal    reconstructions acquired.        REPORT:    Visualized lung bases are clear.  Nodular hepatic contour suggest    cirrhosis.  Spleen is within normal limits for size.  Pancreas,    gallbladder, and adrenal glands are unremarkable for noncontrast technique.    Kidneys are morphologically within normal limits for noncontrast technique.    No hydronephrosis.  Tiny punctate renal calculus lower pole left kidney    suggested on coronal image 67 series 3.  Abdominal aorta is nonaneurysmal.    No evidence for acute appendicitis.  Bladder is largely decompressed.  No    mechanical bowel obstruction.  No gross ascites or intraperitoneal free air    demonstrated.  Small capsular calcification posterior inferior right    hepatic lobe.  No suspicious osseous lesions demonstrated.  Minimal    somewhat linear fat stranding within the left upper quadrant subcutaneous    soft tissues.  Provided history of prior surgical resection in this region.    No subcutaneous fluid collection.  No abdominal/ventral hernia demonstrated    in this region.  Tiny fat containing paraumbilical hernia incidentally    noted.  Few probable injection granuloma suggested in the gluteal regions.       [facility]      CT Abdomen Pelvis WO Contrast (03/22/2020 11:00 AM EDT)  Procedure Note  Interface, Ris Results In - 03/22/2020 11:24 AM EDT  ++++++++++++++++++++++++++++++++++++++++++++++++++++++++++++++++++  +++++CLASS CODE+++++: P-positive findings +++++ POSITIVE +++++  ++++++++++++++++++++++++++++++++++++++++++++++++++++++++++++++++++  [id number] 3/22/2020 11:00:24 AM EDT      RESULT  EXAM DESCRIPTION:  CT ABDOMEN AND PELVIS W/O CONTRAST  FACILITY/LOCATION:    COMPLETED DATE AND TIME:  3/22/2020 11:00 am    REASON FOR EXAM:  LUQ abdominal wall hernia vs. lipona    COMPARISON:  None    TECHNIQUE:  All CT scans performed at [facility] facilities are performed  using dose optimization techniques as appropriate to exam including the  following: Automated exposure control, adjustment of the mA or kVp  according to patient size, and the use of iterative reconstruction  techniques. Noncontrast CT abdomen/ pelvis performed. Coronal and sagittal  reconstructions acquired. REPORT:  Visualized lung bases are clear. Nodular hepatic contour suggest  cirrhosis. Spleen is within normal limits for size. Pancreas,  gallbladder, and adrenal glands are unremarkable for noncontrast technique. Kidneys are morphologically within normal limits for noncontrast technique. No hydronephrosis. Tiny punctate renal calculus lower pole left kidney  suggested on coronal image 67 series 3. Abdominal aorta is nonaneurysmal.  No evidence for acute appendicitis. Bladder is largely decompressed. No  mechanical bowel obstruction. No gross ascites or intraperitoneal free air  demonstrated. Small capsular calcification posterior inferior right  hepatic lobe. No suspicious osseous lesions demonstrated. Minimal  somewhat linear fat stranding within the left upper quadrant subcutaneous  soft tissues. Provided history of prior surgical resection in this region. No subcutaneous fluid collection. No abdominal/ventral hernia demonstrated  in this region. Tiny fat containing paraumbilical hernia incidentally  noted.  Few probable injection granuloma suggested in the gluteal regions. IMPRESSION:  1. Subtle subcutaneous fat stranding in the left upper quadrant and  reported region of prior surgical intervention. No subcutaneous soft  tissue fluid collection in this region or ventral abdominal hernia. 2. Nodular hepatic contour suggests cirrhosis. Please correlate  clinically. 3. Possible punctate nonobstructive left renal calculus. Thanks so much for your help.   [facility] NP

## 2020-03-23 NOTE — TELEPHONE ENCOUNTER
This was ordered but looks like she may have already went to ER over the weekend so she may not need this done any longer. Can we call and confirm this with patient mother. Looks like they went to [facility] so I am not seeing the entire note because it is not our system.

## 2020-03-23 NOTE — TELEPHONE ENCOUNTER
Mother states she did go to [facility] yesterday she wants to know would like the records or can see everything. She also states  they set pt up to have abdomen US done on Wednesday.

## 2020-03-23 NOTE — TELEPHONE ENCOUNTER
I called and spoke with mother. Incidental finding on CT done in ER shows abnormal liver. I ordered US of liver to be done with her US she is having Wednesday. Mother will call surgeon and see when her appointment with them is scheduled. I sent a message to her neurologist as well. Will need some labs done since ER did not do any labs. Previous liver functions have been normal.  Will call her back after I hear from neurology to make sure he does not have any further labs he would like added on.

## 2020-03-23 NOTE — TELEPHONE ENCOUNTER
Called pt's mother (checked disclosure) and verified name and . Voiced to Mother \"Please let mother of patient know that I heard back from Dr Radhames Negro (neurology) via email and he suggested that I check her topamax and phenytoin levels when we do her labs.  I have put labs in for her to have done in future, she should be fasting for 8 hours but can drink plenty of water.  Would she like to do these labs here or at [address] will check her hepatic function as well with these labs.     Dr Radhames Negro also says that if her pain is bad then he is ok with her trying gabapentin for pain control. Let me know her thoughts. Mother is going to get labs done at [address] on Wednesday. Also, added on diagnostic code to check liver with US with [facility].

## 2020-03-29 LAB
ALBUMIN SERPL-MCNC: 4.2 G/DL (ref 3.8–4.8)
ALP SERPL-CCNC: 85 IU/L (ref 39–117)
ALT SERPL-CCNC: 32 IU/L (ref 0–32)
AST SERPL-CCNC: 26 IU/L (ref 0–40)
BASOPHILS # BLD AUTO: 0 X10E3/UL (ref 0–0.2)
BASOPHILS NFR BLD AUTO: 1 %
BILIRUB DIRECT SERPL-MCNC: 0.08 MG/DL (ref 0–0.4)
BILIRUB SERPL-MCNC: 0.3 MG/DL (ref 0–1.2)
BUN SERPL-MCNC: 13 MG/DL (ref 6–24)
BUN/CREAT SERPL: 20 (ref 9–23)
CALCIUM SERPL-MCNC: 8.5 MG/DL (ref 8.7–10.2)
CHLORIDE SERPL-SCNC: 107 MMOL/L (ref 96–106)
CHOLEST SERPL-MCNC: 198 MG/DL (ref 100–199)
CO2 SERPL-SCNC: 20 MMOL/L (ref 20–29)
CREAT SERPL-MCNC: 0.66 MG/DL (ref 0.57–1)
EOSINOPHIL # BLD AUTO: 0.2 X10E3/UL (ref 0–0.4)
EOSINOPHIL NFR BLD AUTO: 3 %
ERYTHROCYTE [DISTWIDTH] IN BLOOD BY AUTOMATED COUNT: 12.7 % (ref 11.7–15.4)
GLUCOSE SERPL-MCNC: 96 MG/DL (ref 65–99)
HAV IGM SERPL QL IA: NEGATIVE
HBV CORE IGM SERPL QL IA: NEGATIVE
HBV SURFACE AG SERPL QL IA: NEGATIVE
HCT VFR BLD AUTO: 44.8 % (ref 34–46.6)
HCV AB S/CO SERPL IA: <0.1 S/CO RATIO (ref 0–0.9)
HDLC SERPL-MCNC: 46 MG/DL
HGB BLD-MCNC: 15.5 G/DL (ref 11.1–15.9)
IMM GRANULOCYTES # BLD AUTO: 0 X10E3/UL (ref 0–0.1)
IMM GRANULOCYTES NFR BLD AUTO: 0 %
INTERPRETATION, 910389: NORMAL
LDLC SERPL CALC-MCNC: 132 MG/DL (ref 0–99)
LYMPHOCYTES # BLD AUTO: 1.7 X10E3/UL (ref 0.7–3.1)
LYMPHOCYTES NFR BLD AUTO: 28 %
MCH RBC QN AUTO: 34.4 PG (ref 26.6–33)
MCHC RBC AUTO-ENTMCNC: 34.6 G/DL (ref 31.5–35.7)
MCV RBC AUTO: 99 FL (ref 79–97)
MONOCYTES # BLD AUTO: 0.6 X10E3/UL (ref 0.1–0.9)
MONOCYTES NFR BLD AUTO: 9 %
NEUTROPHILS # BLD AUTO: 3.6 X10E3/UL (ref 1.4–7)
NEUTROPHILS NFR BLD AUTO: 59 %
PHENYTOIN FREE SERPL-MCNC: ABNORMAL UG/ML
PHENYTOIN SERPL-MCNC: 32.1 UG/ML (ref 10–20)
PLATELET # BLD AUTO: 208 X10E3/UL (ref 150–450)
POTASSIUM SERPL-SCNC: 3.8 MMOL/L (ref 3.5–5.2)
PROT SERPL-MCNC: 6.5 G/DL (ref 6–8.5)
RBC # BLD AUTO: 4.51 X10E6/UL (ref 3.77–5.28)
SODIUM SERPL-SCNC: 144 MMOL/L (ref 134–144)
TOPIRAMATE SERPL-MCNC: 4.5 UG/ML (ref 2–25)
TRIGL SERPL-MCNC: 100 MG/DL (ref 0–149)
TSH SERPL DL<=0.005 MIU/L-ACNC: 1.34 UIU/ML (ref 0.45–4.5)
VLDLC SERPL CALC-MCNC: 20 MG/DL (ref 5–40)
WBC # BLD AUTO: 6 X10E3/UL (ref 3.4–10.8)

## 2020-03-30 ENCOUNTER — TELEPHONE (OUTPATIENT)
Dept: NEUROLOGY | Age: 50
End: 2020-03-30

## 2020-03-30 DIAGNOSIS — K74.60 CIRRHOSIS OF LIVER WITHOUT ASCITES, UNSPECIFIED HEPATIC CIRRHOSIS TYPE (HCC): Primary | ICD-10-CM

## 2020-03-30 NOTE — PROGRESS NOTES
Spoke to ARRON JIMÉNEZ with hepatology via staff message  who agrees that patient appears stable, given Covid-19 pandemic they are only seeing acutely ill patients at this time but says that patient should have follow up/further testing done in about 4-6 months, recommend that she have referral to them for fibroscan. I called mother and let her know this information, referral entered. Mother will call and schedule appointment for August/September.

## 2020-03-30 NOTE — PROGRESS NOTES
Per Dr Spann Mage: \"Have her lower her Dilantin to 2 BID instead of 2 in am and 3 at bedtime. I will then check her levels when she sees me this month. \"  I spoke to mother says that she has spoke to neurology about this in the past and says that every time the dose is lowered she starts have seizures. She says that she will call Dr Anant Fuller and discuss this more prior to adjusting dose. All liver functions are normal.    Will reach out to hepatology to see if they have any concerns regarding incidental findings on recent imaging showing cirrhosis. Mother is still trying to get in touch with surgery at [facility] regarding lipomas to abdomen.

## 2020-04-01 ENCOUNTER — TELEPHONE (OUTPATIENT)
Dept: NEUROLOGY | Age: 50
End: 2020-04-01

## 2020-04-01 NOTE — TELEPHONE ENCOUNTER
Patient is rescheduled the April appt. To June. Patient's  Mother Jose bhakta stated that the patient doesn't want to lower the dilantin medication, that when they tried to lower it before the patient had seizures from it.

## 2020-04-02 ENCOUNTER — VIRTUAL VISIT (OUTPATIENT)
Dept: NEUROLOGY | Age: 50
End: 2020-04-02

## 2020-04-02 DIAGNOSIS — G40.209 PARTIAL SYMPTOMATIC EPILEPSY WITH COMPLEX PARTIAL SEIZURES, NOT INTRACTABLE, WITHOUT STATUS EPILEPTICUS (HCC): Primary | ICD-10-CM

## 2020-04-02 DIAGNOSIS — F41.9 ANXIETY: ICD-10-CM

## 2020-04-02 DIAGNOSIS — Z51.81 THERAPEUTIC DRUG MONITORING: ICD-10-CM

## 2020-04-02 RX ORDER — DIAZEPAM 10 MG/2ML
10 GEL RECTAL
Qty: 2 KIT | Refills: 3 | Status: SHIPPED | OUTPATIENT
Start: 2020-04-02 | End: 2021-12-09 | Stop reason: ALTCHOICE

## 2020-04-02 NOTE — PROGRESS NOTES
Beto Bhagat is a 47 y.o. female evaluated via telephone on 4/2/2020. Consent:  She and/or health care decision maker is aware that she may receive a bill for this telephone service, depending on her insurance coverage, and has provided verbal consent to proceed: Yes      Documentation:  I communicated with the patient and/or health care decision maker about issue with elevated phenytoin level. Details of this discussion including any medical advice provided:     Patient is a 60-year-old white female with history of epilepsy, migraine headaches, obesity, arthritis and mental developmental delay. Patient is maintained for her seizures on Trokendi  mg, 2 daily and branded Dilantin 200 mg in the morning and 330 mg at bedtime. Denies any issues with vision changes, seeing double, speech changes, numbness and tingling or wobbliness. Laboratory work-up done 10/15/2019 revealed unremarkable CBC, CMP, topiramate level of 3.7 which is at the low therapeutic level, elevated phenytoin level at 23.4 but with therapeutic free phenytoin at 1.5. No changes made to her regimen. Patient was seen by her PCP 3/19/2020 for complaints of abdominal pain. Laboratory work-up done 3/25/2020 revealed unremarkable CBC, CMP, topiramate level of 4.5 which is low therapeutic level and highly elevated phenytoin at 32.1. Received a correspondence from her PCP in relation to potential adjustment of her Dilantin given the highly elevated levels. Per patient and her mother, every time her Dilantin is reduced even by 30 mg patient would have breakthrough seizures. They are concerned that if we will lower her Dilantin that this will cause breakthrough seizures. Last mini seizure was January 2020 that only lasted for 30 seconds to 1 minute. Explained to both patient and mother that elevated levels of phenytoin can cause toxicity (cardiac arrhythmia, hepatotoxicity, bone marrow suppression and etc.).   Patient is currently not having any clinical signs of toxicity. Previous laboratory work-up revealed elevated total phenytoin but the actual free phenytoin level was therapeutic. Plan is to obtain free phenytoin level and see if this coincides with the elevated total phenytoin level. If it is then plan would be to reduce Dilantin by 30 mg and increase her Trokendi by 50 to 100 mg. Obtain topiramate level. Obtain EKG to assess for any signs of cardiac adverse effect from the elevated phenytoin level. I message patient's PCP as the day live 6 miles from the clinic to have this obtained there. Diastat 10 mg was also prescribed as needed for breakthrough seizures. Further intervention be done pending results of the testing. They understood. Advised that if any signs of toxicity occurs to go to the nearest ER. Phone call lasted 17 minutes. I affirm this is a Patient Initiated Episode with an Established Patient who has not had a related appointment within my department in the past 7 days or scheduled within the next 24 hours.     Total Time: minutes: 11-20 minutes    Note: not billable if this call serves to triage the patient into an appointment for the relevant concern    Lillie Reilly MD

## 2020-04-02 NOTE — PATIENT INSTRUCTIONS
Epilepsy: Care Instructions Your Care Instructions Epilepsy is a common condition that causes repeated seizures. The seizures are caused by bursts of electrical activity in the brain that aren't normal. Seizures may cause problems with muscle control, movement, speech, vision, or awareness. They can be scary. Epilepsy affects each person differently. Some people have only a few seizures. Others get them more often. If you know what triggers a seizure, you may be able to avoid having one. You can take medicines to control and reduce seizures. You and your doctor will need to find the right combination, schedule, and dose of medicine. This may take time and careful changes. Seizures may get worse and happen more often over time. Follow-up care is a key part of your treatment and safety. Be sure to make and go to all appointments, and call your doctor if you are having problems. It's also a good idea to know your test results and keep a list of the medicines you take. How can you care for yourself at home? · Be safe with medicines. Take your medicines exactly as prescribed. Call your doctor if you think you are having a problem with your medicine. · Make a treatment plan with your doctor. Be sure to follow your plan. · Try to identify and avoid things that may make you more likely to have a seizure. These may include: ? Not getting enough sleep. ? Using drugs or alcohol. ? Being emotionally stressed. ? Skipping meals. · Keep a record of any seizures you have. Note the date, time of day, and any details about the seizure that you can remember. Your doctor can use this information to plan or adjust your medicine or other treatment. · Be sure that any doctor treating you for another condition knows that you have epilepsy. Each doctor should know what medicines you are taking, if any. · Wear a medical ID bracelet. You can buy this at most [employer].  If you have a seizure that leaves you unconscious or unable to speak for yourself, this bracelet will let those who are treating you know that you have epilepsy. · Talk to your doctor about whether it is safe for you to do certain activities, such as drive or swim. When should you call for help? Call 911 anytime you think you may need emergency care. For example, call if: 
  · A seizure does not stop as it normally does.  
  · You have new symptoms such as: 
? Numbness, tingling, or weakness on one side of your body or face. ? Vision changes. ? Trouble speaking or thinking clearly.  
 Call your doctor now or seek immediate medical care if: 
  · You have a fever.  
  · You have a severe headache.  
 Watch closely for changes in your health, and be sure to contact your doctor if: 
  · The normal pattern or features of your seizures change. Where can you learn more? Go to http://manju-haritha.info/ Katie Hassan in the search box to learn more about \"Epilepsy: Care Instructions. \" Current as of: November 19, 2019Content Version: 12.4 © 0777-2195 Healthwise, Incorporated. Care instructions adapted under license by [employer] (which disclaims liability or warranty for this information). If you have questions about a medical condition or this instruction, always ask your healthcare professional. [address] any warranty or liability for your use of this information.

## 2020-04-03 ENCOUNTER — HOSPITAL ENCOUNTER (OUTPATIENT)
Dept: LAB | Age: 50
Discharge: HOME | End: 2020-04-03
Payer: MEDICARE

## 2020-04-03 ENCOUNTER — CLINICAL SUPPORT (OUTPATIENT)
Dept: FAMILY MEDICINE CLINIC | Age: 50
End: 2020-04-03

## 2020-04-03 DIAGNOSIS — R78.89 ELEVATED PHENYTOIN LEVEL: ICD-10-CM

## 2020-04-03 DIAGNOSIS — R56.9 SEIZURE (HCC): Primary | ICD-10-CM

## 2020-04-03 DIAGNOSIS — Z51.81 THERAPEUTIC DRUG MONITORING: ICD-10-CM

## 2020-04-03 PROCEDURE — 80186 ASSAY OF PHENYTOIN FREE: CPT

## 2020-04-03 PROCEDURE — 80201 ASSAY OF TOPIRAMATE: CPT

## 2020-04-03 NOTE — PROGRESS NOTES
Chief Complaint   Patient presents with    Other     EKG needed   [address] drawn by [facility] tech as ordered. EKG done per Gypsy Claude, NP.     Pt declined AVS.

## 2020-04-06 DIAGNOSIS — G40.209 PARTIAL SYMPTOMATIC EPILEPSY WITH COMPLEX PARTIAL SEIZURES, NOT INTRACTABLE, WITHOUT STATUS EPILEPTICUS (HCC): ICD-10-CM

## 2020-04-06 LAB
PHENYTOIN FREE SERPL-MCNC: 2.1 UG/ML (ref 1–2)
TOPIRAMATE SERPL-MCNC: 2.7 UG/ML (ref 2–25)

## 2020-04-06 NOTE — PROGRESS NOTES
I had to order these labs under me because they were drawn at our office.   Here are the levels that you requested for Ms Seal

## 2020-04-07 NOTE — TELEPHONE ENCOUNTER
Prior Authorization APPROVED for Trokendi 200mg XR by [employer]. Effective dates 01/07/20 - 04/06/21, Case #(see previous note), Approval will be scanned into media.  Pharmacy has been notified of approval.

## 2020-04-09 DIAGNOSIS — G40.209 PARTIAL SYMPTOMATIC EPILEPSY WITH COMPLEX PARTIAL SEIZURES, NOT INTRACTABLE, WITHOUT STATUS EPILEPTICUS (HCC): ICD-10-CM

## 2020-04-09 DIAGNOSIS — Z51.81 THERAPEUTIC DRUG MONITORING: ICD-10-CM

## 2020-04-15 NOTE — PROGRESS NOTES
Called patient's mother and informed her that the free phenytoin level was okay at 2.1. Low topiramate level. EKG was unremarkable. Continue current dose of Dilantin and Topiramate. Recheck total/free phenytoin, CBC and CMP in 2 months. She understood.

## 2020-05-01 DIAGNOSIS — R10.9 LEFT LATERAL ABDOMINAL PAIN: ICD-10-CM

## 2020-05-01 DIAGNOSIS — D17.1 LIPOMA OF ABDOMINAL WALL: ICD-10-CM

## 2020-05-26 PROBLEM — E78.00 ELEVATED LDL CHOLESTEROL LEVEL: Status: ACTIVE | Noted: 2020-05-26

## 2020-05-26 RX ORDER — FLUOXETINE HYDROCHLORIDE 20 MG/1
20 CAPSULE ORAL
Qty: 90 CAP | Refills: 0 | Status: SHIPPED | OUTPATIENT
Start: 2020-05-26 | End: 2020-07-07 | Stop reason: SDUPTHER

## 2020-07-07 ENCOUNTER — TELEPHONE (OUTPATIENT)
Dept: NEUROLOGY | Age: 50
End: 2020-07-07

## 2020-07-07 DIAGNOSIS — G40.209 PARTIAL SYMPTOMATIC EPILEPSY WITH COMPLEX PARTIAL SEIZURES, NOT INTRACTABLE, WITHOUT STATUS EPILEPTICUS (HCC): ICD-10-CM

## 2020-07-07 RX ORDER — EXTENDED PHENYTOIN SODIUM 30 MG/1
30 CAPSULE ORAL
Qty: 90 CAP | Refills: 1 | Status: SHIPPED | OUTPATIENT
Start: 2020-07-07 | End: 2020-12-23

## 2020-07-07 RX ORDER — PHENYTOIN SODIUM 100 MG/1
CAPSULE, EXTENDED RELEASE ORAL
Qty: 450 CAP | Refills: 1 | Status: SHIPPED | OUTPATIENT
Start: 2020-07-07 | End: 2020-12-23

## 2020-07-07 RX ORDER — FLUOXETINE HYDROCHLORIDE 20 MG/1
20 CAPSULE ORAL
Qty: 90 CAP | Refills: 1 | Status: SHIPPED | OUTPATIENT
Start: 2020-07-07 | End: 2021-02-15

## 2020-07-07 NOTE — TELEPHONE ENCOUNTER
Need refill  Floticasone,20mg  Dilantin 100mg , [id number] mg   trokendex 200 mg     [phone]  Please send to mail order

## 2020-08-28 ENCOUNTER — VIRTUAL VISIT (OUTPATIENT)
Dept: FAMILY MEDICINE CLINIC | Age: 50
End: 2020-08-28
Payer: MEDICARE

## 2020-08-28 DIAGNOSIS — S69.91XA HAND INJURY, RIGHT, INITIAL ENCOUNTER: Primary | ICD-10-CM

## 2020-08-28 PROCEDURE — 99442 PR PHYS/QHP TELEPHONE EVALUATION 11-20 MIN: CPT | Performed by: NURSE PRACTITIONER

## 2020-08-28 NOTE — PROGRESS NOTES
Tamika Desir is a 47 y.o. female, evaluated via audio-only technology on 8/28/2020 for Finger Pain (right hand, pinky finger) and Hand Pain (right pain)  12  Subjective: Mother says that patient was sitting in a plastic lawnchair on the deck and the plastic chair broke and she fell out the chair and caught herself with her right hand. This happened on 8/23/20. Mom has been taking advil and applying ice and a glove that would help stabilize during the night. Has been hurting, mom thought that she just bruised it or something because she could move it but now there is a \"knot\" there where it was hurting (on the top of her hand below her pinky per mom) . She says that the bruising is better and minimal swelling. Mom says she can move her fingers fine but if anything touches her hand or if she  her hand it hurts      Prior to Admission medications    Medication Sig Start Date End Date Taking? Authorizing Provider   FLUoxetine (PROzac) 20 mg capsule Take 1 Cap by mouth nightly. 7/7/20  Yes Ayana Fong MD   topiramate ER (Trokendi XR) 200 mg capsule Take 2 Caps by mouth nightly. 7/7/20  Yes Ayana Fong MD   Dilantin Extended 100 mg ER capsule Take 2 caps in am and 3 caps at bedtime. Dilantin brand medically necessary. 7/7/20  Yes Ayana Fong MD   Dilantin 30 mg ER capsule Take 1 Cap by mouth nightly. Added to 300 mg at bedtime. Dilantin brand medically necessary. 7/7/20  Yes Ayana Fong MD   diazePAM (Diastat AcuDiaL) 5-7.5-10 mg kit Insert 10 mg into rectum daily as needed (seizures).  4/2/20  Yes Ayana Fong MD   fluticasone propionate (FLONASE) 50 mcg/actuation nasal spray USE 2 SPRAYS IN EACH NOSTRIL QD FOR ALLERGIES 5/9/19  Yes Provider, Historical   medroxyPROGESTERone (DEPO-PROVERA) 150 mg/mL injection INJECT 150MG INTO THE MUSCLE Q 12 WEEKS 4/22/19  Yes Provider, Historical   methenamine hippurate (HIPREX) 1 gram tablet Take 1 g by mouth two (2) times daily (with meals). Yes Provider, Historical   cranberry fruit extract (CRANBERRY PO) Take 1 Tab by mouth daily. Yes Provider, Historical   butalbital-acetaminophen-caffeine (FIORICET, ESGIC) -40 mg per tablet Take 1 Tab by mouth. Yes Provider, Historical   L. acidoph & paracasei- S therm- Bifido (JONNY-Q/RISAQUAD) 8 billion cell cap cap Take 1 Cap by mouth daily. 4/15/19  Yes Darby Llanes MD   mirabegron ER [facility]) 25 mg ER tablet Take 25 mg by mouth every Monday, Wednesday, Friday.    Yes Provider, Historical     Patient Active Problem List    Diagnosis Date Noted    Elevated LDL cholesterol level 05/26/2020    Diarrhea 04/08/2019    Colitis 04/08/2019    NSAID long-term use 04/08/2019    Blood in stool 04/08/2019    Sepsis ([facility] [address] 04/07/2019    Severe obesity (BMI 35.0-39.9) 07/17/2018    Chronic migraine 12/19/2014    Mental developmental delay 12/19/2014    Seizure ([facility] [address] 12/03/2013     Past Medical History:   Diagnosis Date    Arthritis     Bladder incontinence     Chronic ear infection     Ill-defined condition     chronic hydrocephalus; no shunt, well managed    Seizure disorder [facility])      Past Surgical History:   Procedure Laterality Date    COLONOSCOPY N/A 4/10/2019    COLONOSCOPY performed by Kelton Mistry MD at [facility] ENDOSCOPY    COLONOSCOPY N/A 6/14/2019    COLONOSCOPY performed by Alva Yao MD at [facility] ENDOSCOPY    HX APPENDECTOMY      HX OTHER SURGICAL  2002    remove a lypoma       ROS  Gen: no fatigue, fever, chills  Eyes: no excessive tearing, itching, or discharge  Nose: no rhinorrhea, no sinus pain  Mouth: no oral lesions, no sore throat  Resp: no shortness of breath, no wheezing, no cough  CV: no chest pain, no paroxysmal nocturnal dyspnea  Abd: no nausea, no heartburn, no diarrhea, no constipation, no abdominal pain  Neuro: no headaches, no syncope or presyncopal episodes  Endo: no polyuria, no polydipsia  Heme: no lymphadenopathy, no easy bruising or bleeding    No flowsheet data found. Assessment & Plan:   Diagnoses and all orders for this visit:    1. Hand injury, right, initial encounter  Seems that they have been doing appropriate home treatment, still having pain one week later. Concern that there may be a fracture, unable to visualize during phone call. Discussed with mom that she will need xray and potentially splint. Could order xray but if broken mom will have to take her back out to get hand splint/evaluation done anyway. Mom says that she will try either ortho after hours or [facility]. Will call me back if she is unable to go there and I will order xray      Liliana Michele, who was evaluated through a patient-initiated, synchronous (real-time) audio only encounter, and/or her healthcare decision maker, is aware that it is a billable service, with coverage as determined by her insurance carrier. She provided verbal consent to proceed: Yes. She has not had a related appointment within my department in the past 7 days or scheduled within the next 24 hours.       Total Time: minutes: 11-20 minutes    Yeni Anaya NP

## 2020-08-28 NOTE — PROGRESS NOTES
Chief Complaint   Patient presents with    Finger Pain     right hand, pinky finger    Hand Pain     right pain     1. Have you been to the ER, urgent care clinic since your last visit? Hospitalized since your last visit? No    2. Have you seen or consulted any other health care providers outside of the [address] [place] [address] since your last visit? Include any pap smears or colon screening. No    Health maintenance reviewed. Pt informed of health maintenance past due and/or upcoming. Pt verbalized understanding.      Health Maintenance Due   Topic Date Due    Pneumococcal 0-64 years (1 of 1 - PPSV23) 11/20/1975    DTaP/Tdap/Td series (1 - Tdap) 11/20/1990    Medicare Yearly Exam  10/29/2018    PAP AKA CERVICAL CYTOLOGY  05/13/2019    Shingrix Vaccine Age 50> (1 of 2) 11/20/2019    FOBT Q1Y Age 50-75  04/08/2020

## 2020-09-16 ENCOUNTER — OFFICE VISIT (OUTPATIENT)
Dept: NEUROLOGY | Age: 50
End: 2020-09-16
Payer: MEDICARE

## 2020-09-16 VITALS
RESPIRATION RATE: 20 BRPM | TEMPERATURE: 97.9 F | SYSTOLIC BLOOD PRESSURE: 130 MMHG | DIASTOLIC BLOOD PRESSURE: 82 MMHG | HEART RATE: 75 BPM | OXYGEN SATURATION: 99 %

## 2020-09-16 DIAGNOSIS — G40.209 PARTIAL SYMPTOMATIC EPILEPSY WITH COMPLEX PARTIAL SEIZURES, NOT INTRACTABLE, WITHOUT STATUS EPILEPTICUS (HCC): Primary | ICD-10-CM

## 2020-09-16 DIAGNOSIS — Z51.81 THERAPEUTIC DRUG MONITORING: ICD-10-CM

## 2020-09-16 DIAGNOSIS — R25.1 TREMORS OF NERVOUS SYSTEM: ICD-10-CM

## 2020-09-16 DIAGNOSIS — R29.898 RIGHT LEG WEAKNESS: ICD-10-CM

## 2020-09-16 DIAGNOSIS — F41.9 ANXIETY: ICD-10-CM

## 2020-09-16 DIAGNOSIS — G40.209 PARTIAL SYMPTOMATIC EPILEPSY WITH COMPLEX PARTIAL SEIZURES, NOT INTRACTABLE, WITHOUT STATUS EPILEPTICUS (HCC): ICD-10-CM

## 2020-09-16 PROCEDURE — G8432 DEP SCR NOT DOC, RNG: HCPCS | Performed by: PSYCHIATRY & NEUROLOGY

## 2020-09-16 PROCEDURE — G8427 DOCREV CUR MEDS BY ELIG CLIN: HCPCS | Performed by: PSYCHIATRY & NEUROLOGY

## 2020-09-16 PROCEDURE — G8417 CALC BMI ABV UP PARAM F/U: HCPCS | Performed by: PSYCHIATRY & NEUROLOGY

## 2020-09-16 PROCEDURE — 99215 OFFICE O/P EST HI 40 MIN: CPT | Performed by: PSYCHIATRY & NEUROLOGY

## 2020-09-16 PROCEDURE — 3017F COLORECTAL CA SCREEN DOC REV: CPT | Performed by: PSYCHIATRY & NEUROLOGY

## 2020-09-16 NOTE — PATIENT INSTRUCTIONS
Epilepsy: Care Instructions  Your Care Instructions     Epilepsy is a common condition that causes repeated seizures. The seizures are caused by bursts of electrical activity in the brain that aren't normal. Seizures may cause problems with muscle control, movement, speech, vision, or awareness. They can be scary. Epilepsy affects each person differently. Some people have only a few seizures. Others get them more often. If you know what triggers a seizure, you may be able to avoid having one. You can take medicines to control and reduce seizures. You and your doctor will need to find the right combination, schedule, and dose of medicine. This may take time and careful changes. Seizures may get worse and happen more often over time. Follow-up care is a key part of your treatment and safety. Be sure to make and go to all appointments, and call your doctor if you are having problems. It's also a good idea to know your test results and keep a list of the medicines you take. How can you care for yourself at home? · Be safe with medicines. Take your medicines exactly as prescribed. Call your doctor if you think you are having a problem with your medicine. · Make a treatment plan with your doctor. Be sure to follow your plan. · Try to identify and avoid things that may make you more likely to have a seizure. These may include:  ? Not getting enough sleep. ? Using drugs or alcohol. ? Being emotionally stressed. ? Skipping meals. · Keep a record of any seizures you have. Note the date, time of day, and any details about the seizure that you can remember. Your doctor can use this information to plan or adjust your medicine or other treatment. · Be sure that any doctor treating you for another condition knows that you have epilepsy. Each doctor should know what medicines you are taking, if any. · Wear a medical ID bracelet. You can buy this at most [employer].  If you have a seizure that leaves you unconscious or unable to speak for yourself, this bracelet will let those who are treating you know that you have epilepsy. · Talk to your doctor about whether it is safe for you to do certain activities, such as drive or swim. When should you call for help? Call 911 anytime you think you may need emergency care. For example, call if:    · A seizure does not stop as it normally does.     · You have new symptoms such as:  ? Numbness, tingling, or weakness on one side of your body or face. ? Vision changes. ? Trouble speaking or thinking clearly. Call your doctor now or seek immediate medical care if:    · You have a fever.     · You have a severe headache. Watch closely for changes in your health, and be sure to contact your doctor if:    · The normal pattern or features of your seizures change. Where can you learn more? Go to http://manju-haritha.info/  Enter X141 in the search box to learn more about \"Epilepsy: Care Instructions. \"  Current as of: November 20, 2019               Content Version: 12.6  © 9015-3665 [employer]. Care instructions adapted under license by [employer] (which disclaims liability or warranty for this information). If you have questions about a medical condition or this instruction, always ask your healthcare professional. [address] any warranty or liability for your use of this information.

## 2020-09-16 NOTE — PROGRESS NOTES
No seizures since her last visit   Tremors are gradually getting worse the patient sometimes mentions that she feels like she is shaking even on the inside

## 2020-09-16 NOTE — Clinical Note
9/17/20 Patient: Mercedes Stallworth  
YOB: 1970 Date of Visit: 9/16/2020 Mustapha Rudolph NP 
[address] [facility] [address] VIA In Basket Mustapha Rudolph, Medical Student [id number] [address] [address] VIA Dear DANYELL Domingo, Medical Student, Thank you for referring Ms. Arnaldo Pozo to [facility] for evaluation. My notes for this consultation are attached. If you have questions, please do not hesitate to call me. I look forward to following your patient along with you. Sincerely, Aleyda Gross MD

## 2020-09-16 NOTE — PROGRESS NOTES
NEUROLOGY CLINIC NOTE    Patient ID:  Dedra Mahoney  [id number]  89 y.o.  1970    Date of Visit:  September 16, 2020    Reason for Visit:  Seizures, anxiety, gait issues    Chief Complaint   Patient presents with    Follow-up     tremors,epilepsy        History of Present Illness:     Patient Active Problem List    Diagnosis Date Noted    Elevated LDL cholesterol level 05/26/2020    Diarrhea 04/08/2019    Colitis 04/08/2019    NSAID long-term use 04/08/2019    Blood in stool 04/08/2019    Sepsis ([facility] [address] 04/07/2019    Severe obesity (BMI 35.0-39.9) 07/17/2018    Chronic migraine 12/19/2014    Mental developmental delay 12/19/2014    Seizure ([facility] [address] 12/03/2013     Past Medical History:   Diagnosis Date    Arthritis     Bladder incontinence     Chronic ear infection     Ill-defined condition     chronic hydrocephalus; no shunt, well managed    Seizure disorder [facility])       Past Surgical History:   Procedure Laterality Date    COLONOSCOPY N/A 4/10/2019    COL[facility] Munir Young [facility] ENDOSCOPY    COLONOSCOPY N/A 6/14/2019    COLONOSCOPY performed by Dara Leal MD at [facility] ENDOSCOPY    HX APPENDECTOMY      HX OTHER SURGICAL  2002    remove a lypoma      Prior to Admission medications    Medication Sig Start Date End Date Taking? Authorizing Provider   FLUoxetine (PROzac) 20 mg capsule Take 1 Cap by mouth nightly. 7/7/20  Yes Lisandro Tejada MD   topiramate ER (Trokendi XR) 200 mg capsule Take 2 Caps by mouth nightly. 7/7/20  Yes Lisandro Tejada MD   Dilantin Extended 100 mg ER capsule Take 2 caps in am and 3 caps at bedtime. Dilantin brand medically necessary. 7/7/20  Yes Lisandro Tejada MD   Dilantin 30 mg ER capsule Take 1 Cap by mouth nightly. Added to 300 mg at bedtime. Dilantin brand medically necessary. 7/7/20  Yes Lisandro Tejada MD   diazePAM (Diastat AcuDiaL) 5-7.5-10 mg kit Insert 10 mg into rectum daily as needed (seizures). 4/2/20  Yes Louann Mixon MD   fluticasone propionate (FLONASE) 50 mcg/actuation nasal spray USE 2 SPRAYS IN EACH NOSTRIL QD FOR ALLERGIES 5/9/19  Yes Provider, Historical   medroxyPROGESTERone (DEPO-PROVERA) 150 mg/mL injection INJECT 150MG INTO THE MUSCLE Q 12 WEEKS 4/22/19  Yes Provider, Historical   methenamine hippurate (HIPREX) 1 gram tablet Take 1 g by mouth two (2) times daily (with meals). Yes Provider, Historical   cranberry fruit extract (CRANBERRY PO) Take 1 Tab by mouth daily. Yes Provider, Historical   butalbital-acetaminophen-caffeine (FIORICET, ESGIC) -40 mg per tablet Take 1 Tab by mouth. Yes Provider, Historical   L. acidoph & paracasei- S therm- Bifido (JONNY-Q/RISAQUAD) 8 billion cell cap cap Take 1 Cap by mouth daily. 4/15/19  Yes Ulysses Post, MD   mirabegron ER [facility]) 25 mg ER tablet Take 25 mg by mouth every Monday, Wednesday, Friday. Yes Provider, Historical     Allergies   Allergen Reactions    Adhesive Tape-Silicones Other (comments)    Medrol [Methylprednisolone] Anxiety and Other (comments)      Social History     Tobacco Use    Smoking status: Never Smoker    Smokeless tobacco: Never Used   Substance Use Topics    Alcohol use: No      Family History   Problem Relation Age of Onset    Heart Disease Father         Subjective:      Riky Morocho is a 47 y.o. WF with history of epilepsy, migraine headaches, obesity, arthritis and mental developmental delay who is here for follow-up for seizures and is here for follow up. Patient was previously being seen by Dr. Jimbo Ken (neurology). She was last seen 1/24/2019. Note mentions patient having a history of congenital static encephalopathy. Patient was taking Dilantin 200 mg in the morning and 330 mg at bedtime. Patient is also on Trokendi 400 mg daily. Plan then was to continue her medications. Phenytoin level was 27. Per mom and patient, she has been having increased seizures since April 20, 2019. She has had 10 seizures. Last seizure was 1 1/2 week PTC. Right arm and leg jerking with inability to respond. Lasting 3 1/2 minutes. Usual seizures are right side shakes but she is able to respond lasting 1 minute. Labs CBC and CMP done 5/30/2019 were unremarkable except for slight decrease in calcium at 8. 6.and slight increase in ALT at 33. Phenytoin level was 23.7. Review of records reveal patient was admitted at [address] [facility] last 4/7/2019 to 4/15/2019 for sepsis in the setting of acute colitis and UTI. Neurology was consulted then for elevated phenytoin levels. No changes in regimen was made. 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 No acute intracranial abnormality. Since the last visit on 4/03/2020, labs revealed free phenytoin of 2.1 (slight elevated) and topiramate 2.7 which is low therapeutic. No changes made to her regimen. Patient and mother reports no breakthrough seizures. She continues to take Trokendi  mg, 2 daily. She also takes branded Dilantin 200 mg in the morning and 330 mg at bedtime. Mother has noted increase intentional hand tremors evident when she eats or drinks. It is starting to bother her eating. No tremors at rest and less with posturing. Patient apparently fell last 8/23/2020 out of a chair and caught herself with her right hand. This caused pain and swelling. Saw her PCP 8/28/2020. Seen by orthopedics and had cast applied. X-ray revealed nondisplaced 5th metacarpal neck fracture. Generalized osteopenia. She continues to take Prozac 20 mg at bedtime for anxiety. This continues to offer benefit. Patient has had chronic issues with her right leg. Lower leg is decrease in size. She has a fused ankle. Worsening gait with tendency to drag the right foot and steady with a walker. Mother worried of risk of falling.      Outside reports reviewed: none    Review of Systems:    A comprehensive review of systems was performed:   Positive for incontinence, muscle aches, weakness, tremors, back pain, headaches, gait issues    Objective:     Visit Vitals  /82   Pulse 75   Temp 97.9 °F (36.6 °C)   Resp 20   SpO2 99%       PHYSICAL EXAM:    NEUROLOGICAL EXAM:    Appearance: The patient is well developed, well nourished, provides a coherent history and is in no acute distress. Mental Status: Oriented to time, place and person. Fluent, no aphasia or dysarthria. Mood and affect appropriate. Cranial Nerves:   II - XII were intact. Motor:  5/5 strength. Decrease side right lower leg but no overt weakness. Tight heel cord right ankle with limited range of motion. Unable to dorsiflex due to heel cord issues. Normal tone and no cogwheel rigidity. No pronator drift. Reflexes:   Deep tendon reflexes 1+/4 and symmetrical. Downgoing toes. Sensory:   Normal to cold and vibration. Gait:  Walks with right foot plantar flexed and tendency to drag but steady with a walker. No Romberg. Tremor:   Mild intentional and postural LUE tremors noted. No resting tremors. Cerebellar:  Intact FTN/JUDY/HTS. Assessment:   Complex partial seizures   Anxiety  Tremors  Right leg weakness  Therapeutic drug monitoring    Plan:   Neurological examination is unchanged. It is still nonfocal.  No new deficits. Seizure free. No indication currently to do any other neuroimaging. 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 No acute intracranial abnormality. Patient with known history of epilepsy. Check levels to medication to make sure tremors is not an adverse effect. Continue Trokendi  mg total at bedtime for now.  Continue Dilantin branded extended release 100 mg 2 in the morning and 3 in the evening plus Dilantin branded 30 mg extended release at bedtime for now. Adjusted pending results of levels. Continue Diastat for breakthrough seizures at home. Patient was advised regarding seizure precautions and lowering seizure threshold. Advised patient to sleep on time and 7-8 hours a night. Do not sleep deprived. Do not skip meals. Do not be dehydrated. Patient was also advised regarding safety. Do not do tub baths only showers. Do not work in heights unless harnessed or with supervision. Patient understood. Patient was also advised to continue calcium and vitamin D supplementation. Stable issues with anxiety. Continue Prozac 20 mg at bedtime. Mild intentional and postural mainly LUE tremors seen today. Need to assess for potential side effects from medication. If not, then trial of increase dose of Topiramate or other treatments for essential tremors. Exam reveals right lower leg atrophy likely secondary to deconditioning due to inability to move right frozen ankle with tight heel cords. Patient referred for home health to do OT and PT to maximize functioning, improve LE strength and stability. Check topiramate and free/total phenytoin levels today. Advised to see a dentist at least twice a year and do oral hygiene. All questions and concerns were answered. This note was created using voice recognition software. Despite editing, there may be syntax errors.

## 2020-09-17 LAB
PHENYTOIN FREE SERPL-MCNC: 3.6 UG/ML (ref 1–2)
PHENYTOIN SERPL-MCNC: 39.3 UG/ML (ref 10–20)
TOPIRAMATE SERPL-MCNC: 4.8 UG/ML (ref 2–25)

## 2020-09-22 RX ORDER — TOPIRAMATE 50 MG/1
50 CAPSULE, EXTENDED RELEASE ORAL DAILY
Qty: 30 CAP | Refills: 1 | Status: SHIPPED | OUTPATIENT
Start: 2020-09-22 | End: 2020-11-20 | Stop reason: SDUPTHER

## 2020-09-23 NOTE — PROGRESS NOTES
Patient advised to lower Dilantin by 30 mg and increase Trokendi by 50 mg daily. Recheck levels in 1 month.

## 2020-09-28 ENCOUNTER — TELEPHONE (OUTPATIENT)
Dept: NEUROLOGY | Age: 50
End: 2020-09-28

## 2020-11-13 ENCOUNTER — CLINICAL SUPPORT (OUTPATIENT)
Dept: FAMILY MEDICINE CLINIC | Age: 50
End: 2020-11-13
Payer: MEDICARE

## 2020-11-13 DIAGNOSIS — Z23 ENCOUNTER FOR IMMUNIZATION: Primary | ICD-10-CM

## 2020-11-13 PROCEDURE — 90686 IIV4 VACC NO PRSV 0.5 ML IM: CPT | Performed by: FAMILY MEDICINE

## 2020-11-13 NOTE — PATIENT INSTRUCTIONS
Vaccine Information Statement    Influenza (Flu) Vaccine (Inactivated or Recombinant): What You Need to Know    Many Vaccine Information Statements are available in [place] and other languages. See www.immunize.org/vis  Hojas de información sobre vacunas están disponibles en español y en muchos otros idiomas. Visite www.immunize.org/vis    1. Why get vaccinated? Influenza vaccine can prevent influenza (flu). Flu is a contagious disease that spreads around the [facility] every year, usually between October and May. Anyone can get the flu, but it is more dangerous for some people. Infants and young children, people 72years of age and older, pregnant women, and people with certain health conditions or a weakened immune system are at greatest risk of flu complications. Pneumonia, bronchitis, sinus infections and ear infections are examples of flu-related complications. If you have a medical condition, such as heart disease, cancer or diabetes, flu can make it worse. Flu can cause fever and chills, sore throat, muscle aches, fatigue, cough, headache, and runny or stuffy nose. Some people may have vomiting and diarrhea, though this is more common in children than adults. Each year thousands of people in the [facility] die from flu, and many more are hospitalized. Flu vaccine prevents millions of illnesses and flu-related visits to the doctor each year. 2. Influenza vaccines     CDC recommends everyone 10months of age and older get vaccinated every flu season. Children 6 months through 6years of age may need 2 doses during a single flu season. Everyone else needs only 1 dose each flu season. It takes about 2 weeks for protection to develop after vaccination. There are many flu viruses, and they are always changing. Each year a new flu vaccine is made to protect against three or four viruses that are likely to cause disease in the upcoming flu season.  Even when the vaccine doesnt exactly match these viruses, it may still provide some protection. Influenza vaccine does not cause flu. Influenza vaccine may be given at the same time as other vaccines. 3. Talk with your health care provider    Tell your vaccine provider if the person getting the vaccine:   Has had an allergic reaction after a previous dose of influenza vaccine, or has any severe, life-threatening allergies.  Has ever had Guillain-Barré Syndrome (also called GBS). In some cases, your health care provider may decide to postpone influenza vaccination to a future visit. People with minor illnesses, such as a cold, may be vaccinated. People who are moderately or severely ill should usually wait until they recover before getting influenza vaccine. Your health care provider can give you more information. 4. Risks of a reaction     Soreness, redness, and swelling where shot is given, fever, muscle aches, and headache can happen after influenza vaccine.  There may be a very small increased risk of Guillain-Barré Syndrome (GBS) after inactivated influenza vaccine (the flu shot). [facility] children who get the flu shot along with pneumococcal vaccine (PCV13), and/or DTaP vaccine at the same time might be slightly more likely to have a seizure caused by fever. Tell your health care provider if a child who is getting flu vaccine has ever had a seizure. People sometimes faint after medical procedures, including vaccination. Tell your provider if you feel dizzy or have vision changes or ringing in the ears. As with any medicine, there is a very remote chance of a vaccine causing a severe allergic reaction, other serious injury, or death. 5. What if there is a serious problem? An allergic reaction could occur after the vaccinated person leaves the clinic.  If you see signs of a severe allergic reaction (hives, swelling of the face and throat, difficulty breathing, a fast heartbeat, dizziness, or weakness), call 9-1-1 and get the person to the nearest hospital.    For other signs that concern you, call your health care provider. Adverse reactions should be reported to the Vaccine Adverse Event Reporting System (VAERS). Your health care provider will usually file this report, or you can do it yourself. Visit the VAERS website at www.vaers. [facility].gov or call 7-173.764.5545. VAERS is only for reporting reactions, and VAERS staff do not give medical advice. 6. The National Vaccine Injury Compensation Program    The [facility] Vaccine Injury Compensation Program (VICP) is a federal program that was created to compensate people who may have been injured by certain vaccines. Visit the VICP website at www.hrsa.gov/vaccinecompensation or call 3-231.817.4544 to learn about the program and about filing a claim. There is a time limit to file a claim for compensation. 7. How can I learn more?  Ask your health care provider.  Call your local or state health department.  Contact the Centers for Disease Control and Prevention (CDC):  - Call 1-628.867.6480 (5-040-CKP-INFO) or  - Visit CDCs influenza website at www.cdc.gov/flu    Vaccine Information Statement (Interim)  Inactivated Influenza Vaccine   8/15/2019  42 LEENA Finley [id number]   Department of Health and Human Services  Centers for Disease Control and Prevention    Office Use Only

## 2020-11-13 NOTE — PROGRESS NOTES
Brianna Chang is a 47 y.o. female who presents for routine immunizations. She denies any symptoms , reactions or allergies that would exclude them from being immunized today. Risks and adverse reactions were discussed and the VIS was given to them. All questions were addressed. She declined post injection observation. There were no reactions observed.     Jimmy Brennan LPN

## 2020-11-20 ENCOUNTER — TELEPHONE (OUTPATIENT)
Dept: NEUROLOGY | Age: 50
End: 2020-11-20

## 2020-11-20 DIAGNOSIS — G40.209 PARTIAL SYMPTOMATIC EPILEPSY WITH COMPLEX PARTIAL SEIZURES, NOT INTRACTABLE, WITHOUT STATUS EPILEPTICUS (HCC): Primary | ICD-10-CM

## 2020-11-20 RX ORDER — TOPIRAMATE 50 MG/1
50 CAPSULE, EXTENDED RELEASE ORAL DAILY
Qty: 90 CAP | Refills: 1 | Status: SHIPPED | OUTPATIENT
Start: 2020-11-20 | End: 2021-05-07 | Stop reason: SDUPTHER

## 2020-11-20 NOTE — TELEPHONE ENCOUNTER
----- Message from Anya Castro sent at 11/20/2020  9:29 AM EST -----  Regarding: Dr. Ulises Lloyd / telephone  Megan Robison (if not patient):Heather Espitia   Relationship of caller (if not patient):  mother  Best contact number(s): [phone]  Name of medication and dosage if known:Trokendi XR 50 mg capsule   Is patient out of this medication (yes/no): no   Pharmacy name: [address] [place] [address] listed in chart? (yes/no): yes   Pharmacy phone number: n/a  Date of last visit: 9/16/2020  Details to clarify the request: pt is afraid of running out during holiday weekend next week- PT will be out on Tuesday.

## 2020-12-07 DIAGNOSIS — Z12.31 VISIT FOR SCREENING MAMMOGRAM: Primary | ICD-10-CM

## 2020-12-17 DIAGNOSIS — G40.209 PARTIAL SYMPTOMATIC EPILEPSY WITH COMPLEX PARTIAL SEIZURES, NOT INTRACTABLE, WITHOUT STATUS EPILEPTICUS (HCC): ICD-10-CM

## 2020-12-23 RX ORDER — PHENYTOIN SODIUM 100 MG/1
CAPSULE, EXTENDED RELEASE ORAL
Qty: 450 CAP | Refills: 0 | Status: SHIPPED | OUTPATIENT
Start: 2020-12-23 | End: 2021-03-17

## 2020-12-23 RX ORDER — EXTENDED PHENYTOIN SODIUM 30 MG/1
CAPSULE ORAL
Qty: 90 CAP | Refills: 0 | Status: SHIPPED | OUTPATIENT
Start: 2020-12-23 | End: 2021-01-19 | Stop reason: DRUGHIGH

## 2021-01-19 ENCOUNTER — OFFICE VISIT (OUTPATIENT)
Dept: NEUROLOGY | Age: 51
End: 2021-01-19
Payer: MEDICARE

## 2021-01-19 VITALS
SYSTOLIC BLOOD PRESSURE: 122 MMHG | DIASTOLIC BLOOD PRESSURE: 70 MMHG | OXYGEN SATURATION: 98 % | HEART RATE: 72 BPM | RESPIRATION RATE: 20 BRPM

## 2021-01-19 DIAGNOSIS — Z51.81 THERAPEUTIC DRUG MONITORING: ICD-10-CM

## 2021-01-19 DIAGNOSIS — G40.209 PARTIAL SYMPTOMATIC EPILEPSY WITH COMPLEX PARTIAL SEIZURES, NOT INTRACTABLE, WITHOUT STATUS EPILEPTICUS (HCC): Primary | ICD-10-CM

## 2021-01-19 DIAGNOSIS — G40.209 PARTIAL SYMPTOMATIC EPILEPSY WITH COMPLEX PARTIAL SEIZURES, NOT INTRACTABLE, WITHOUT STATUS EPILEPTICUS (HCC): ICD-10-CM

## 2021-01-19 DIAGNOSIS — F41.9 ANXIETY: ICD-10-CM

## 2021-01-19 DIAGNOSIS — R25.1 TREMORS OF NERVOUS SYSTEM: ICD-10-CM

## 2021-01-19 DIAGNOSIS — R29.898 RIGHT LEG WEAKNESS: ICD-10-CM

## 2021-01-19 PROCEDURE — G8432 DEP SCR NOT DOC, RNG: HCPCS | Performed by: PSYCHIATRY & NEUROLOGY

## 2021-01-19 PROCEDURE — 3017F COLORECTAL CA SCREEN DOC REV: CPT | Performed by: PSYCHIATRY & NEUROLOGY

## 2021-01-19 PROCEDURE — G9899 SCRN MAM PERF RSLTS DOC: HCPCS | Performed by: PSYCHIATRY & NEUROLOGY

## 2021-01-19 PROCEDURE — 99215 OFFICE O/P EST HI 40 MIN: CPT | Performed by: PSYCHIATRY & NEUROLOGY

## 2021-01-19 PROCEDURE — G8427 DOCREV CUR MEDS BY ELIG CLIN: HCPCS | Performed by: PSYCHIATRY & NEUROLOGY

## 2021-01-19 PROCEDURE — G8417 CALC BMI ABV UP PARAM F/U: HCPCS | Performed by: PSYCHIATRY & NEUROLOGY

## 2021-01-19 NOTE — PROGRESS NOTES
Has had 1 seizure since her last visit, per mom she did well with the increase of the trokendi and decrease of the dilantin     Tremors- has decreased some since her last visit     Gait instability- they did therapy, AFO brace was recommended, has used it and it has helped

## 2021-01-19 NOTE — PROGRESS NOTES
NEUROLOGY CLINIC NOTE    Patient ID:  Ivon Cavazos  [id number]  27 y.o.  1970    Date of Visit:  January 19, 2021    Reason for Visit:  Seizures, anxiety, gait issues    Chief Complaint   Patient presents with    Follow-up     seizure, gait instability, tremors       History of Present Illness:     Patient Active Problem List    Diagnosis Date Noted    Elevated LDL cholesterol level 05/26/2020    Diarrhea 04/08/2019    Colitis 04/08/2019    NSAID long-term use 04/08/2019    Blood in stool 04/08/2019    Sepsis ([facility] [address] 04/07/2019    Severe obesity (BMI 35.0-39.9) 07/17/2018    Chronic migraine 12/19/2014    Mental developmental delay 12/19/2014    Seizure ([facility] [address] 12/03/2013     Past Medical History:   Diagnosis Date    Arthritis     Bladder incontinence     Chronic ear infection     Ill-defined condition     chronic hydrocephalus; no shunt, well managed    Seizure disorder [facility])       Past Surgical History:   Procedure Laterality Date    COLONOSCOPY N/A 4/10/2019    COLONOSCOPY performed by Momo León MD at [facility] ENDOSCOPY    COLONOSCOPY N/A 6/14/2019    COLONOSCOPY performed by Jennifer Myrick MD at [facility] ENDOSCOPY    HX APPENDECTOMY      HX OTHER SURGICAL  2002    remove a lypoma      Prior to Admission medications    Medication Sig Start Date End Date Taking? Authorizing Provider   Dilantin Extended 100 mg ER capsule Take 2 capsules by mouth every morning, take 3 capsules by mouth at bedtime. 12/23/20  Yes Marc Ornelas MD   Trokendi XR 50 mg capsule Take 1 Cap by mouth daily. In addition to 400 mg daily for a total dose of 450 mg daily  Indications: convulsive seizures 11/20/20  Yes Marc Ornelas MD   FLUoxetine (PROzac) 20 mg capsule Take 1 Cap by mouth nightly. 7/7/20  Yes Marc Ornelas MD   topiramate ER (Trokendi XR) 200 mg capsule Take 2 Caps by mouth nightly.  7/7/20  Yes Marc Ornelas MD   diazePAM (Diastat AcuDiaL) 5-7.5-10 mg kit Insert 10 mg into rectum daily as needed (seizures). 4/2/20  Yes Bridget Molina MD   fluticasone propionate (FLONASE) 50 mcg/actuation nasal spray USE 2 SPRAYS IN EACH NOSTRIL QD FOR ALLERGIES 5/9/19  Yes Provider, Historical   medroxyPROGESTERone (DEPO-PROVERA) 150 mg/mL injection INJECT 150MG INTO THE MUSCLE Q 12 WEEKS 4/22/19  Yes Provider, Historical   methenamine hippurate (HIPREX) 1 gram tablet Take 1 g by mouth two (2) times daily (with meals). Yes Provider, Historical   cranberry fruit extract (CRANBERRY PO) Take 1 Tab by mouth daily. Yes Provider, Historical   butalbital-acetaminophen-caffeine (FIORICET, ESGIC) -40 mg per tablet Take 1 Tab by mouth. Yes Provider, Historical   L. acidoph & paracasei- S therm- Bifido (JONNY-Q/RISAQUAD) 8 billion cell cap cap Take 1 Cap by mouth daily. 4/15/19  Yes Dimple Verduzco MD   mirabegron ER [facility]) 25 mg ER tablet Take 25 mg by mouth every Monday, Wednesday, Friday. Yes Provider, Historical   Dilantin 30 mg ER capsule Take 1 capsule by mouth at bedtime added to 300 MG. 12/23/20   Bridget Molina MD     Allergies   Allergen Reactions    Adhesive Tape-Silicones Other (comments)    Medrol [Methylprednisolone] Anxiety and Other (comments)      Social History     Tobacco Use    Smoking status: Never Smoker    Smokeless tobacco: Never Used   Substance Use Topics    Alcohol use: No      Family History   Problem Relation Age of Onset    Heart Disease Father         Subjective:      Rivka Jones is a 45 y.o. WF with history of epilepsy, migraine headaches, obesity, arthritis and mental developmental delay who is here for follow-up for seizures and is here for follow up. Patient was previously being seen by Dr. Romana Cardinal (neurology). She was last seen 1/24/2019. Note mentions patient having a history of congenital static encephalopathy. Patient was taking Dilantin 200 mg in the morning and 330 mg at bedtime.   Patient is also on Trokendi 400 mg daily. Plan then was to continue her medications. Phenytoin level was 27. Per mom and patient, she has been having increased seizures since April 20, 2019. She has had 10 seizures. Last seizure was 1 1/2 week PTC. Right arm and leg jerking with inability to respond. Lasting 3 1/2 minutes. Usual seizures are right side shakes but she is able to respond lasting 1 minute. Labs CBC and CMP done 5/30/2019 were unremarkable except for slight decrease in calcium at 8. 6.and slight increase in ALT at 33. Phenytoin level was 23.7. Review of records reveal patient was admitted at [facility] last 4/7/2019 to 4/15/2019 for sepsis in the setting of acute colitis and UTI. Neurology was consulted then for elevated phenytoin levels. No changes in regimen was made. 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 No acute intracranial abnormality. 4/03/2020, labs revealed free phenytoin of 2.1 (slight elevated) and topiramate 2.7 which is low therapeutic. No changes made to her regimen. Since the last visit on 9/16/2020, labs done revealed elevated phenytoin at 39.3 and free phenytoin at 3.6. Topiramate was 4.8. Patient and mother were advised to increase Trokendi XR by 50 mg daily and stop Dilantin 30 mg. Per mother patient had 1 breakthrough seizure 2 weeks after but none since then. She reports patient is more alert, less clumsy and less intensity of her tremors. She is undergoing physical therapy with improvement in her walking with less dragging of the right foot. She has also been provided with a right ankle-foot orthosis that allows her in to lift her foot off the ground. They just ordered this orthosis from [employer] and she was having issues at the heel of the foot with some improvement with padding. No new complaint. No falls.      Takes: Trokendi  mg daily total and Dilantin 200 mg in the morning and 300 mg at bedtime. Outside reports reviewed: labs    Review of Systems:    A comprehensive review of systems was performed:   Positive for incontinence, muscle aches, weakness, tremors, back pain, headaches, gait issues    Objective:     Visit Vitals  /70   Pulse 72   Resp 20   SpO2 98%       PHYSICAL EXAM:    NEUROLOGICAL EXAM:    Appearance: The patient is well developed, well nourished, provides a coherent history and is in no acute distress. Mental Status: Oriented to time, place and person. Fluent, no aphasia or dysarthria. Mood and affect appropriate. Cranial Nerves:   II - XII were intact. Motor:  5/5 strength. Decrease right lower leg but no overt weakness. Tight heel cord right ankle with limited range of motion. Unable to dorsiflex due to heel cord issues. Normal tone and no cogwheel rigidity. No pronator drift. Reflexes:   Deep tendon reflexes 1+/4 and symmetrical. Downgoing toes. Sensory:   Normal to cold and vibration. Gait:  Walks better with right AFO without a walker. No Romberg. Tremor:   Mild intentional and postural LUE tremors noted. No resting tremors. Cerebellar:  Intact FTN/JUDY/HTS. Assessment:   Complex partial seizures   Anxiety  Tremors  Right leg weakness  Therapeutic drug monitoring    Plan:   Neurological examination is unchanged. It is still nonfocal.  No new deficits. Seizure free since 10/2020. No indication currently to do any other neuroimaging. 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 No acute intracranial abnormality. Patient with known history of epilepsy. Continue Trokendi  mg total at bedtime for now. Continue Dilantin branded extended release 100 mg 2 in the morning and 3 in the evening for now. Adjustment pending results of levels.  Continue Diastat for breakthrough seizures at home. Patient was advised regarding seizure precautions and lowering seizure threshold. Advised patient to sleep on time and 7-8 hours a night. Do not sleep deprived. Do not skip meals. Do not be dehydrated. Patient was also advised regarding safety. Do not do tub baths only showers. Do not work in heights unless harnessed or with supervision. Patient understood. Patient was also advised to continue calcium and vitamin D supplementation. Stable issues with anxiety. Continue Prozac 20 mg at bedtime. Mild intentional and postural mainly LUE tremors seen today. Some improvement with Topiramate. Exam reveals right lower leg atrophy likely secondary to deconditioning due to inability to move right frozen ankle with tight heel cords. Continue home health OT and PT to maximize functioning, improve LE strength and stability. Discuss referral to orthotist for custom molded right AFO. They differed for now. Check topiramate and free/total phenytoin levels today. Advised to see a dentist at least twice a year and do oral hygiene. All questions and concerns were answered. This note was created using voice recognition software. Despite editing, there may be syntax errors.

## 2021-01-19 NOTE — LETTER
1/19/2021 Patient: Bowen Dominguez  
YOB: 1970 Date of Visit: 1/19/2021 Javad Poe NP 
[address] [facility] [address] [place] [address] [address] [employer] Dear Javad Poe NP, Thank you for referring Ms. Marcelina Blanco to [facility] for evaluation. My notes for this consultation are attached. If you have questions, please do not hesitate to call me. I look forward to following your patient along with you. Sincerely, Maddison Montana MD

## 2021-01-19 NOTE — PATIENT INSTRUCTIONS
Epilepsy: Care Instructions  Your Care Instructions     Epilepsy is a common condition that causes repeated seizures. The seizures are caused by bursts of electrical activity in the brain that aren't normal. Seizures may cause problems with muscle control, movement, speech, vision, or awareness. They can be scary. Epilepsy affects each person differently. Some people have only a few seizures. Others get them more often. If you know what triggers a seizure, you may be able to avoid having one. You can take medicines to control and reduce seizures. You and your doctor will need to find the right combination, schedule, and dose of medicine. This may take time and careful changes. Seizures may get worse and happen more often over time. Follow-up care is a key part of your treatment and safety. Be sure to make and go to all appointments, and call your doctor if you are having problems. It's also a good idea to know your test results and keep a list of the medicines you take. How can you care for yourself at home? · Be safe with medicines. Take your medicines exactly as prescribed. Call your doctor if you think you are having a problem with your medicine. · Make a treatment plan with your doctor. Be sure to follow your plan. · Try to identify and avoid things that may make you more likely to have a seizure. These may include:  ? Not getting enough sleep. ? Using drugs or alcohol. ? Being emotionally stressed. ? Skipping meals. · Keep a record of any seizures you have. Note the date, time of day, and any details about the seizure that you can remember. Your doctor can use this information to plan or adjust your medicine or other treatment. · Be sure that any doctor treating you for another condition knows that you have epilepsy. Each doctor should know what medicines you are taking, if any. · Wear a medical ID bracelet. You can buy this at most [employer].  If you have a seizure that leaves you unconscious or unable to speak for yourself, this bracelet will let those who are treating you know that you have epilepsy. · Talk to your doctor about whether it is safe for you to do certain activities, such as drive or swim. When should you call for help? Call 911 anytime you think you may need emergency care. For example, call if:    · A seizure does not stop as it normally does.     · You have new symptoms such as:  ? Numbness, tingling, or weakness on one side of your body or face. ? Vision changes. ? Trouble speaking or thinking clearly. Call your doctor now or seek immediate medical care if:    · You have a fever.     · You have a severe headache. Watch closely for changes in your health, and be sure to contact your doctor if:    · The normal pattern or features of your seizures change. Where can you learn more? Go to http://www.gray.com/  Enter X141 in the search box to learn more about \"Epilepsy: Care Instructions. \"  Current as of: November 20, 2019               Content Version: 12.6  © 1496-8460 [employer]. Care instructions adapted under license by [employer] (which disclaims liability or warranty for this information). If you have questions about a medical condition or this instruction, always ask your healthcare professional. [address] any warranty or liability for your use of this information.

## 2021-01-21 LAB
PHENYTOIN FREE SERPL-MCNC: 2.4 UG/ML (ref 1–2)
PHENYTOIN SERPL-MCNC: 27.9 UG/ML (ref 10–20)
TOPIRAMATE SERPL-MCNC: 4.6 UG/ML (ref 2–25)

## 2021-02-15 RX ORDER — FLUOXETINE HYDROCHLORIDE 20 MG/1
CAPSULE ORAL
Qty: 90 CAP | Refills: 1 | Status: SHIPPED | OUTPATIENT
Start: 2021-02-15 | End: 2021-08-16

## 2021-03-17 DIAGNOSIS — G40.209 PARTIAL SYMPTOMATIC EPILEPSY WITH COMPLEX PARTIAL SEIZURES, NOT INTRACTABLE, WITHOUT STATUS EPILEPTICUS (HCC): ICD-10-CM

## 2021-03-17 RX ORDER — PHENYTOIN SODIUM 100 MG/1
CAPSULE, EXTENDED RELEASE ORAL
Qty: 450 CAP | Refills: 1 | Status: SHIPPED | OUTPATIENT
Start: 2021-03-17 | End: 2021-09-13

## 2021-05-06 ENCOUNTER — TELEPHONE (OUTPATIENT)
Dept: NEUROLOGY | Age: 51
End: 2021-05-06

## 2021-05-06 DIAGNOSIS — G40.209 PARTIAL SYMPTOMATIC EPILEPSY WITH COMPLEX PARTIAL SEIZURES, NOT INTRACTABLE, WITHOUT STATUS EPILEPTICUS (HCC): ICD-10-CM

## 2021-05-06 NOTE — TELEPHONE ENCOUNTER
----- Message from Pablo Carranza sent at 5/6/2021  1:33 PM EDT -----  Regarding: /Refill  Medication Refill    Caller (if not patient):[facility] pharmacy technician      Relationship of caller (if not patient):      Best contact number(s):      Name of medication and dosage if known: prokenei XR Er capsules      Is patient out of this medication (yes/no):yes      Pharmacy name:[facility] pharmacy    Pharmacy listed in chart? (yes/no):yes  Pharmacy phone number: [phone]      Details to clarify the request: [facility] called in regards to a prescription request .      Pablo Carranza

## 2021-05-06 NOTE — TELEPHONE ENCOUNTER
----- Message from Butch Callejas sent at 5/6/2021  9:24 AM EDT -----  Regarding: Dr. Danial Cowden (if not patient): Kenzie Lobo Mom      Relationship of caller (if not patient):      Best contact number(s):[phone]      Name of medication and dosage if known: Amish      Is patient out of this medication (yes/no): yes      Pharmacy name: [facility] listed in chart? (yes/no): yes  Pharmacy phone number:      Details to clarify the request: as of Tomorrow pt will not have any Seizure Medication, would like to have a small supply until filled by [facility]

## 2021-05-07 RX ORDER — TOPIRAMATE 50 MG/1
50 CAPSULE, EXTENDED RELEASE ORAL DAILY
Qty: 30 CAP | Refills: 0 | Status: SHIPPED | OUTPATIENT
Start: 2021-05-07 | End: 2021-06-09 | Stop reason: SDUPTHER

## 2021-05-07 NOTE — TELEPHONE ENCOUNTER
Small supply of each dose of her medication was sent to the local pharmacy until the prior authorization has been competed. Mother called in the morning and she was advised we would send it in.

## 2021-05-11 ENCOUNTER — TELEPHONE (OUTPATIENT)
Dept: NEUROLOGY | Age: 51
End: 2021-05-11

## 2021-05-24 ENCOUNTER — OFFICE VISIT (OUTPATIENT)
Dept: NEUROLOGY | Age: 51
End: 2021-05-24
Payer: MEDICARE

## 2021-05-24 VITALS
HEART RATE: 72 BPM | WEIGHT: 228.9 LBS | BODY MASS INDEX: 38.09 KG/M2 | DIASTOLIC BLOOD PRESSURE: 70 MMHG | SYSTOLIC BLOOD PRESSURE: 114 MMHG

## 2021-05-24 DIAGNOSIS — F41.9 ANXIETY: ICD-10-CM

## 2021-05-24 DIAGNOSIS — Z51.81 THERAPEUTIC DRUG MONITORING: ICD-10-CM

## 2021-05-24 DIAGNOSIS — G40.209 PARTIAL SYMPTOMATIC EPILEPSY WITH COMPLEX PARTIAL SEIZURES, NOT INTRACTABLE, WITHOUT STATUS EPILEPTICUS (HCC): Primary | ICD-10-CM

## 2021-05-24 DIAGNOSIS — R29.898 RIGHT LEG WEAKNESS: ICD-10-CM

## 2021-05-24 DIAGNOSIS — R25.1 TREMORS OF NERVOUS SYSTEM: ICD-10-CM

## 2021-05-24 PROCEDURE — G8427 DOCREV CUR MEDS BY ELIG CLIN: HCPCS | Performed by: PSYCHIATRY & NEUROLOGY

## 2021-05-24 PROCEDURE — 99214 OFFICE O/P EST MOD 30 MIN: CPT | Performed by: PSYCHIATRY & NEUROLOGY

## 2021-05-24 PROCEDURE — G8417 CALC BMI ABV UP PARAM F/U: HCPCS | Performed by: PSYCHIATRY & NEUROLOGY

## 2021-05-24 PROCEDURE — G9899 SCRN MAM PERF RSLTS DOC: HCPCS | Performed by: PSYCHIATRY & NEUROLOGY

## 2021-05-24 PROCEDURE — 3017F COLORECTAL CA SCREEN DOC REV: CPT | Performed by: PSYCHIATRY & NEUROLOGY

## 2021-05-24 PROCEDURE — G8432 DEP SCR NOT DOC, RNG: HCPCS | Performed by: PSYCHIATRY & NEUROLOGY

## 2021-05-24 NOTE — LETTER
5/26/2021 Patient: Chelsie Brito  
YOB: 1970 Date of Visit: 5/24/2021 Kevin Smith NP 
[address] [place] [address] [address] [facility] [address] [address] [employer] Dear Kevin Smith NP, Thank you for referring Ms. Juliet Reardon to [facility] for evaluation. My notes for this consultation are attached. If you have questions, please do not hesitate to call me. I look forward to following your patient along with you. Sincerely, Sourav Ho MD

## 2021-05-24 NOTE — PROGRESS NOTES
NEUROLOGY CLINIC NOTE    Patient ID:  Judy Beck  [id number]  89 y.o.  1970    Date of Visit:  May 24, 2021    Reason for Visit:  Seizures, anxiety, gait issues    Chief Complaint   Patient presents with    Follow-up     seizures        History of Present Illness:     Patient Active Problem List    Diagnosis Date Noted    Elevated LDL cholesterol level 05/26/2020    Diarrhea 04/08/2019    Colitis 04/08/2019    NSAID long-term use 04/08/2019    Blood in stool 04/08/2019    Sepsis ([facility] [address] 04/07/2019    Severe obesity (BMI 35.0-39.9) 07/17/2018    Chronic migraine 12/19/2014    Mental developmental delay 12/19/2014    Seizure ([facility] [address] 12/03/2013     Past Medical History:   Diagnosis Date    Arthritis     Bladder incontinence     Chronic ear infection     Ill-defined condition     chronic hydrocephalus; no shunt, well managed    Seizure disorder [facility])       Past Surgical History:   Procedure Laterality Date    COLONOSCOPY N/A 4/10/2019    COLONOSCOPY performed by Brigida Beard MD at [facility] ENDOSCOPY    COLONOSCOPY N/A 6/14/2019    COLONOSCOPY performed by Judith Ng MD at [facility] ENDOSCOPY    HX APPENDECTOMY      HX OTHER SURGICAL  2002    remove a lypoma      Prior to Admission medications    Medication Sig Start Date End Date Taking? Authorizing Provider   topiramate ER (Trokendi XR) 200 mg capsule Take 2 Caps by mouth nightly. 5/7/21  Yes Hussein Hernandez MD   Trokendi XR 50 mg capsule Take 1 Cap by mouth daily. In addition to 400 mg daily for a total dose of 450 mg daily  Indications: convulsive seizures 5/7/21  Yes Hussein Hernandez MD   Dilantin Extended 100 mg ER capsule Take 2 capsules by mouth every morning, take 3 capsules by mouth at bedtime. 3/17/21  Yes Hussein Hernandez MD   FLUoxetine (PROzac) 20 mg capsule Take 1 capsule by mouth nightly.  2/15/21  Yes Hussein Hernandez MD   diazePAM (Diastat AcuDiaL) 5-7.5-10 mg kit Insert 10 mg into rectum daily as needed (seizures). 4/2/20  Yes Jess Botello MD   fluticasone propionate (FLONASE) 50 mcg/actuation nasal spray USE 2 SPRAYS IN EACH NOSTRIL QD FOR ALLERGIES 5/9/19  Yes Provider, Historical   medroxyPROGESTERone (DEPO-PROVERA) 150 mg/mL injection INJECT 150MG INTO THE MUSCLE Q 12 WEEKS 4/22/19  Yes Provider, Historical   methenamine hippurate (HIPREX) 1 gram tablet Take 1 g by mouth two (2) times daily (with meals). Yes Provider, Historical   cranberry fruit extract (CRANBERRY PO) Take 1 Tab by mouth daily. Yes Provider, Historical   butalbital-acetaminophen-caffeine (FIORICET, ESGIC) -40 mg per tablet Take 1 Tab by mouth. Yes Provider, Historical   L. acidoph & paracasei- S therm- Bifido (JONNY-Q/RISAQUAD) 8 billion cell cap cap Take 1 Cap by mouth daily. 4/15/19  Yes Jean Carlos Olson MD   mirabegron ER [facility]) 25 mg ER tablet Take 25 mg by mouth every Monday, Wednesday, Friday. Yes Provider, Historical     Allergies   Allergen Reactions    Adhesive Tape-Silicones Other (comments)    Medrol [Methylprednisolone] Anxiety and Other (comments)      Social History     Tobacco Use    Smoking status: Never Smoker    Smokeless tobacco: Never Used   Substance Use Topics    Alcohol use: No      Family History   Problem Relation Age of Onset    Heart Disease Father         Subjective:      Adan Kramer is a 45 y.o. WF with history of epilepsy, migraine headaches, obesity, arthritis and mental developmental delay who is here for follow-up for seizures and is here for follow up. Patient was previously being seen by Dr. Brittany Galeas (neurology). She was last seen 1/24/2019. Note mentions patient having a history of congenital static encephalopathy. Patient was taking Dilantin 200 mg in the morning and 330 mg at bedtime. Patient is also on Trokendi 400 mg daily. Plan then was to continue her medications. Phenytoin level was 27.      Per mom and patient, she has been having increased seizures since April 20, 2019. She has had 10 seizures. Last seizure was 1 1/2 week PTC. Right arm and leg jerking with inability to respond. Lasting 3 1/2 minutes. Usual seizures are right side shakes but she is able to respond lasting 1 minute. Labs CBC and CMP done 5/30/2019 were unremarkable except for slight decrease in calcium at 8. 6.and slight increase in ALT at 33. Phenytoin level was 23.7. Review of records reveal patient was [facility] [id number] last 4/7/2019 to 4/15/2019 for sepsis in the setting of acute colitis and UTI. Neurology was consulted then for elevated phenytoin levels. No changes in regimen was made. 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 No acute intracranial abnormality. 4/03/2020, labs revealed free phenytoin of 2.1 (slight elevated) and topiramate 2.7 which is low therapeutic. No changes made to her regimen. 9/16/2020, labs done revealed elevated phenytoin at 39.3 and free phenytoin at 3.6. Topiramate was 4.8. Patient and mother were advised to increase Trokendi XR by 50 mg daily and stop Dilantin 30 mg. Per mother patient had 1 breakthrough seizure 2 weeks after but none since then. Since the last visit on 1/19/2021, labs revealed phenytoin level of 27.9, free phenytoin level of 2.4 and topiramate level of 4.6. Patient remains to be seizure free. She apparently ran out of Trokendi and mother placed her back of 30 mg of Dilantin extra. She is now good with her prescription medications. Patient did finish physical therapy. She was provided AFO which she still has a tendency to drag.. No falls. Steady with a walker. No new complaint. Takes: Trokendi  mg daily total and Dilantin 200 mg in the morning and 300 mg at bedtime.      Outside reports reviewed: labs    Review of Systems:    A comprehensive review of systems was performed:   Positive for incontinence, muscle aches, weakness, tremors, back pain, headaches, gait issues    Objective:     Visit Vitals  /70   Pulse 72   Wt 103.8 kg (228 lb 14.4 oz)   BMI 38.09 kg/m²       PHYSICAL EXAM:    NEUROLOGICAL EXAM:    Appearance: The patient is well developed, well nourished, provides a coherent history and is in no acute distress. Mental Status: Oriented to time, place and person. Fluent, no aphasia or dysarthria. Mood and affect appropriate. Cranial Nerves:   II - XII were intact. Motor:  5/5 strength. Decrease right lower leg but no overt weakness. Tight heel cord right ankle with limited range of motion. Unable to dorsiflex due to heel cord issues. Normal tone and no cogwheel rigidity. No pronator drift. Reflexes:   Deep tendon reflexes 1+/4 and symmetrical. Downgoing toes. Sensory:   Normal to cold and vibration. Gait:  Walks better with right AFO without a walker. No Romberg. Tremor:   Mild intentional and postural LUE tremors noted. No resting tremors. Cerebellar:  Intact FTN/JUDY/HTS. Assessment:   Complex partial seizures   Anxiety  Tremors  Right leg weakness  Therapeutic drug monitoring    Plan:   Neurological examination is unchanged. It is still nonfocal.  No new deficits. Seizure free since 10/2020. No indication currently to do any other neuroimaging. 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 No acute intracranial abnormality. Patient with known history of epilepsy. Continue Trokendi  mg total at bedtime. Continue Dilantin branded extended release 100 mg 2 in the morning and 3 in the evening. Advised to check if Diastat is  or not for breakthrough seizures at home.  If it is  then I will prescribe her Nayzilam.  Patient was advised regarding seizure precautions and lowering seizure threshold. Advised patient to sleep on time and 7-8 hours a night. Do not sleep deprived. Do not skip meals. Do not be dehydrated. Patient was also advised regarding safety. Do not do tub baths only showers. Do not work in heights unless harnessed or with supervision. Patient understood. Patient was also advised to continue calcium and vitamin D supplementation. Stable issues with anxiety. Continue Prozac 20 mg at bedtime. Mild intentional and postural mainly LUE tremors seen today. Some improvement with Topiramate. Exam reveals right lower leg atrophy likely secondary to deconditioning due to inability to move right frozen ankle with tight heel cords. Continue exercises taught by home health OT and PT to maximize functioning, improve LE strength and stability. Discuss referral to orthotist for custom molded right AFO. They differed for now. Check topiramate and free/total phenytoin levels prior to next visit. Advised to see a dentist at least twice a year and do oral hygiene. All questions and concerns were answered. This note was created using voice recognition software. Despite editing, there may be syntax errors.

## 2021-06-09 DIAGNOSIS — G40.209 PARTIAL SYMPTOMATIC EPILEPSY WITH COMPLEX PARTIAL SEIZURES, NOT INTRACTABLE, WITHOUT STATUS EPILEPTICUS (HCC): ICD-10-CM

## 2021-06-10 RX ORDER — TOPIRAMATE 50 MG/1
50 CAPSULE, EXTENDED RELEASE ORAL DAILY
Qty: 30 CAPSULE | Refills: 5 | Status: SHIPPED | OUTPATIENT
Start: 2021-06-10 | End: 2021-11-15

## 2021-08-16 RX ORDER — FLUOXETINE HYDROCHLORIDE 20 MG/1
CAPSULE ORAL
Qty: 90 CAPSULE | Refills: 1 | Status: SHIPPED | OUTPATIENT
Start: 2021-08-16 | End: 2022-02-11

## 2021-09-13 DIAGNOSIS — G40.209 PARTIAL SYMPTOMATIC EPILEPSY WITH COMPLEX PARTIAL SEIZURES, NOT INTRACTABLE, WITHOUT STATUS EPILEPTICUS (HCC): ICD-10-CM

## 2021-09-13 RX ORDER — PHENYTOIN SODIUM 100 MG/1
CAPSULE, EXTENDED RELEASE ORAL
Qty: 450 CAPSULE | Refills: 1 | Status: SHIPPED | OUTPATIENT
Start: 2021-09-13 | End: 2022-03-15

## 2021-10-01 ENCOUNTER — TELEPHONE (OUTPATIENT)
Dept: NEUROLOGY | Age: 51
End: 2021-10-01

## 2021-11-13 DIAGNOSIS — G40.209 PARTIAL SYMPTOMATIC EPILEPSY WITH COMPLEX PARTIAL SEIZURES, NOT INTRACTABLE, WITHOUT STATUS EPILEPTICUS (HCC): ICD-10-CM

## 2021-11-15 RX ORDER — TOPIRAMATE 50 MG/1
CAPSULE, EXTENDED RELEASE ORAL
Qty: 30 CAPSULE | Refills: 4 | Status: SHIPPED | OUTPATIENT
Start: 2021-11-15 | End: 2022-04-18

## 2021-12-09 ENCOUNTER — OFFICE VISIT (OUTPATIENT)
Dept: NEUROLOGY | Age: 51
End: 2021-12-09
Payer: MEDICARE

## 2021-12-09 VITALS — RESPIRATION RATE: 20 BRPM | DIASTOLIC BLOOD PRESSURE: 84 MMHG | SYSTOLIC BLOOD PRESSURE: 130 MMHG

## 2021-12-09 DIAGNOSIS — F41.9 ANXIETY: ICD-10-CM

## 2021-12-09 DIAGNOSIS — Z51.81 THERAPEUTIC DRUG MONITORING: ICD-10-CM

## 2021-12-09 DIAGNOSIS — R25.1 TREMORS OF NERVOUS SYSTEM: ICD-10-CM

## 2021-12-09 DIAGNOSIS — G40.209 PARTIAL SYMPTOMATIC EPILEPSY WITH COMPLEX PARTIAL SEIZURES, NOT INTRACTABLE, WITHOUT STATUS EPILEPTICUS (HCC): Primary | ICD-10-CM

## 2021-12-09 DIAGNOSIS — R29.898 RIGHT LEG WEAKNESS: ICD-10-CM

## 2021-12-09 PROCEDURE — G9899 SCRN MAM PERF RSLTS DOC: HCPCS | Performed by: PSYCHIATRY & NEUROLOGY

## 2021-12-09 PROCEDURE — G8432 DEP SCR NOT DOC, RNG: HCPCS | Performed by: PSYCHIATRY & NEUROLOGY

## 2021-12-09 PROCEDURE — G8417 CALC BMI ABV UP PARAM F/U: HCPCS | Performed by: PSYCHIATRY & NEUROLOGY

## 2021-12-09 PROCEDURE — 99214 OFFICE O/P EST MOD 30 MIN: CPT | Performed by: PSYCHIATRY & NEUROLOGY

## 2021-12-09 PROCEDURE — G8427 DOCREV CUR MEDS BY ELIG CLIN: HCPCS | Performed by: PSYCHIATRY & NEUROLOGY

## 2021-12-09 PROCEDURE — 3017F COLORECTAL CA SCREEN DOC REV: CPT | Performed by: PSYCHIATRY & NEUROLOGY

## 2021-12-09 RX ORDER — MIDAZOLAM 5 MG/.1ML
5 SPRAY NASAL
Qty: 2 EACH | Refills: 0 | Status: SHIPPED | OUTPATIENT
Start: 2021-12-09

## 2021-12-09 NOTE — PROGRESS NOTES
NEUROLOGY CLINIC NOTE    Patient ID:  Odalis Ferreira  [id number]  62 y.o.  1970    Date of Visit:  December 9, 2021    Reason for Visit:  Seizures, anxiety, gait issues    Chief Complaint   Patient presents with    Follow-up     seizures,tremors,gait instability        History of Present Illness:     Patient Active Problem List    Diagnosis Date Noted    Elevated LDL cholesterol level 05/26/2020    Diarrhea 04/08/2019    Colitis 04/08/2019    NSAID long-term use 04/08/2019    Blood in stool 04/08/2019    Sepsis ([facility] [address] 04/07/2019    Severe obesity (BMI 35.0-39.9) 07/17/2018    Chronic migraine 12/19/2014    Mental developmental delay 12/19/2014    Seizure ([facility] [address] 12/03/2013     Past Medical History:   Diagnosis Date    Arthritis     Bladder incontinence     Chronic ear infection     Ill-defined condition     chronic hydrocephalus; no shunt, well managed    Seizure disorder [facility])       Past Surgical History:   Procedure Laterality Date    COLONOSCOPY N/A 4/10/2019    COLONOSCOPY performed by Elmer Lowry MD at \[facility]\"" ENDOSCOPY    COLONOSCOPY N/A 6/14/2019    COLONOSCOPY performed by Vane Khan MD at \[facility]"" ENDOSCOPY    HX APPENDECTOMY      HX OTHER SURGICAL  2002    remove a lypoma      Prior to Admission medications    Medication Sig Start Date End Date Taking? Authorizing Provider   Trokendi XR 50 mg capsule Take 1 capsule by mouth daily. In addition to 400mg daily for a total dose of 450mg daily for convulsive seizures. 11/15/21  Yes Neetu Ayon MD   Dilantin Extended 100 mg ER capsule Take 2 capsules by mouth every morning, take 3 capsules by mouth at bedtime. 9/13/21  Yes Neetu Ayon MD   FLUoxetine (PROzac) 20 mg capsule Take 1 capsule by mouth nightly. 8/16/21  Yes Neetu Ayon MD   topiramate ER (Trokendi XR) 200 mg capsule Take 2 Capsules by mouth nightly.  6/10/21  Yes Neetu Ayon MD   diazePAM (Diastat AcuDiaL) 5-7.5-10 mg kit Insert 10 mg into rectum daily as needed (seizures). 4/2/20  Yes Nay Medrano MD   fluticasone propionate (FLONASE) 50 mcg/actuation nasal spray USE 2 SPRAYS IN EACH NOSTRIL QD FOR ALLERGIES 5/9/19  Yes Provider, Historical   medroxyPROGESTERone (DEPO-PROVERA) 150 mg/mL injection INJECT 150MG INTO THE MUSCLE Q 12 WEEKS 4/22/19  Yes Provider, Historical   methenamine hippurate (HIPREX) 1 gram tablet Take 1 g by mouth two (2) times daily (with meals). Yes Provider, Historical   cranberry fruit extract (CRANBERRY PO) Take 1 Tab by mouth daily. Yes Provider, Historical   butalbital-acetaminophen-caffeine (FIORICET, ESGIC) -40 mg per tablet Take 1 Tab by mouth. Yes Provider, Historical   L. acidoph & paracasei- S therm- Bifido (JONNY-Q/RISAQUAD) 8 billion cell cap cap Take 1 Cap by mouth daily. 4/15/19  Yes Abdullahi Blackburn MD   mirabegron ER [facility]) 25 mg ER tablet Take 25 mg by mouth every Monday, Wednesday, Friday. Yes Provider, Historical     Allergies   Allergen Reactions    Adhesive Tape-Silicones Other (comments)    Medrol [Methylprednisolone] Anxiety and Other (comments)      Social History     Tobacco Use    Smoking status: Never Smoker    Smokeless tobacco: Never Used   Substance Use Topics    Alcohol use: No      Family History   Problem Relation Age of Onset    Heart Disease Father         Subjective:      Davis Dubois is a 45 y.o. WF with history of epilepsy, migraine headaches, obesity, arthritis and mental developmental delay who is here for follow-up for seizures and is here for follow up. Patient was previously being seen by Dr. Joslyn York (neurology). She was last seen 1/24/2019. Note mentions patient having a history of congenital static encephalopathy. Patient was taking Dilantin 200 mg in the morning and 330 mg at bedtime. Patient is also on Trokendi 400 mg daily. Plan then was to continue her medications. Phenytoin level was 27.      Per mom and patient, she has been having increased seizures since April 20, 2019. She has had 10 seizures. Last seizure was 1 1/2 week PTC. Right arm and leg jerking with inability to respond. Lasting 3 1/2 minutes. Usual seizures are right side shakes but she is able to respond lasting 1 minute. Labs CBC and CMP done 5/30/2019 were unremarkable except for slight decrease in calcium at 8. 6.and slight increase in ALT at 33. Phenytoin level was 23.7. Review of records reveal [id number] last 4/7/2019 to 4/15/2019 for sepsis in the setting of acute colitis and UTI. Neurology was consulted then for elevated phenytoin levels. No changes in regimen was made. 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 No acute intracranial abnormality. 4/03/2020, labs revealed free phenytoin of 2.1 (slight elevated) and topiramate 2.7 which is low therapeutic. No changes made to her regimen. 9/16/2020, labs done revealed elevated phenytoin at 39.3 and free phenytoin at 3.6. Topiramate was 4.8. Patient and mother were advised to increase Trokendi XR by 50 mg daily and stop Dilantin 30 mg. Per mother patient had 1 breakthrough seizure 2 weeks after but none since then. 1/19/2021, labs revealed phenytoin level of 27.9, free phenytoin level of 2.4 and topiramate level of 4.6. Since the last visit on 5/24/2021, patient remains to be seizure free. She continues to take Trokendi  mg total per day and Dilantin 200 in the morning and 300 mg at bedtime. Denies any side effects. Patient is not wearing AFO due to discomfort. No falls. Steady with a walker. No new complaint.       Outside reports reviewed: none    Review of Systems:    A comprehensive review of systems was performed:   Positive for incontinence, muscle aches, weakness, tremors, back pain, headaches, gait issues    Objective: Visit Vitals  /84   Resp 20       PHYSICAL EXAM:    NEUROLOGICAL EXAM:    Appearance: The patient is well developed, well nourished, provides a coherent history and is in no acute distress. Mental Status: Oriented to time, place and person. Fluent, no aphasia or dysarthria. Mood and affect appropriate. Cranial Nerves:   II - XII were intact. Motor:  5/5 strength. Decrease right lower leg but no overt weakness. Tight heel cord right ankle with limited range of motion. Unable to dorsiflex due to heel cord issues. Normal tone and no cogwheel rigidity. No pronator drift. Reflexes:   Deep tendon reflexes 1+/4 and symmetrical. Downgoing toes. Sensory:   Normal to cold and vibration. Gait:  Walks better with right AFO without a walker. No Romberg. Tremor:   Mild intentional and postural LUE tremors noted. No resting tremors. Cerebellar:  Intact FTN/JUDY/HTS. Assessment:   Complex partial seizures   Anxiety  Tremors  Right leg weakness  Therapeutic drug monitoring    Plan:   Neurological examination is unchanged. It is still nonfocal.  No new deficits. Seizure free since 10/2020. No indication currently to do any other neuroimaging. 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 No acute intracranial abnormality. Patient with known history of epilepsy. Continue Trokendi  mg total at bedtime. Prescription was provided. Continue Dilantin branded extended release 100 mg 2 in the morning and 3 in the evening. Trial of Nayzilam for abortive therapy for breakthrough seizures. Prescription was provided. Patient was advised regarding seizure precautions and lowering seizure threshold. Advised patient to sleep on time and 7-8 hours a night. Do not sleep deprived. Do not skip meals. Do not be dehydrated. Patient was also advised regarding safety.  Do not do tub baths only showers. Do not work in heights unless harnessed or with supervision. Patient understood. Patient was also advised to continue calcium and vitamin D supplementation. Stable issues with anxiety. Continue Prozac 20 mg at bedtime. Mild intentional and postural mainly LUE tremors seen today. Some improvement with Topiramate. Exam reveals right lower leg atrophy likely secondary to deconditioning due to inability to move right frozen ankle with tight heel cords. Continue exercises taught by home health OT and PT to maximize functioning, improve LE strength and stability. Discuss referral to orthotist for custom molded right AFO. They differed for now. Check topiramate and free/total phenytoin levels today. Advised to see a dentist at least twice a year and do oral hygiene. All questions and concerns were answered. This note was created using voice recognition software. Despite editing, there may be syntax errors.

## 2021-12-11 LAB
PHENYTOIN FREE SERPL-MCNC: 2.8 UG/ML (ref 1–2)
PHENYTOIN SERPL-MCNC: 36.5 UG/ML (ref 10–20)
TOPIRAMATE SERPL-MCNC: 3.5 UG/ML (ref 2–25)

## 2022-02-07 ENCOUNTER — TELEPHONE (OUTPATIENT)
Dept: FAMILY MEDICINE CLINIC | Age: 52
End: 2022-02-07

## 2022-02-07 RX ORDER — ALBUTEROL SULFATE 90 UG/1
2 AEROSOL, METERED RESPIRATORY (INHALATION)
Qty: 18 G | Refills: 1 | Status: SHIPPED | OUTPATIENT
Start: 2022-02-07

## 2022-02-07 NOTE — TELEPHONE ENCOUNTER
Mother is calling wanting to see if something could be called in for congestion states pt has covid but she is getting over this

## 2022-02-07 NOTE — TELEPHONE ENCOUNTER
Covid is a virus. Should treat symptoms with mucinex and nasal saline flushes.   No antibiotic is recommended

## 2022-02-07 NOTE — TELEPHONE ENCOUNTER
Mother states her and Phillis Rinne are wheezing. She states you gave her a prescription for a inhaler last fall and it really helped and how they felt last fall is how they are feeling now they have pretty much gotten over the covid part.

## 2022-02-07 NOTE — TELEPHONE ENCOUNTER
Mother states she doesn't want a antibiotic she would like a inhaler for the cough and to break up the mucus. Mother also states they have been using mucinex and nasal saline flushes  Margaux Perez

## 2022-02-11 RX ORDER — FLUOXETINE HYDROCHLORIDE 20 MG/1
CAPSULE ORAL
Qty: 90 CAPSULE | Refills: 1 | Status: SHIPPED | OUTPATIENT
Start: 2022-02-11 | End: 2022-08-14

## 2022-02-23 ENCOUNTER — TELEPHONE (OUTPATIENT)
Dept: FAMILY MEDICINE CLINIC | Age: 52
End: 2022-02-23

## 2022-02-23 NOTE — TELEPHONE ENCOUNTER
Karma from [address] [place] [address] called to get the status of a Certificate of Medical Necessity for Ms. Espitia.  It was faxed over on February 19, 2022. Please call.

## 2022-02-24 NOTE — TELEPHONE ENCOUNTER
Mother notified that pt needs to come before form is filled out.  Mother states she will call back to set up appointment she was busy at the current time

## 2022-03-08 ENCOUNTER — OFFICE VISIT (OUTPATIENT)
Dept: FAMILY MEDICINE CLINIC | Age: 52
End: 2022-03-08
Payer: MEDICARE

## 2022-03-08 VITALS
SYSTOLIC BLOOD PRESSURE: 130 MMHG | BODY MASS INDEX: 25.69 KG/M2 | RESPIRATION RATE: 16 BRPM | DIASTOLIC BLOOD PRESSURE: 86 MMHG | HEIGHT: 65 IN | TEMPERATURE: 98 F | HEART RATE: 70 BPM | OXYGEN SATURATION: 97 % | WEIGHT: 154.2 LBS

## 2022-03-08 DIAGNOSIS — R79.9 ABNORMAL FINDING OF BLOOD CHEMISTRY, UNSPECIFIED: ICD-10-CM

## 2022-03-08 DIAGNOSIS — Z00.00 MEDICARE ANNUAL WELLNESS VISIT, SUBSEQUENT: ICD-10-CM

## 2022-03-08 DIAGNOSIS — R09.81 NASAL CONGESTION: ICD-10-CM

## 2022-03-08 DIAGNOSIS — J18.9 PNEUMONIA DUE TO INFECTIOUS ORGANISM, UNSPECIFIED LATERALITY, UNSPECIFIED PART OF LUNG: ICD-10-CM

## 2022-03-08 DIAGNOSIS — R27.9 UNSPECIFIED LACK OF COORDINATION: ICD-10-CM

## 2022-03-08 DIAGNOSIS — R51.9 NONINTRACTABLE EPISODIC HEADACHE, UNSPECIFIED HEADACHE TYPE: Primary | ICD-10-CM

## 2022-03-08 PROCEDURE — G0439 PPPS, SUBSEQ VISIT: HCPCS | Performed by: FAMILY MEDICINE

## 2022-03-08 PROCEDURE — G8427 DOCREV CUR MEDS BY ELIG CLIN: HCPCS | Performed by: FAMILY MEDICINE

## 2022-03-08 PROCEDURE — G0463 HOSPITAL OUTPT CLINIC VISIT: HCPCS | Performed by: FAMILY MEDICINE

## 2022-03-08 PROCEDURE — 3017F COLORECTAL CA SCREEN DOC REV: CPT | Performed by: FAMILY MEDICINE

## 2022-03-08 PROCEDURE — G9899 SCRN MAM PERF RSLTS DOC: HCPCS | Performed by: FAMILY MEDICINE

## 2022-03-08 PROCEDURE — 99213 OFFICE O/P EST LOW 20 MIN: CPT | Performed by: FAMILY MEDICINE

## 2022-03-08 PROCEDURE — G8510 SCR DEP NEG, NO PLAN REQD: HCPCS | Performed by: FAMILY MEDICINE

## 2022-03-08 PROCEDURE — G8417 CALC BMI ABV UP PARAM F/U: HCPCS | Performed by: FAMILY MEDICINE

## 2022-03-08 RX ORDER — BUTALBITAL, ACETAMINOPHEN AND CAFFEINE 50; 325; 40 MG/1; MG/1; MG/1
1 TABLET ORAL
Qty: 30 TABLET | Refills: 1 | Status: SHIPPED | OUTPATIENT
Start: 2022-03-08

## 2022-03-08 RX ORDER — CIPROFLOXACIN AND DEXAMETHASONE 3; 1 MG/ML; MG/ML
4 SUSPENSION/ DROPS AURICULAR (OTIC) 2 TIMES DAILY
Qty: 7.5 ML | Refills: 0 | Status: SHIPPED | OUTPATIENT
Start: 2022-03-08 | End: 2022-03-18

## 2022-03-08 RX ORDER — FLUTICASONE PROPIONATE 50 MCG
SPRAY, SUSPENSION (ML) NASAL
Qty: 1 EACH | Refills: 5 | Status: SHIPPED | OUTPATIENT
Start: 2022-03-08

## 2022-03-08 RX ORDER — FLUTICASONE PROPIONATE 50 MCG
SPRAY, SUSPENSION (ML) NASAL
Qty: 1 EACH | Refills: 5 | Status: SHIPPED | OUTPATIENT
Start: 2022-03-08 | End: 2022-03-08 | Stop reason: SDUPTHER

## 2022-03-08 RX ORDER — BUTALBITAL, ACETAMINOPHEN AND CAFFEINE 50; 325; 40 MG/1; MG/1; MG/1
1 TABLET ORAL
Qty: 30 TABLET | Refills: 1 | Status: SHIPPED | OUTPATIENT
Start: 2022-03-08 | End: 2022-03-08 | Stop reason: SDUPTHER

## 2022-03-08 NOTE — PROGRESS NOTES
1. Have you been to the ER, urgent care clinic since your last visit? Hospitalized since your last visit? Yes, [facility] in [place] for double ear infection and pneumonia in her lungs. 3/01/2022    2. Have you seen or consulted any other health care providers outside of the [address] [place] [address] since your last visit? Include any pap smears or colon screening.  No    Health Maintenance Due   Topic Date Due    COVID-19 Vaccine (1) Never done    DTaP/Tdap/Td series (1 - Tdap) Never done    Medicare Yearly Exam  Never done    Cervical cancer screen  05/13/2019    Shingrix Vaccine Age 50> (1 of 2) Never done    Depression Screen  08/28/2021    Flu Vaccine (1) 09/01/2021    Breast Cancer Screen Mammogram  10/24/2021     Chief Complaint   Patient presents with   [facility] ED Follow-up

## 2022-03-08 NOTE — PROGRESS NOTES
Chief Complaint   Patient presents with   [facility] ED Follow-up     Pt was seen at [facility], was diagnosed with pneumonia and bilateral ear infection. Pt was given a Francisco Rinks, has recovered. Pt and her family had COVID at the end of January, no lingering symptoms other than fatigue. Pt has c. diff a few years ago, is very cautious with antibiotics. Subjective: (As above and below)     Chief Complaint   Patient presents with    ED Follow-up     she is a 45y.o. year old female who presents for evaluation. Reviewed PmHx, RxHx, FmHx, SocHx, AllgHx and updated in chart. Review of Systems - negative except as listed above    Objective:     Vitals:    03/08/22 1313   BP: 130/86   Pulse: 70   Resp: 16   Temp: 98 °F (36.7 °C)   TempSrc: Oral   SpO2: 97%   Weight: 154 lb 3.2 oz (69.9 kg)   Height: 5' 5\" (1.651 m)     Physical Examination: General appearance - alert, well appearing, and in no distress  Mental status - normal mood, behavior, speech, dress, motor activity, and thought processes  Ears - small amount of fluid right ear  Chest - decreased air entry noted right base  Heart - normal rate, regular rhythm, normal S1, S2, no murmurs, rubs, clicks or gallops  Musculoskeletal - no joint tenderness, deformity or swelling  Extremities - peripheral pulses normal, no pedal edema, no clubbing or cyanosis    Assessment/ Plan:   1. Nonintractable episodic headache, unspecified headache type  -take as needed  - butalbital-acetaminophen-caffeine (FIORICET, ESGIC) -40 mg per tablet; Take 1 Tablet by mouth every six (6) hours as needed for Headache. Dispense: 30 Tablet; Refill: 1    2. Nasal congestion  -continue use  - fluticasone propionate (FLONASE) 50 mcg/actuation nasal spray; USE 2 SPRAYS IN EACH NOSTRIL QD FOR ALLERGIES  Dispense: 1 Each; Refill: 5    3. Pneumonia due to infectious organism, unspecified laterality, unspecified part of lung  -resolved    4.  Medicare annual wellness visit, subsequent  -see below       I have discussed the diagnosis with the patient and the intended plan as seen in the above orders. The patient has received an after-visit summary and questions were answered concerning future plans. Medication Side Effects and Warnings were discussed with patient: yes  Patient Labs were reviewed: yes  Patient Past Records were reviewed:  yes    Kassy Meadows M.D. This is the Subsequent Medicare Annual Wellness Exam, performed 12 months or more after the Initial AWV or the last Subsequent AWV    I have reviewed the patient's medical history in detail and updated the computerized patient record. Assessment/Plan   Education and counseling provided:  Are appropriate based on today's review and evaluation    1. Nonintractable episodic headache, unspecified headache type  -     butalbital-acetaminophen-caffeine (FIORICET, ESGIC) -40 mg per tablet; Take 1 Tablet by mouth every six (6) hours as needed for Headache., Normal, Disp-30 Tablet, R-1  2. Nasal congestion  -     fluticasone propionate (FLONASE) 50 mcg/actuation nasal spray; USE 2 SPRAYS IN EACH NOSTRIL QD FOR ALLERGIES, Normal, Disp-1 Each, R-5  3. Pneumonia due to infectious organism, unspecified laterality, unspecified part of lung  4. Medicare annual wellness visit, subsequent       Depression Risk Factor Screening     3 most recent PHQ Screens 3/8/2022   Little interest or pleasure in doing things Not at all   Feeling down, depressed, irritable, or hopeless Not at all   Total Score PHQ 2 0       Alcohol & Drug Abuse Risk Screen    Do you average more than 1 drink per night or more than 7 drinks a week:  No    On any one occasion in the past three months have you have had more than 3 drinks containing alcohol:  No          Functional Ability and Level of Safety    Hearing: Hearing is good. Activities of Daily Living:   The home contains: handrails, grab bars and ahower chair, walker, chair lift to basement   Patient needs help with:  transportation, shopping, preparing meals, laundry, housework, managing medications, managing money and walking      Ambulation: with difficulty, uses a walker     Fall Risk:  No flowsheet data found. Pt has had a few falls, uses a walker, right side weaker. Abuse Screen:  Patient is not abused       Cognitive Screening    Has your family/caregiver stated any concerns about your memory: no     Health Maintenance Due     Health Maintenance Due   Topic Date Due    COVID-19 Vaccine (1) Never done    DTaP/Tdap/Td series (1 - Tdap) Never done    Shingrix Vaccine Age 50> (1 of 2) Never done    Flu Vaccine (1) 09/01/2021    Breast Cancer Screen Mammogram  10/24/2021       Patient Care Team   Patient Care Team:  Katia Cody NP as PCP - General (Pediatric Medicine)  Katia Cody NP as PCP - [address] [place] [address] [facility]  Júnior Scherer MD as Physician (Neurology)    History     Patient Active Problem List   Diagnosis Code    Seizure [facility]) R56.9    Chronic migraine [id number]    Mental developmental delay F81.9    Severe obesity (BMI 35.0-39. 9) E66.01    Sepsis ([facility] [address] A41.9    Diarrhea R19.7    Colitis K52.9    NSAID long-term use Z79.1    Blood in stool K92.1    Elevated LDL cholesterol level E78.00     Past Medical History:   Diagnosis Date    Arthritis     Bladder incontinence     Chronic ear infection     Ill-defined condition     chronic hydrocephalus; no shunt, well managed    Seizure disorder [facility])       Past Surgical History:   Procedure Laterality Date    COLONOSCOPY N/A 4/10/2019    COLONOSCOPY performed by Pascual Harman MD at [facility] ENDOSCOPY    COLONOSCOPY N/A 6/14/2019    COLONOSCOPY performed by Eugenia Huston MD at [facility] ENDOSCOPY    HX APPENDECTOMY      HX OTHER SURGICAL  2002    remove a lypoma     Current Outpatient Medications   Medication Sig Dispense Refill    butalbital-acetaminophen-caffeine (FIORICET, ESGIC) -40 mg per tablet Take 1 Tablet by mouth every six (6) hours as needed for Headache. 30 Tablet 1    fluticasone propionate (FLONASE) 50 mcg/actuation nasal spray USE 2 SPRAYS IN EACH NOSTRIL QD FOR ALLERGIES 1 Each 5    FLUoxetine (PROzac) 20 mg capsule Take 1 capsule by mouth nightly. 90 Capsule 1    albuterol (PROVENTIL HFA, VENTOLIN HFA, PROAIR HFA) 90 mcg/actuation inhaler Take 2 Puffs by inhalation every four (4) hours as needed for Wheezing. 18 g 1    topiramate ER (Trokendi XR) 200 mg capsule Take 2 Capsules by mouth nightly. 60 Capsule 5    midazolam (Nayzilam) 5 mg/spray (0.1 mL) spry 5 mg by Nasal route daily as needed (seizure). 2 Each 0    Trokendi XR 50 mg capsule Take 1 capsule by mouth daily. In addition to 400mg daily for a total dose of 450mg daily for convulsive seizures. 30 Capsule 4    Dilantin Extended 100 mg ER capsule Take 2 capsules by mouth every morning, take 3 capsules by mouth at bedtime. 450 Capsule 1    medroxyPROGESTERone (DEPO-PROVERA) 150 mg/mL injection INJECT 150MG INTO THE MUSCLE Q 12 WEEKS  4    methenamine hippurate (HIPREX) 1 gram tablet Take 1 g by mouth two (2) times daily (with meals).  L. acidoph & paracasei- S therm- Bifido (JONNY-Q/RISAQUAD) 8 billion cell cap cap Take 1 Cap by mouth daily. 30 Cap 0    mirabegron ER (MYRBETRIQ) 25 mg ER tablet Take 25 mg by mouth every Monday, Wednesday, Friday.  cranberry fruit extract (CRANBERRY PO) Take 1 Tab by mouth daily.  (Patient not taking: Reported on 3/8/2022)       Allergies   Allergen Reactions    Adhesive Tape-Silicones Other (comments)    Medrol [Methylprednisolone] Anxiety and Other (comments)       Family History   Problem Relation Age of Onset    Heart Disease Father      Social History     Tobacco Use    Smoking status: Never Smoker    Smokeless tobacco: Never Used   Substance Use Topics    Alcohol use: No         Star MD Griffin

## 2022-03-08 NOTE — PATIENT INSTRUCTIONS

## 2022-03-15 DIAGNOSIS — G40.209 PARTIAL SYMPTOMATIC EPILEPSY WITH COMPLEX PARTIAL SEIZURES, NOT INTRACTABLE, WITHOUT STATUS EPILEPTICUS (HCC): ICD-10-CM

## 2022-03-15 RX ORDER — PHENYTOIN SODIUM 100 MG/1
CAPSULE, EXTENDED RELEASE ORAL
Qty: 450 CAPSULE | Refills: 1 | Status: SHIPPED | OUTPATIENT
Start: 2022-03-15 | End: 2022-09-12

## 2022-03-16 ENCOUNTER — APPOINTMENT (OUTPATIENT)
Dept: FAMILY MEDICINE CLINIC | Age: 52
End: 2022-03-16

## 2022-03-16 DIAGNOSIS — R79.9 ABNORMAL FINDING OF BLOOD CHEMISTRY, UNSPECIFIED: ICD-10-CM

## 2022-03-16 DIAGNOSIS — J18.9 PNEUMONIA DUE TO INFECTIOUS ORGANISM, UNSPECIFIED LATERALITY, UNSPECIFIED PART OF LUNG: ICD-10-CM

## 2022-03-16 DIAGNOSIS — R27.9 UNSPECIFIED LACK OF COORDINATION: ICD-10-CM

## 2022-03-16 LAB
ALBUMIN SERPL-MCNC: 3.7 G/DL (ref 3.5–5)
ALBUMIN/GLOB SERPL: 1.1 {RATIO} (ref 1.1–2.2)
ALP SERPL-CCNC: 100 U/L (ref 45–117)
ALT SERPL-CCNC: 32 U/L (ref 12–78)
ANION GAP SERPL CALC-SCNC: 5 MMOL/L (ref 5–15)
AST SERPL-CCNC: 25 U/L (ref 15–37)
BILIRUB SERPL-MCNC: 0.4 MG/DL (ref 0.2–1)
BUN SERPL-MCNC: 12 MG/DL (ref 6–20)
BUN/CREAT SERPL: 17 (ref 12–20)
CALCIUM SERPL-MCNC: 8.7 MG/DL (ref 8.5–10.1)
CHLORIDE SERPL-SCNC: 110 MMOL/L (ref 97–108)
CHOLEST SERPL-MCNC: 244 MG/DL
CO2 SERPL-SCNC: 23 MMOL/L (ref 21–32)
CREAT SERPL-MCNC: 0.71 MG/DL (ref 0.55–1.02)
ERYTHROCYTE [DISTWIDTH] IN BLOOD BY AUTOMATED COUNT: 12.5 % (ref 11.5–14.5)
EST. AVERAGE GLUCOSE BLD GHB EST-MCNC: 97 MG/DL
GLOBULIN SER CALC-MCNC: 3.4 G/DL (ref 2–4)
GLUCOSE SERPL-MCNC: 93 MG/DL (ref 65–100)
HBA1C MFR BLD: 5 % (ref 4–5.6)
HCT VFR BLD AUTO: 46.7 % (ref 35–47)
HDLC SERPL-MCNC: 46 MG/DL
HDLC SERPL: 5.3 {RATIO} (ref 0–5)
HGB BLD-MCNC: 15.2 G/DL (ref 11.5–16)
LDLC SERPL CALC-MCNC: 166 MG/DL (ref 0–100)
MCH RBC QN AUTO: 34.1 PG (ref 26–34)
MCHC RBC AUTO-ENTMCNC: 32.5 G/DL (ref 30–36.5)
MCV RBC AUTO: 104.7 FL (ref 80–99)
NRBC # BLD: 0 K/UL (ref 0–0.01)
NRBC BLD-RTO: 0 PER 100 WBC
PLATELET # BLD AUTO: 191 K/UL (ref 150–400)
PMV BLD AUTO: 10.6 FL (ref 8.9–12.9)
POTASSIUM SERPL-SCNC: 4 MMOL/L (ref 3.5–5.1)
PROT SERPL-MCNC: 7.1 G/DL (ref 6.4–8.2)
RBC # BLD AUTO: 4.46 M/UL (ref 3.8–5.2)
SODIUM SERPL-SCNC: 138 MMOL/L (ref 136–145)
TRIGL SERPL-MCNC: 160 MG/DL (ref ?–150)
TSH SERPL DL<=0.05 MIU/L-ACNC: 1.18 UIU/ML (ref 0.36–3.74)
VLDLC SERPL CALC-MCNC: 32 MG/DL
WBC # BLD AUTO: 5.1 K/UL (ref 3.6–11)

## 2022-03-17 NOTE — PROGRESS NOTES
Cholesterol has increased further, recommend adding a low dose cholesterol medication. Please advise if pt agrees. All other labs are within normal limits.    Please inform

## 2022-03-17 NOTE — PROGRESS NOTES
verified by Jade Cornelius. Results reviewed. She would like to speak with the pt's neurologist before agreeing to a cholesterol Rx.

## 2022-03-18 PROBLEM — K52.9 COLITIS: Status: ACTIVE | Noted: 2019-04-08

## 2022-03-18 PROBLEM — A41.9 SEPSIS (HCC): Status: ACTIVE | Noted: 2019-04-07

## 2022-03-19 PROBLEM — K92.1 BLOOD IN STOOL: Status: ACTIVE | Noted: 2019-04-08

## 2022-03-19 PROBLEM — R19.7 DIARRHEA: Status: ACTIVE | Noted: 2019-04-08

## 2022-03-19 PROBLEM — Z79.1 NSAID LONG-TERM USE: Status: ACTIVE | Noted: 2019-04-08

## 2022-03-19 PROBLEM — E78.00 ELEVATED LDL CHOLESTEROL LEVEL: Status: ACTIVE | Noted: 2020-05-26

## 2022-03-23 ENCOUNTER — TELEPHONE (OUTPATIENT)
Dept: FAMILY MEDICINE CLINIC | Age: 52
End: 2022-03-23

## 2022-03-23 NOTE — TELEPHONE ENCOUNTER
Rosalia from [facility] delivered called to get paperwork on the pt last visit     [phone] (phone)    [phone] (fax)

## 2022-04-16 DIAGNOSIS — G40.209 PARTIAL SYMPTOMATIC EPILEPSY WITH COMPLEX PARTIAL SEIZURES, NOT INTRACTABLE, WITHOUT STATUS EPILEPTICUS (HCC): ICD-10-CM

## 2022-04-18 RX ORDER — TOPIRAMATE 50 MG/1
CAPSULE, EXTENDED RELEASE ORAL
Qty: 30 CAPSULE | Refills: 11 | Status: SHIPPED | OUTPATIENT
Start: 2022-04-18 | End: 2022-06-23

## 2022-06-23 ENCOUNTER — TELEPHONE (OUTPATIENT)
Dept: NEUROLOGY | Age: 52
End: 2022-06-23

## 2022-06-23 DIAGNOSIS — G40.209 PARTIAL SYMPTOMATIC EPILEPSY WITH COMPLEX PARTIAL SEIZURES, NOT INTRACTABLE, WITHOUT STATUS EPILEPTICUS (HCC): ICD-10-CM

## 2022-06-23 RX ORDER — TOPIRAMATE 50 MG/1
CAPSULE, EXTENDED RELEASE ORAL
Qty: 30 CAPSULE | Refills: 11 | Status: SHIPPED | OUTPATIENT
Start: 2022-06-23

## 2022-06-23 RX ORDER — TOPIRAMATE 200 MG/1
CAPSULE, EXTENDED RELEASE ORAL
Qty: 60 CAPSULE | Refills: 5 | Status: SHIPPED | OUTPATIENT
Start: 2022-06-23 | End: 2022-07-08 | Stop reason: SDUPTHER

## 2022-06-23 NOTE — TELEPHONE ENCOUNTER
Patients mom stated her daughter is completely out of her seizure medicine TROKENDI and she also needs it to be approved again by the doctor. The pharmacy that she uses is the [facility] [phone]. The mom also stated the medication is very expensive if she does not get it approved. Patients mother would like a call back from the nurse or doctor.

## 2022-06-24 NOTE — TELEPHONE ENCOUNTER
Please call patient mother to discuss her medication for her seizures. Her daughter is completley out of this medication.

## 2022-06-24 NOTE — TELEPHONE ENCOUNTER
Contacted pt's mother she has refills of medication however it is requiring a PA again. PA Specialist is working on form to submit. Pt's mother was able to get some medication to last her until Monday night and hopefully we will have a decision by then so [facility] can send her the Rx in the mail. Pt's mother has verbalized understanding.

## 2022-07-08 ENCOUNTER — TELEPHONE (OUTPATIENT)
Dept: NEUROLOGY | Age: 52
End: 2022-07-08

## 2022-07-08 DIAGNOSIS — G40.209 PARTIAL SYMPTOMATIC EPILEPSY WITH COMPLEX PARTIAL SEIZURES, NOT INTRACTABLE, WITHOUT STATUS EPILEPTICUS (HCC): ICD-10-CM

## 2022-07-08 NOTE — TELEPHONE ENCOUNTER
Patients mom is calling regarding her daughters refill on her seizure medication TROKENDI 200 mg. Patient is out of her medicine.     Please contact

## 2022-07-08 NOTE — TELEPHONE ENCOUNTER
Returned her call. Resent another Rx for her medication to [facility] so that she would have enough to last her a while. Pt's mom has been notified.

## 2022-08-14 RX ORDER — FLUOXETINE HYDROCHLORIDE 20 MG/1
CAPSULE ORAL
Qty: 90 CAPSULE | Refills: 1 | Status: SHIPPED | OUTPATIENT
Start: 2022-08-14

## 2022-08-18 ENCOUNTER — TELEPHONE (OUTPATIENT)
Dept: FAMILY MEDICINE CLINIC | Age: 52
End: 2022-08-18

## 2022-08-18 NOTE — TELEPHONE ENCOUNTER
Maria M Grimes w/ At [facility]  [phone]    Maria M Grimes is wanting to know if NP Polo can Follow pt for home health care orders; please call Maria M Grimes back with any questions or concerns       Thank You

## 2022-08-19 ENCOUNTER — NURSE TRIAGE (OUTPATIENT)
Dept: OTHER | Facility: CLINIC | Age: 52
End: 2022-08-19

## 2022-08-19 NOTE — TELEPHONE ENCOUNTER
Limited triage due to patient not being during call. Received call from [facility] with Red Flag Complaint. Subjective: Caller states \"HTN and swelling\"     Current Symptoms: Pt recently discharge from rehab facility, arrived home 8/18. This morning BP read 150/90, hadn't taken BP med when this was taken. Bilateral leg swelling from knee down since she has been in rehab. Has been wearing compression socks x 2 days. States the socks is improving the swelling. Surgeon concerned the BP med is causing the edema. Caller did not realize pt had appt 8/25. States she will keep that appt. Will call back on Monday if there are any changes and to update on BP. Onset: a few weeks ago; unchanged    Associated Symptoms: reduced activity    Pain Severity: 0/10; Temperature: denies fever    What has been tried: None    LMP: NA Pregnant: NA    Recommended disposition: See in Office Within 2 Weeks    Care advice provided, patient verbalizes understanding; denies any other questions or concerns; instructed to call back for any new or worsening symptoms. Patient/caller agrees to follow-up with PCP     Attention Provider: Thank you for allowing me to participate in the care of your patient. The patient was connected to triage in response to information provided to the [facility]. Please do not respond through this encounter as the response is not directed to a shared pool.     Reason for Disposition   Systolic BP >= 942 OR Diastolic >= 80, and is taking BP medications    Protocols used: Blood Pressure - High-ADULT-OH

## 2022-08-19 NOTE — TELEPHONE ENCOUNTER
Dominique Dolan states pt was discharged from rehab to home health she doesn't think ortho will follow for home health since its been a lot while since she seen them

## 2022-08-19 NOTE — TELEPHONE ENCOUNTER
Stephy/ At [facility]    Tereso Gamble is calling and stated that home health has been open today and is requesting:    -Skilled nursing was out today to see pt; Please call Tereso Gamble back with any questions or concerns     -PT and OT will go out and evaluate pt next (not sure what day but will be out and making a call as well regarding the pt)     Thank You

## 2022-08-19 NOTE — TELEPHONE ENCOUNTER
They have seen her and treated for fracture. She should have orders written by them or by her discharging doctor from rehab. I have no idea what kind of orders she is needing as I have not seen her in two years AND she appears to have been admitted for an orthopedic complaint.

## 2022-08-25 ENCOUNTER — VIRTUAL VISIT (OUTPATIENT)
Dept: FAMILY MEDICINE CLINIC | Age: 52
End: 2022-08-25

## 2022-08-25 DIAGNOSIS — Z91.199 NO-SHOW FOR APPOINTMENT: Primary | ICD-10-CM

## 2022-08-25 RX ORDER — OXYCODONE HYDROCHLORIDE 5 MG/1
TABLET ORAL
COMMUNITY
Start: 2022-08-17

## 2022-08-25 RX ORDER — AMLODIPINE BESYLATE 5 MG/1
TABLET ORAL
COMMUNITY
Start: 2022-08-17 | End: 2022-09-02 | Stop reason: SINTOL

## 2022-08-25 RX ORDER — MEDROXYPROGESTERONE ACETATE 150 MG/ML
INJECTION, SUSPENSION INTRAMUSCULAR
COMMUNITY
Start: 2022-07-21

## 2022-09-02 ENCOUNTER — VIRTUAL VISIT (OUTPATIENT)
Dept: FAMILY MEDICINE CLINIC | Age: 52
End: 2022-09-02
Payer: MEDICAID

## 2022-09-02 DIAGNOSIS — Z91.81 HISTORY OF FALL: ICD-10-CM

## 2022-09-02 DIAGNOSIS — Z09 HOSPITAL DISCHARGE FOLLOW-UP: Primary | ICD-10-CM

## 2022-09-02 DIAGNOSIS — Z91.81: ICD-10-CM

## 2022-09-02 DIAGNOSIS — Z98.890 S/P ORIF (OPEN REDUCTION INTERNAL FIXATION) FRACTURE: ICD-10-CM

## 2022-09-02 DIAGNOSIS — Z87.81 S/P ORIF (OPEN REDUCTION INTERNAL FIXATION) FRACTURE: ICD-10-CM

## 2022-09-02 DIAGNOSIS — S72.001A CLOSED DISPLACED FRACTURE OF RIGHT FEMORAL NECK (HCC): ICD-10-CM

## 2022-09-02 PROCEDURE — G8427 DOCREV CUR MEDS BY ELIG CLIN: HCPCS | Performed by: NURSE PRACTITIONER

## 2022-09-02 PROCEDURE — G0463 HOSPITAL OUTPT CLINIC VISIT: HCPCS | Performed by: NURSE PRACTITIONER

## 2022-09-02 PROCEDURE — G9899 SCRN MAM PERF RSLTS DOC: HCPCS | Performed by: NURSE PRACTITIONER

## 2022-09-02 PROCEDURE — 99213 OFFICE O/P EST LOW 20 MIN: CPT | Performed by: NURSE PRACTITIONER

## 2022-09-02 PROCEDURE — G8432 DEP SCR NOT DOC, RNG: HCPCS | Performed by: NURSE PRACTITIONER

## 2022-09-02 PROCEDURE — 3017F COLORECTAL CA SCREEN DOC REV: CPT | Performed by: NURSE PRACTITIONER

## 2022-09-02 RX ORDER — ASPIRIN 81 MG/1
81 TABLET ORAL DAILY
COMMUNITY

## 2022-09-02 RX ORDER — ASCORBIC ACID 500 MG
500 TABLET ORAL DAILY
COMMUNITY

## 2022-09-02 NOTE — PROGRESS NOTES
Douglas Peterson (: 1970) is a 45 y.o. female, established patient, here for evaluation of the following chief complaint(s):   Follow-up (Post Surgery)       ASSESSMENT/PLAN:  Below is the assessment and plan developed based on review of pertinent history, labs, studies, and medications. 1. Hospital discharge follow-up  2. Closed displaced fracture of right femoral neck ([address]  3. S/P ORIF (open reduction internal fixation) fracture  4. At high risk for falls per Cathlene Loron fall risk assessment scale  5. History of fall    Doing well and healing well since home. Continue care with ortho and neurology as planned. Fall precautions reviewed. Continue home health for PT per ortho orders. Will have her follow up in office in about 6 weeks. Return in about 6 weeks (around 10/14/2022), or if symptoms worsen or fail to improve, for routine follow up. SUBJECTIVE/OBJECTIVE:  HPI    Was admitted to [address] [place] [address] 22-22  Discharged to [facility]  Discharge Summary below from [facility]:    \"Admitted after fall and now with oblique displaced fracture of the left femoral neck, Nondisplaced transverse subcapital hairline fracture right femoral neck. Right Femoral Neck Fracture, s/p ORIF POD1 related to fall - frequent falls at home related to bilateral feet congenital abnormality and now Right foot drop, was undergoing therapy for this prior to fall and anticipating an orthotic to assist (has not received yet)   Per Ortho  (POD #1 ORIF right femoral neck fracture  -OOB with PT today BID  -WBAT  -resume lovenox for DVT prophylaxis  -Teds/foot pumps for DVT prophylaxis  -Patient instructed on ankle pumps and wiggling toes  -Multi-modal pain protocol     DC planning:  Home with home health v. SNF pending PT eval  WBAT  81mg ASA BID x4 weeks for dvt prophylaxis at discharge  DC dressing in 72hr  F/U clinic 2 weeks for staple removal).     - Morphine 2 mg changed to breakthrough only  - Oxycodone 5 mg/7.5 mg mod to severe every 4 hours   - Change Tylenol to scheduled 975 mg every 8 (to allow for daily Fiorcet APAP)\"    Per mother:  She was in rehab for two weeks  She was discharge from SNF to home, 8/18/22. She had follow up with ortho, DR Lorna Gupta on 8/18/22 as well. Next follow up 9/15/22  Mom says that her surgical area has healed great. She is limited to weight bearing less than 20 lbs  She is getting around with Wheelchair to commade and chair, and using walker to transfer and not putting much weight on right leg. Mother says she is doing well. Using advil and tylenol and really only needing oxycodone at night to rest.   When she was in rehab, her BP was elevated, they put her on amlodipine. She then had some swelling in legs. Told that she could wean off of the amlodipine and monitor BP at home. BP at home has been fine off the amlodipine off for two weeks. 118/78 by home health nurse. Has Home health PT/OT   Nursing home health completed yesterday   Mom notes that she has not fallen since home.   They are being very cautious     Review of Systems   Gen: no fatigue, fever, chills  Eyes: no excessive tearing, itching, or discharge  Nose: no rhinorrhea, no sinus pain  Mouth: no oral lesions, no sore throat  Resp: no shortness of breath, no wheezing, no cough  CV: no chest pain, no paroxysmal nocturnal dyspnea  Abd: no nausea, no heartburn, no diarrhea, no constipation, no abdominal pain  Neuro: no headaches, no syncope or presyncopal episodes      No data recorded     Physical Exam    [INSTRUCTIONS:  \"[x]\" Indicates a positive item  \"[]\" Indicates a negative item  -- DELETE ALL ITEMS NOT EXAMINED]    Constitutional: [x] Appears well-developed and well-nourished [x] No apparent distress      [] Abnormal -     Mental status: [x] Alert and awake  [x] Oriented to person/place/time [x] Able to follow commands    [] Abnormal -     Eyes:   EOM    [x]  Normal    [] Abnormal -   Sclera  [x]  Normal    [] Abnormal - Discharge [x]  None visible   [] Abnormal -     HENT: [x] Normocephalic, atraumatic  [] Abnormal -   [x] Mouth/Throat: Mucous membranes are moist    External Ears [x] Normal  [] Abnormal -    Neck: [x] No visualized mass [] Abnormal -     Pulmonary/Chest: [x] Respiratory effort normal   [x] No visualized signs of difficulty breathing or respiratory distress        [] Abnormal -      Musculoskeletal:           [x] Normal range of motion of neck        [] Abnormal -     Neurological:        [x] No Facial Asymmetry (Cranial nerve 7 motor function) (limited exam due to video visit)          [x] No gaze palsy        [] Abnormal -          Skin:        [x] No significant exanthematous lesions or discoloration noted on facial skin         [] Abnormal -            Psychiatric:       [x] Normal Affect [] Abnormal -        [x] No Hallucinations        Sunshine Espitia, was evaluated through a synchronous (real-time) audio-video encounter. The patient (or guardian if applicable) is aware that this is a billable service, which includes applicable co-pays. This Virtual Visit was conducted with patient's (and/or legal guardian's) consent. The visit was conducted pursuant to the emergency declaration under the [facility], [address] [place] [address] authority and the [employer] and [employer] General Act. Patient identification was verified, and a caregiver was present when appropriate. The patient was located at: Home: [address] [place] [id number]  The provider was located at: Facility ([facility] He Jaime  [address]       An electronic signature was used to authenticate this note.   -- Vivian Casper NP

## 2022-09-02 NOTE — PROGRESS NOTES
Chief Complaint   Patient presents with    Follow-up     Post Surgery     1. Have you been to the ER, urgent care clinic since your last visit? Hospitalized since your last visit? Yes    2. Have you seen or consulted any other health care providers outside of the [address] [place] [address] since your last visit? Include any pap smears or colon screening.  Orthopaedic      Health Maintenance Due   Topic Date Due    COVID-19 Vaccine (1) Never done    DTaP/Tdap/Td series (1 - Tdap) Never done    Shingrix Vaccine Age 50> (1 of 2) Never done    Flu Vaccine (1) 09/01/2022

## 2022-09-08 ENCOUNTER — TELEPHONE (OUTPATIENT)
Dept: FAMILY MEDICINE CLINIC | Age: 52
End: 2022-09-08

## 2022-09-08 NOTE — TELEPHONE ENCOUNTER
Rosalia from home care was following up on a certificate of medical necessity that was to be faxed back, for incontinence supplies. Documents are scanned in to patient's chart and appear to have been signed by [facility]. Papers were printed and faxed to the number provided, below. No further comments or questions at the time of call.     Fax: [phone]

## 2022-09-11 DIAGNOSIS — G40.209 PARTIAL SYMPTOMATIC EPILEPSY WITH COMPLEX PARTIAL SEIZURES, NOT INTRACTABLE, WITHOUT STATUS EPILEPTICUS (HCC): ICD-10-CM

## 2022-09-12 RX ORDER — PHENYTOIN SODIUM 100 MG/1
CAPSULE, EXTENDED RELEASE ORAL
Qty: 450 CAPSULE | Refills: 3 | Status: SHIPPED | OUTPATIENT
Start: 2022-09-12

## 2022-11-11 DIAGNOSIS — R09.81 NASAL CONGESTION: ICD-10-CM

## 2022-11-11 RX ORDER — FLUTICASONE PROPIONATE 50 MCG
SPRAY, SUSPENSION (ML) NASAL
Qty: 16 G | Refills: 4 | Status: SHIPPED | OUTPATIENT
Start: 2022-11-11

## 2022-11-15 ENCOUNTER — OFFICE VISIT (OUTPATIENT)
Dept: NEUROLOGY | Age: 52
End: 2022-11-15
Payer: MEDICARE

## 2022-11-15 VITALS
SYSTOLIC BLOOD PRESSURE: 132 MMHG | DIASTOLIC BLOOD PRESSURE: 76 MMHG | HEIGHT: 65 IN | HEART RATE: 77 BPM | WEIGHT: 230 LBS | RESPIRATION RATE: 18 BRPM | OXYGEN SATURATION: 96 % | BODY MASS INDEX: 38.32 KG/M2

## 2022-11-15 DIAGNOSIS — G40.209 PARTIAL SYMPTOMATIC EPILEPSY WITH COMPLEX PARTIAL SEIZURES, NOT INTRACTABLE, WITHOUT STATUS EPILEPTICUS (HCC): Primary | ICD-10-CM

## 2022-11-15 DIAGNOSIS — R25.1 TREMORS OF NERVOUS SYSTEM: ICD-10-CM

## 2022-11-15 DIAGNOSIS — F41.9 ANXIETY: ICD-10-CM

## 2022-11-15 DIAGNOSIS — R29.898 RIGHT LEG WEAKNESS: ICD-10-CM

## 2022-11-15 PROCEDURE — G9899 SCRN MAM PERF RSLTS DOC: HCPCS | Performed by: PSYCHIATRY & NEUROLOGY

## 2022-11-15 PROCEDURE — 3017F COLORECTAL CA SCREEN DOC REV: CPT | Performed by: PSYCHIATRY & NEUROLOGY

## 2022-11-15 PROCEDURE — G8417 CALC BMI ABV UP PARAM F/U: HCPCS | Performed by: PSYCHIATRY & NEUROLOGY

## 2022-11-15 PROCEDURE — G8427 DOCREV CUR MEDS BY ELIG CLIN: HCPCS | Performed by: PSYCHIATRY & NEUROLOGY

## 2022-11-15 PROCEDURE — G8510 SCR DEP NEG, NO PLAN REQD: HCPCS | Performed by: PSYCHIATRY & NEUROLOGY

## 2022-11-15 PROCEDURE — 99214 OFFICE O/P EST MOD 30 MIN: CPT | Performed by: PSYCHIATRY & NEUROLOGY

## 2022-11-15 NOTE — LETTER
11/15/2022    Patient: Walker Thibodeaux   YOB: 1970   Date of Visit: 11/15/2022     Arcelia Oconnell NP  [address]  [address] [address]  Via In Basket    Dear Arcelia Oconnell NP,      Thank you for referring Ms. Scar Constantino to [facility] for evaluation. My notes for this consultation are attached. If you have questions, please do not hesitate to call me. I look forward to following your patient along with you.       Sincerely,    Kamini Mcdonnell MD

## 2022-11-15 NOTE — PROGRESS NOTES
Chief Complaint   Patient presents with    Seizure     1 year follow up. Patient's mother reports patient  had 1 seizure 8/20/2022 that lasted about 30-40 seconds.          Visit Vitals  /76   Pulse 77   Resp 18   Ht 5' 5\" (1.651 m)   Wt 104.3 kg (230 lb)   SpO2 96%   BMI 38.27 kg/m²

## 2022-11-15 NOTE — PROGRESS NOTES
NEUROLOGY CLINIC NOTE    Patient ID:  Jeana Stein  [id number]  57 y.o.  1970    Date of Visit:  November 15, 2022    Reason for Visit:  Seizures, anxiety, gait issues    Chief Complaint   Patient presents with    Seizure     1 year follow up. Patient's mother reports patient  had 1 seizure 8/20/2022 that lasted about 30-40 seconds. History of Present Illness:     Patient Active Problem List    Diagnosis Date Noted    Elevated LDL cholesterol level 05/26/2020    Diarrhea 04/08/2019    Colitis 04/08/2019    NSAID long-term use 04/08/2019    Blood in stool 04/08/2019    Sepsis ([facility] [address] 04/07/2019    Severe obesity (BMI 35.0-39.9) 07/17/2018    Chronic migraine 12/19/2014    Mental developmental delay 12/19/2014    Seizure ([facility] [address] 12/03/2013     Past Medical History:   Diagnosis Date    Arthritis     Bladder incontinence     Chronic ear infection     Ill-defined condition     chronic hydrocephalus; no shunt, well managed    Seizure disorder [facility])       Past Surgical History:   Procedure Laterality Date    COLONOSCOPY N/A 4/10/2019    COLONOSCOPY performed by Alton Randhawa MD at [facility] ENDOSCOPY    COLONOSCOPY N/A 6/14/2019    COLONOSCOPY performed by Dustin Ceron MD at [facility] ENDOSCOPY    HX APPENDECTOMY      HX OTHER SURGICAL  2002    remove a lypoma      Prior to Admission medications    Medication Sig Start Date End Date Taking? Authorizing Provider   fluticasone propionate (FLONASE) 50 mcg/actuation nasal spray Instill 2 sprays in each nostril daily for allergies. 11/11/22  Yes Eveline Banks MD   Dilantin Extended 100 mg ER capsule Take 2 capsules by mouth every morning, and, take 3 capsules by mouth at bedtime. 9/12/22  Yes Jamie Raymond MD   ascorbic acid, vitamin C, (VITAMIN C) 500 mg tablet Take 500 mg by mouth daily. Yes Provider, Historical   aspirin delayed-release 81 mg tablet Take 81 mg by mouth daily.    Yes Provider, Historical   FLUoxetine (PROzac) 20 mg capsule Take 1 capsule by mouth nightly. 8/14/22  Yes Aquilino Romero MD   topiramate ER (Trokendi XR) 200 mg capsule TAKE 2 CAPSULES BY MOUTH EVERY NIGHT 7/8/22  Yes Aquilino Romero MD   Trokendi XR 50 mg capsule TAKE 1 CAPSULE BY MOUTH DAILY IN ADDITION  MG DAILY FOR A TOTAL DOSE  MG DAILY INDICATIONS: CONVULSIVE SEIZURES 6/23/22  Yes Aquilino Romero MD   butalbital-acetaminophen-caffeine (FIORICET, ESGIC) -40 mg per tablet Take 1 Tablet by mouth every six (6) hours as needed for Headache. 3/8/22  Yes Gianfranco Banks MD   albuterol (PROVENTIL HFA, VENTOLIN HFA, PROAIR HFA) 90 mcg/actuation inhaler Take 2 Puffs by inhalation every four (4) hours as needed for Wheezing. 2/7/22  Yes Jacy Kennedy NP   medroxyPROGESTERone (DEPO-PROVERA) 150 mg/mL injection INJECT 150MG INTO THE MUSCLE Q 12 WEEKS 4/22/19  Yes Provider, Historical   methenamine hippurate (HIPREX) 1 gram tablet Take 1 g by mouth two (2) times daily (with meals). Yes Provider, Historical   L. acidoph & paracasei- S therm- Bifido (JONNY-Q/RISAQUAD) 8 billion cell cap cap Take 1 Cap by mouth daily. 4/15/19  Yes Maria Guadalupe Benedict MD   mirabegron ER [facility]) 25 mg ER tablet Take 25 mg by mouth every Monday, Wednesday, Friday. Yes Provider, Historical   oxyCODONE IR (ROXICODONE) 5 mg immediate release tablet TAKE 1 TABLET BY MOUTH EVERY 6 HOURS FOR 7 DAYS AS NEEDED FOR SEVERE PAIN  Patient not taking: Reported on 11/15/2022 8/17/22   Provider, Historical   medroxyPROGESTERone (DEPO-PROVERA) 150 mg/mL syrg ADMINISTER 1 ML IN THE MUSCLE EVERY 3 MONTHS  Patient not taking: Reported on 11/15/2022 7/21/22   Provider, Historical   midazolam (Nayzilam) 5 mg/spray (0.1 mL) spry 5 mg by Nasal route daily as needed (seizure). Patient not taking: Reported on 11/15/2022 12/9/21   Aquilino Romero MD   cranberry fruit extract (CRANBERRY PO) Take 1 Tablet by mouth daily.   Patient not taking: Reported on 11/15/2022    Provider, Historical     Allergies   Allergen Reactions    Adhesive Tape-Silicones Other (comments)    Medrol [Methylprednisolone] Anxiety and Other (comments)      Social History     Tobacco Use    Smoking status: Never    Smokeless tobacco: Never   Substance Use Topics    Alcohol use: No      Family History   Problem Relation Age of Onset    Heart Disease Father         Subjective:      Alexa Valentin is a 45 y.o. WF with history of epilepsy, migraine headaches, obesity, arthritis and mental developmental delay who is here for follow-up for seizures and is here for follow up. Patient was previously being seen by Dr. Wolf Gasca (neurology). She was last seen 1/24/2019. Note mentions patient having a history of congenital static encephalopathy. Patient was taking Dilantin 200 mg in the morning and 330 mg at bedtime. Patient is also on Trokendi 400 mg daily. Plan then was to continue her medications. Phenytoin level was 27. Per mom and patient, she has been having increased seizures since April 20, 2019. She has had 10 seizures. Last seizure was 1 1/2 week PTC. Right arm and leg jerking with inability to respond. Lasting 3 1/2 minutes. Usual seizures are right side shakes but she is able to respond lasting 1 minute. Labs CBC and CMP done 5/30/2019 were unremarkable except for slight decrease in calcium at 8. 6.and slight increase in ALT at 33. Phenytoin level was 23.7. Review of records reveal patient was admitted at [facility] last 4/7/2019 to 4/15/2019 for sepsis in the setting of acute colitis and UTI. Neurology was consulted then for elevated phenytoin levels. No changes in regimen was made. 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 No acute intracranial abnormality.     4/03/2020, labs revealed free phenytoin of 2.1 (slight elevated) and topiramate 2.7 which is low therapeutic. No changes made to her regimen. 9/16/2020, labs done revealed elevated phenytoin at 39.3 and free phenytoin at 3.6. Topiramate was 4.8. Patient and mother were advised to increase Trokendi XR by 50 mg daily and stop Dilantin 30 mg. Per mother patient had 1 breakthrough seizure 2 weeks after but none since then. 1/19/2021, labs revealed phenytoin level of 27.9, free phenytoin level of 2.4 and topiramate level of 4.6. Since the last visit on 12/9/2021, patient had 1 breakthrough seizure in August after she got out of rehab that lasted 30 to 40 seconds. None since then. She continues to take Trokendi  mg total per day and Dilantin 200 in the morning and 300 mg at bedtime. Denies any side effects. Patient apparently tripped and fell at home and broke her right hip and underwent surgery last July 2022 at [facility]. Due to the condition of her bones, she has been doing limited weightbearing. She is undergoing home therapy until December and then will start outpatient therapy. So for the most part patient has been sedentary and uses a right foot brace to ambulate. Outside reports reviewed: none    Review of Systems:    A comprehensive review of systems was performed:   Positive for incontinence, muscle aches, weakness, tremors, back pain, headaches, gait issues    Objective:     Visit Vitals  /76   Pulse 77   Resp 18   Ht 5' 5\" (1.651 m)   Wt 104.3 kg (230 lb)   SpO2 96%   BMI 38.27 kg/m²       PHYSICAL EXAM:    NEUROLOGICAL EXAM:    Appearance: The patient is obese, provides a coherent history and is in no acute distress. Mental Status: Oriented to time, place and person. Fluent, no aphasia or dysarthria. Mood and affect appropriate. Cranial Nerves:   II - XII were intact. Motor:  5/5 strength UE. Weakness both legs due to deconditioning. No pronator drift. Reflexes:   Deep tendon reflexes were symmetrical.    Sensory:   Normal to cold and vibration.    Gait: Currently manual wheelchair bound. Tremor:   No tremors seen today. Cerebellar:  Intact FTN/JUDY.       Assessment:   Complex partial seizures   Anxiety  Tremors  Right leg weakness  Therapeutic drug monitoring    Plan:   Neurological examination is unchanged. It is still nonfocal.  No new deficits. No indication currently to do any other neuroimaging. 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 No acute intracranial abnormality. Patient with known history of epilepsy. Continue Trokendi  mg total at bedtime. Continue Dilantin branded extended release 100 mg 2 in the morning and 3 in the evening. Nayzilam for abortive therapy for breakthrough seizures. Patient was advised regarding seizure precautions and lowering seizure threshold. Advised patient to sleep on time and 7-8 hours a night. Do not sleep deprived. Do not skip meals. Do not be dehydrated. Patient was also advised regarding safety. Do not do tub baths only showers. Do not work in heights unless harnessed or with supervision. Patient understood. Patient was also advised to continue calcium and vitamin D supplementation. Stable issues with anxiety. Continue Prozac 20 mg at bedtime. No tremors seen today. Benefit from Topiramate. Exam reveals leg weakness likely secondary to deconditioning. Continue home health OT and PT to maximize functioning, improve LE strength and stability. All questions and concerns were answered. This note was created using voice recognition software. Despite editing, there may be syntax errors.

## 2022-12-10 DIAGNOSIS — G40.209 PARTIAL SYMPTOMATIC EPILEPSY WITH COMPLEX PARTIAL SEIZURES, NOT INTRACTABLE, WITHOUT STATUS EPILEPTICUS (HCC): ICD-10-CM

## 2023-01-06 NOTE — PROGRESS NOTES
1/5/2017      Asiya Munoz  1970  [id number]    Seizures    HISTORY OF PRESENT ILLNESS  Asiya Munoz  is a 51y.o. year old female who presents today, accompanied by mother, for follow up on seizure disorder. No seizures. She has a slightly high PHT level but is tolerating this completely fine. She is having no issues with the dose. Trying to decrease previously did cause seizures that were very intense. Dilantin 200/330    Trokendii 400mg daily    She continues with pain on the right leg. She does have a disc at L3-4. She would like to restart PT. She does have a circumducted gait. Recap:  She has had two seizures since Feb appt. One seizure was when she went for her first therapy session and she was stressed. She has another one while at [employer]. She is still having sleepiness with the Trokendii 400mg daily. She loves to sleep and doesn't get much exercise. She is going to [facility] for one day a week. She has a lipoma on her abdomen and she has to have surgery in September. She also may have a hernia. She continues on dilantin 200/300. We were going to wean this but she continued with seizures so remained on this dose. Patient has a new complaint today. She is having right leg and hip pain. She has pain on the lateral thigh. She is doing therapy for this. She has never had evaluation of her lower back.       Patient Active Problem List    Diagnosis Date Noted    Chronic migraine 12/19/2014    Mental developmental delay 12/19/2014    Seizure ([address] 12/03/2013     Past Medical History   Diagnosis Date    Bladder incontinence      Past Surgical History   Procedure Laterality Date    Hx appendectomy      Hx other surgical  2002     remove a lypoma     Family History   Problem Relation Age of Onset    Heart Disease Father      Current Outpatient Prescriptions   Medication Sig Dispense Refill    FLUoxetine (PROZAC) 20 mg capsule TAKE 1 CAPSULE BY MOUTH DAILY FOR MOOD 90 Cap 3    LORazepam (ATIVAN) 1 mg tablet Take 1 tab 1 hour prior to procedure   Then take 1 tab at time of procedure 2 Tab 0    phenytoin ER (DILANTIN ER) 100 mg ER capsule Take 2 capsules by mouth in morning and take 2 capsules by mouth every day at bedtime 120 Cap 3    topiramate ER (TROKENDI XR) 200 mg capsule Take 2 Caps by mouth daily. 60 Cap 5    TROKENDI  mg capsule TAKE 2 CAPSULES BY MOUTH DAILY. 60 Cap 5    butalbital-acetaminophen-caffeine (FIORICET) -40 mg per tablet Take 1 Tab by mouth every four (4) hours as needed for Pain or Headache. Indications: MIGRAINE, TENSION-TYPE HEADACHE 50 Tab 1    DILANTIN 30 mg ER capsule Take 1 Cap by mouth daily. 90 Cap 3    mirabegron ER (MYRBETRIQ) 25 mg ER tablet Take 25 mg by mouth daily.  NITROFURANTOIN MONOHYD/M-CRYST (MACROBID PO) Take  by mouth.  OTHER        Allergies   Allergen Reactions    Adhesive Tape-Silicones Other (comments)     Social History     Social History    Marital status: SINGLE     Spouse name: N/A    Number of children: N/A    Years of education: N/A     Occupational History    Not on file. Social History Main Topics    Smoking status: Never Smoker    Smokeless tobacco: Not on file    Alcohol use No    Drug use: Not on file    Sexual activity: Not on file     Other Topics Concern    Not on file     Social History Narrative     MRI L spine: L3-4 stenosis    Review of Systems  A comprehensive review of systems was negative except for that written in the HPI.     Objective:     Recent Vitals  Visit Vitals    /78    Pulse 70    Ht 5' 5\" (1.651 m)    Wt 237 lb (107.5 kg)    SpO2 98%    BMI 39.44 kg/m2       Physical Exam:  General appearance: alert, cooperative, no distress, slowed mentation  Lungs: clear to auscultation bilaterally  Heart: regular rate and rhythm, S1, S2 normal, no murmur, click, rub or gallop  Extremities: extremities normal, atraumatic, no cyanosis or edema  Skin: Skin color, texture, turgor normal. No rashes or lesions  Neurologic: Alert and oriented X 3, normal strength and tone. Normal symmetric reflexes. Normal coordination and gait    Assessment:     Partial Complex seizures, doing well on current meds. PHT level slightly high but patient asymptomatic. She continues with right leg pain. Will restart therapy and consider a trial of gabapentin if this does not help. Plan:   No labs needed  MRI lumbar spine as above  Dexa scan wnl  Continue Dilantin 200/330. New prescription given  Continue Trokendi 400 mg daily   continue Fioricet for migraine headache  Encourage daily mental and physical exercise   Reorder physical therapy  Consider gabapentin 300 mg nightly if pain persists    Followup 6 months      Signed: Lynette Salamanca MD  1/5/2017  10:28 AM    This note will not be viewable in [address] poor balance

## 2023-02-13 RX ORDER — FLUOXETINE HYDROCHLORIDE 20 MG/1
CAPSULE ORAL
Qty: 90 CAPSULE | Refills: 1 | Status: SHIPPED | OUTPATIENT
Start: 2023-02-13

## 2023-03-12 DIAGNOSIS — G40.209 PARTIAL SYMPTOMATIC EPILEPSY WITH COMPLEX PARTIAL SEIZURES, NOT INTRACTABLE, WITHOUT STATUS EPILEPTICUS (HCC): ICD-10-CM

## 2023-03-13 RX ORDER — TOPIRAMATE 50 MG/1
CAPSULE, EXTENDED RELEASE ORAL
Qty: 30 CAPSULE | Refills: 11 | Status: SHIPPED | OUTPATIENT
Start: 2023-03-13

## 2023-04-17 ENCOUNTER — TELEPHONE (OUTPATIENT)
Dept: NEUROLOGY | Age: 53
End: 2023-04-17

## 2023-04-17 NOTE — TELEPHONE ENCOUNTER
Patients mother would like a call from the nurse regarding her daughters Seizure medication. Patients [employer] never came in the mail and she only has enough to last her until this Thursday.      Please contact

## 2023-04-17 NOTE — TELEPHONE ENCOUNTER
Spoke with patient's mother, HIPAA verified,and not sure what is going on with patient's Trokendi 200mg and know that it is approved through 6/2023. Mother was told by [address] that Medicaid keeps kicking it out for some reason and was told to call medicaid. Patient's mother also stated she will be calling Medicaid to find out way but will go to a local pharmacy after hanging up to check on price because patient has enough medication until Thursday. Informed mother to call back with information from the pharmacist. Mother stated she would call back tomorrow.

## 2023-04-18 NOTE — TELEPHONE ENCOUNTER
Spoke with patient's mother, HIPAA verified, and patient's Trokendi was shipped out 4/16/2023 so patient should get it in a couple of days.

## 2023-04-18 NOTE — TELEPHONE ENCOUNTER
Pt mother calling nurse back to make aware seizure medicaiton not yet recevied, however she was told by company it was shipped 4/16 and should be delivered by thusday following week. Pt will be out of medication Thursday night. Please call to discuss.

## 2023-05-15 ENCOUNTER — HOSPITAL ENCOUNTER (OUTPATIENT)
Facility: HOSPITAL | Age: 53
Setting detail: OUTPATIENT SURGERY
Discharge: HOME | End: 2023-05-15
Attending: INTERNAL MEDICINE | Admitting: INTERNAL MEDICINE
Payer: MEDICARE

## 2023-05-15 ENCOUNTER — ANESTHESIA EVENT (OUTPATIENT)
Facility: HOSPITAL | Age: 53
End: 2023-05-15
Payer: MEDICARE

## 2023-05-15 ENCOUNTER — ANESTHESIA (OUTPATIENT)
Facility: HOSPITAL | Age: 53
End: 2023-05-15
Payer: MEDICARE

## 2023-05-15 VITALS
RESPIRATION RATE: 20 BRPM | TEMPERATURE: 98 F | DIASTOLIC BLOOD PRESSURE: 86 MMHG | HEART RATE: 72 BPM | SYSTOLIC BLOOD PRESSURE: 124 MMHG | OXYGEN SATURATION: 98 % | WEIGHT: 249.9 LBS | HEIGHT: 65 IN | BODY MASS INDEX: 41.63 KG/M2

## 2023-05-15 PROCEDURE — 3700000001 HC ADD 15 MINUTES (ANESTHESIA): Performed by: INTERNAL MEDICINE

## 2023-05-15 PROCEDURE — 7100000011 HC PHASE II RECOVERY - ADDTL 15 MIN: Performed by: INTERNAL MEDICINE

## 2023-05-15 PROCEDURE — 2500000003 HC RX 250 WO HCPCS: Performed by: NURSE ANESTHETIST, CERTIFIED REGISTERED

## 2023-05-15 PROCEDURE — C1889 IMPLANT/INSERT DEVICE, NOC: HCPCS | Performed by: INTERNAL MEDICINE

## 2023-05-15 PROCEDURE — 3600007502: Performed by: INTERNAL MEDICINE

## 2023-05-15 PROCEDURE — 7100000010 HC PHASE II RECOVERY - FIRST 15 MIN: Performed by: INTERNAL MEDICINE

## 2023-05-15 PROCEDURE — 3700000000 HC ANESTHESIA ATTENDED CARE: Performed by: INTERNAL MEDICINE

## 2023-05-15 PROCEDURE — 6360000002 HC RX W HCPCS: Performed by: NURSE ANESTHETIST, CERTIFIED REGISTERED

## 2023-05-15 PROCEDURE — 2580000003 HC RX 258: Performed by: NURSE ANESTHETIST, CERTIFIED REGISTERED

## 2023-05-15 PROCEDURE — 2720000010 HC SURG SUPPLY STERILE: Performed by: INTERNAL MEDICINE

## 2023-05-15 PROCEDURE — 2709999900 HC NON-CHARGEABLE SUPPLY: Performed by: INTERNAL MEDICINE

## 2023-05-15 PROCEDURE — 3600007512: Performed by: INTERNAL MEDICINE

## 2023-05-15 PROCEDURE — 88305 TISSUE EXAM BY PATHOLOGIST: CPT

## 2023-05-15 PROCEDURE — 7100000000 HC PACU RECOVERY - FIRST 15 MIN: Performed by: INTERNAL MEDICINE

## 2023-05-15 DEVICE — WORKING LENGTH 235CM, WORKING CHANNEL 2.8MM
Type: IMPLANTABLE DEVICE | Site: RECTUM | Status: FUNCTIONAL
Brand: RESOLUTION 360 CLIP

## 2023-05-15 RX ORDER — SODIUM CHLORIDE 0.9 % (FLUSH) 0.9 %
5-40 SYRINGE (ML) INJECTION PRN
Status: DISCONTINUED | OUTPATIENT
Start: 2023-05-15 | End: 2023-05-15 | Stop reason: HOSPADM

## 2023-05-15 RX ORDER — LIDOCAINE HYDROCHLORIDE 20 MG/ML
INJECTION, SOLUTION EPIDURAL; INFILTRATION; INTRACAUDAL; PERINEURAL PRN
Status: DISCONTINUED | OUTPATIENT
Start: 2023-05-15 | End: 2023-05-15 | Stop reason: SDUPTHER

## 2023-05-15 RX ORDER — 0.9 % SODIUM CHLORIDE 0.9 %
INTRAVENOUS SOLUTION INTRAVENOUS PRN
Status: DISCONTINUED | OUTPATIENT
Start: 2023-05-15 | End: 2023-05-15 | Stop reason: SDUPTHER

## 2023-05-15 RX ORDER — SODIUM CHLORIDE 9 MG/ML
25 INJECTION, SOLUTION INTRAVENOUS PRN
Status: DISCONTINUED | OUTPATIENT
Start: 2023-05-15 | End: 2023-05-15 | Stop reason: HOSPADM

## 2023-05-15 RX ORDER — SODIUM CHLORIDE 0.9 % (FLUSH) 0.9 %
5-40 SYRINGE (ML) INJECTION EVERY 12 HOURS SCHEDULED
Status: DISCONTINUED | OUTPATIENT
Start: 2023-05-15 | End: 2023-05-15 | Stop reason: HOSPADM

## 2023-05-15 RX ADMIN — PROPOFOL 50 MG: 10 INJECTION, EMULSION INTRAVENOUS at 10:44

## 2023-05-15 RX ADMIN — PROPOFOL 50 MG: 10 INJECTION, EMULSION INTRAVENOUS at 10:34

## 2023-05-15 RX ADMIN — PROPOFOL 50 MG: 10 INJECTION, EMULSION INTRAVENOUS at 10:36

## 2023-05-15 RX ADMIN — PROPOFOL 50 MG: 10 INJECTION, EMULSION INTRAVENOUS at 10:40

## 2023-05-15 RX ADMIN — SODIUM CHLORIDE 300 ML: 900 INJECTION, SOLUTION INTRAVENOUS at 10:48

## 2023-05-15 RX ADMIN — LIDOCAINE HYDROCHLORIDE 40 MG: 20 INJECTION, SOLUTION EPIDURAL; INFILTRATION; INTRACAUDAL; PERINEURAL at 10:34

## 2023-05-15 ASSESSMENT — PAIN DESCRIPTION - DESCRIPTORS: DESCRIPTORS: DISCOMFORT

## 2023-05-15 ASSESSMENT — PAIN SCALES - GENERAL
PAINLEVEL_OUTOF10: 0
PAINLEVEL_OUTOF10: 0

## 2023-05-15 ASSESSMENT — PAIN - FUNCTIONAL ASSESSMENT: PAIN_FUNCTIONAL_ASSESSMENT: 0-10

## 2023-05-15 NOTE — DISCHARGE INSTRUCTIONS
[place] Office: [phone]    Licha Veliz  [id number]  1970    EGD/COLONOSCOPY DISCHARGE INSTRUCTIONS  Discomfort:  Sore throat- throat lozenges or warm salt water gargle  redness at IV site- apply warm compress to area; if redness or soreness persist- contact your physician  Gaseous discomfort- walking, belching will help relieve any discomfort  You may not operate a vehicle for 12 hours  You may not engage in an occupation involving machinery or appliances for rest of today. You may not drink alcoholic beverages for at least 12 hours  Avoid making any critical decisions for at least 24 hour  DIET  You may resume your regular diet - however -  remember your colon is empty and a heavy meal will produce gas. Avoid these foods:  fried / greasy foods, excessive carbonated drinks or too much caffeine  MEDICATIONS   Regarding Aspirin or Nonsteroidal medications specifically, please see below. ACTIVITY  You may resume your normal daily activities. Spend the remainder of the day resting -  avoid any strenuous activity. CALL M.D. ANY SIGN OF   Increasing pain, nausea, vomiting  Abdominal distension (swelling)  New increased bleeding (oral or rectal)  Fever (chills)  Pain in chest area  Bloody discharge from nose or mouth  Shortness of breath    You may not take any Advil, Aspirin, Ibuprofen, Motrin, Aleve, or Goodys for 7 days, ONLY  Tylenol as needed for pain. Follow-up Instructions:   Call  Magan Corrales MD for any questions or concerns  Results of procedure / biopsy in 7 days   Telephone # [phone]      [unfilled]    Patient Education on Sedation / Analgesia Administered for Procedure      For 24 hours after general anesthesia or intravenous analgesia / sedation:  Have someone responsible help you with your care  Limit your activities  Do not drive and operate hazardous machinery  Do not make important personal, legal or business decisions  Do not drink alcoholic beverages  If

## 2023-05-15 NOTE — ANESTHESIA PRE PROCEDURE
Department of Anesthesiology  Preprocedure Note       Name:  Matthew Martinez   Age:  47 y.o.  :  1970                                          MRN:  [id number]         Date:  5/15/2023      Surgeon: Sonia Cordero):  Cleveland Shi MD    Procedure: Procedure(s):  COLONOSCOPY    Medications prior to admission:   Prior to Admission medications    Medication Sig Start Date End Date Taking? Authorizing Provider   albuterol sulfate HFA (PROVENTIL;VENTOLIN;PROAIR) 108 (90 Base) MCG/ACT inhaler Inhale 2 puffs into the lungs every 4 hours as needed 22   Ar Automatic Reconciliation   ascorbic acid (VITAMIN C) 500 MG tablet Take 1 tablet by mouth daily    Ar Automatic Reconciliation   butalbital-acetaminophen-caffeine (FIORICET, ESGIC) -40 MG per tablet Take 1 tablet by mouth every 6 hours as needed  Patient not taking: Reported on 2023 3/8/22   Ar Automatic Reconciliation   FLUoxetine (PROZAC) 20 MG capsule Take 1 capsule by mouth nightly  Patient not taking: Reported on 2023   Ar Automatic Reconciliation   fluticasone (FLONASE) 50 MCG/ACT nasal spray 2 sprays by Nasal route daily as needed 22   Ar Automatic Reconciliation   medroxyPROGESTERone (DEPO-PROVERA) 150 MG/ML injection INJECT 150MG INTO THE MUSCLE Q 12 WEEKS 19   Ar Automatic Reconciliation   methenamine (HIPREX) 1 g tablet Take 1 tablet by mouth 2 times daily (with meals)    Ar Automatic Reconciliation   mirabegron (MYRBETRIQ) 25 MG TB24 Take 1 tablet by mouth daily    Ar Automatic Reconciliation   phenytoin (DILANTIN) 100 MG ER capsule 1 capsule 22   Ar Automatic Reconciliation   topiramate ER (TROKENDI XR) 200 MG CP24 Take 2 capsules by mouth nightly 22   Ar Automatic Reconciliation   topiramate ER (TROKENDI XR) 50 MG CP24 Take 1 capsule by mouth daily. In addition to 400mg daily for a total dose of 450mg daily for convulsive seizures.  3/13/23   Ar Automatic Reconciliation       Current medications:

## 2023-05-15 NOTE — PERIOP NOTE
Fracisco Hernandez  1970  [id number]    Situation:  Verbal report received from: Amarilis GUSMAN  Procedure: Procedure(s):  COLONOSCOPY  COLONOSCOPY POLYPECTOMY SNARE/COLD BIOPSY    Background:    Preoperative diagnosis: Personal history of colonic polyps [Z86.010]  Family history of colonic polyps [Z83.71]  Postoperative diagnosis: * No post-op diagnosis entered *    :  Dr. Funmilayo Starr  Assistant(s): Circulator: Bj Stokes RN  Endoscopy Technician: Francy Rinne    Specimens:   ID Type Source Tests Collected by Time Destination   1 : Polyp: Sigmoid colon Tissue Sigmoid Colon SURGICAL PATHOLOGY [facility] Vanessa Bryant MD 5/15/2023 1045    2 : Polyp: Rectum Tissue Rectum SURGICAL PATHOLOGY [facility] Vanessa Bryant MD 5/15/2023 1050      H. Pylori  no    Assessment:      Anesthesia gave intra-procedure sedation and medications, see anesthesia flow sheet yes    Intravenous fluids: NS@ KVO     Vital signs stable     Abdominal assessment: round and soft     Recommendation:  Discharge patient per MD order.     Family or Friend   Permission to share finding with family or friend yes

## 2023-05-15 NOTE — ANESTHESIA POSTPROCEDURE EVALUATION
Department of Anesthesiology  Postprocedure Note    Patient: Shabana Dixon  MRN: [id number]  YOB: 1970  Date of evaluation: 5/15/2023      Procedure Summary     Date: 05/15/23 Room / Location: [facility] ENDO 02 / [facility] ENDOSCOPY    Anesthesia Start: 1034 Anesthesia Stop: 1053    Procedures:       COLONOSCOPY      COLONOSCOPY POLYPECTOMY SNARE/COLD BIOPSY (Lower GI Region) Diagnosis:       Personal history of colonic polyps      Family history of colonic polyps      (Personal history of colonic polyps [Z86.010])      (Family history of colonic polyps [Z83.71])    Surgeons: Asha Archibald MD Responsible Provider: Michelle Ma MD    Anesthesia Type: TIVA ASA Status: 3          Anesthesia Type: No value filed.     Markel Phase I: Markel Score: 10    Markel Phase II:        Anesthesia Post Evaluation    Patient location during evaluation: PACU  Patient participation: complete - patient participated  Level of consciousness: awake  Airway patency: patent  Nausea & Vomiting: no vomiting  Complications: no  Cardiovascular status: hemodynamically stable  Respiratory status: acceptable  Hydration status: euvolemic

## 2023-05-15 NOTE — OP NOTE
Colonoscopy Procedure Note    Tavo Pena  1970  [id number]    Indications:  Please see below. Pre-operative Diagnosis: Personal history of colonic polyps [Z86.010]  Family history of colonic polyps [Z83.71]    Post-operative Diagnosis: Colon polyps, internal hemorrhoids    : Magan Moffett MD    Referring Provider: LIVIER Burton NP    Sedation:  MAC anesthesia Propofol        Procedure Details:    After detailed informed consent was obtained with all risks and benefits of procedure explained and preoperative exam completed, the patient was taken to the endoscopy suite and placed in the left lateral decubitus position. Upon sequential sedation as per above, a digital rectal exam was performed  and was normal.  The Olympus videocolonoscope  was inserted in the rectum and carefully advanced to the cecum, which was identified by the ileocecal valve. The quality of preparation was inadequate  PATIENTS [facility] Bowel Preparation Score:1/3/2:6 . The colonoscope was slowly withdrawn with careful evaluation between folds. Retroflexion in the rectum was performed. Findings:   The Olympus video high-definition colonoscope was advanced to the cecum, identified by the ICV. Right colon and rectum has grainy stool which cannot be cleared completely. An 8 mm sessile ascending colon polyp was removed but lost in a puddle of stools. A 5 mm sessile sigmoid and a 9 mm rectal polyp noted. Both were removed with  cold snare. Medium sized interna hemorrhoids   The mucosa of the colon is normal appearing to the cecum with no obvious mucosal pathology (polyp,mass lesion, colitis etc) noted on this colonoscopy. Therapies:  3 complete polypectomies were performed using cold specimen retrieved and the polyps were  retrieved. Ascending colon polyp was not retrieved. Specimen/s:   Specimens were collected as described above and sent to pathology.      Complications: None were encountered during the

## 2023-05-15 NOTE — H&P
Pre-endoscopy H and P    The patient was seen and examined in the room/pre-op holding area. The airway was assessed and documented. The problem list, past medical history, and medications were reviewed. Patient Active Problem List   Diagnosis    Sepsis ([facility] [address]    Colitis    Diarrhea    Blood in stool    Seizure (HCC)    Elevated LDL cholesterol level    NSAID long-term use    Mental developmental delay     Social History     Socioeconomic History    Marital status: Single     Spouse name: Not on file    Number of children: Not on file    Years of education: Not on file    Highest education level: Not on file   Occupational History    Not on file   Tobacco Use    Smoking status: Never    Smokeless tobacco: Never   Vaping Use    Vaping Use: Never used   Substance and Sexual Activity    Alcohol use: No    Drug use: Never    Sexual activity: Not on file   Other Topics Concern    Not on file   Social History Narrative    Not on file     Social Determinants of Health     Financial Resource Strain: Low Risk     Difficulty of Paying Living Expenses: Not hard at all   Food Insecurity: No Food Insecurity    Worried About Running Out of Food in the Last Year: Never true    Ran Out of Food in the Last Year: Never true   Transportation Needs: Not on file   Physical Activity: Not on file   Stress: Not on file   Social Connections: Not on file   Intimate Partner Violence: Not on file   Housing Stability: Not on file     Past Medical History:   Diagnosis Date    Arthritis     Bladder incontinence     Chronic ear infection     Ill-defined condition     chronic hydrocephalus; no shunt, well managed    Seizure disorder ([facility]          Prior to Admission Medications   Prescriptions Last Dose Informant Patient Reported? Taking?    FLUoxetine (PROZAC) 20 MG capsule 5/14/2023  Yes No   Sig: Take 1 capsule by mouth nightly   Patient not taking: Reported on 5/12/2023   albuterol sulfate HFA (PROVENTIL;VENTOLIN;PROAIR) 108 (90 Base)

## 2023-05-16 ENCOUNTER — TELEPHONE (OUTPATIENT)
Age: 53
End: 2023-05-16

## 2023-05-16 NOTE — TELEPHONE ENCOUNTER
Requesting a call to discuss the reason why her PA for medication Trokendi  this month () instead of ()

## 2023-05-19 NOTE — TELEPHONE ENCOUNTER
RE:Trokendi XR 50MG er capsules  [place]: Mukesh POLO request added and submitted via CMM awaiting update

## 2023-05-19 NOTE — TELEPHONE ENCOUNTER
Spoke with patient's mother, HIPAA verified, and informed that Trokendi XR 50mg has been approved until 5/18/2024, per PA specialist.  Mother verbalized understanding.

## 2023-05-19 NOTE — TELEPHONE ENCOUNTER
RE:Trokendi XR 50MG er capsules  Key: [id number]       Rcvd notification via CMM that medication has been approved.     This approval authorizes your coverage from 01/01/2023 - 05/18/2024, unless we notify you  otherwise, and as long as the following conditions apply      Nurse notified

## 2023-07-14 LAB — MAMMOGRAPHY, EXTERNAL: NORMAL

## 2023-08-09 RX ORDER — PHENYTOIN SODIUM 100 MG/1
CAPSULE, EXTENDED RELEASE ORAL
Qty: 450 CAPSULE | Refills: 3 | Status: SHIPPED | OUTPATIENT
Start: 2023-08-09

## 2023-08-09 RX ORDER — FLUOXETINE HYDROCHLORIDE 20 MG/1
CAPSULE ORAL NIGHTLY
Qty: 90 CAPSULE | Refills: 1 | Status: SHIPPED | OUTPATIENT
Start: 2023-08-09

## 2023-10-08 RX ORDER — FLUTICASONE PROPIONATE 50 MCG
SPRAY, SUSPENSION (ML) NASAL
Qty: 16 G | Refills: 4 | Status: SHIPPED | OUTPATIENT
Start: 2023-10-08

## 2023-10-09 ENCOUNTER — OFFICE VISIT (OUTPATIENT)
Age: 53
End: 2023-10-09
Payer: MEDICARE

## 2023-10-09 VITALS
DIASTOLIC BLOOD PRESSURE: 88 MMHG | BODY MASS INDEX: 39.61 KG/M2 | TEMPERATURE: 98 F | HEART RATE: 68 BPM | OXYGEN SATURATION: 98 % | SYSTOLIC BLOOD PRESSURE: 116 MMHG | WEIGHT: 238 LBS

## 2023-10-09 DIAGNOSIS — G40.209 LOCALIZATION-RELATED (FOCAL) (PARTIAL) SYMPTOMATIC EPILEPSY AND EPILEPTIC SYNDROMES WITH COMPLEX PARTIAL SEIZURES, NOT INTRACTABLE, WITHOUT STATUS EPILEPTICUS (HCC): Primary | ICD-10-CM

## 2023-10-09 DIAGNOSIS — R29.898 RIGHT LEG WEAKNESS: ICD-10-CM

## 2023-10-09 DIAGNOSIS — Z51.81 THERAPEUTIC DRUG MONITORING: ICD-10-CM

## 2023-10-09 DIAGNOSIS — G40.209 LOCALIZATION-RELATED (FOCAL) (PARTIAL) SYMPTOMATIC EPILEPSY AND EPILEPTIC SYNDROMES WITH COMPLEX PARTIAL SEIZURES, NOT INTRACTABLE, WITHOUT STATUS EPILEPTICUS (HCC): ICD-10-CM

## 2023-10-09 DIAGNOSIS — R25.1 TREMOR: ICD-10-CM

## 2023-10-09 DIAGNOSIS — F41.9 ANXIETY: ICD-10-CM

## 2023-10-09 LAB
ALBUMIN SERPL-MCNC: 4 G/DL (ref 3.5–5)
ALBUMIN/GLOB SERPL: 1.2 (ref 1.1–2.2)
ALP SERPL-CCNC: 83 U/L (ref 45–117)
ALT SERPL-CCNC: 31 U/L (ref 12–78)
ANION GAP SERPL CALC-SCNC: 6 MMOL/L (ref 5–15)
AST SERPL-CCNC: 27 U/L (ref 15–37)
BASOPHILS # BLD: 0 K/UL (ref 0–0.1)
BASOPHILS NFR BLD: 1 % (ref 0–1)
BILIRUB SERPL-MCNC: 0.5 MG/DL (ref 0.2–1)
BUN SERPL-MCNC: 11 MG/DL (ref 6–20)
BUN/CREAT SERPL: 16 (ref 12–20)
CALCIUM SERPL-MCNC: 9.3 MG/DL (ref 8.5–10.1)
CHLORIDE SERPL-SCNC: 107 MMOL/L (ref 97–108)
CO2 SERPL-SCNC: 25 MMOL/L (ref 21–32)
CREAT SERPL-MCNC: 0.69 MG/DL (ref 0.55–1.02)
DIFFERENTIAL METHOD BLD: ABNORMAL
EOSINOPHIL # BLD: 0.2 K/UL (ref 0–0.4)
EOSINOPHIL NFR BLD: 3 % (ref 0–7)
ERYTHROCYTE [DISTWIDTH] IN BLOOD BY AUTOMATED COUNT: 12 % (ref 11.5–14.5)
GLOBULIN SER CALC-MCNC: 3.4 G/DL (ref 2–4)
GLUCOSE SERPL-MCNC: 85 MG/DL (ref 65–100)
HCT VFR BLD AUTO: 46.4 % (ref 35–47)
HGB BLD-MCNC: 15.8 G/DL (ref 11.5–16)
IMM GRANULOCYTES # BLD AUTO: 0 K/UL (ref 0–0.04)
IMM GRANULOCYTES NFR BLD AUTO: 0 % (ref 0–0.5)
LYMPHOCYTES # BLD: 2.2 K/UL (ref 0.8–3.5)
LYMPHOCYTES NFR BLD: 37 % (ref 12–49)
MCH RBC QN AUTO: 35 PG (ref 26–34)
MCHC RBC AUTO-ENTMCNC: 34.1 G/DL (ref 30–36.5)
MCV RBC AUTO: 102.7 FL (ref 80–99)
MONOCYTES # BLD: 0.7 K/UL (ref 0–1)
MONOCYTES NFR BLD: 11 % (ref 5–13)
NEUTS SEG # BLD: 2.9 K/UL (ref 1.8–8)
NEUTS SEG NFR BLD: 48 % (ref 32–75)
NRBC # BLD: 0 K/UL (ref 0–0.01)
NRBC BLD-RTO: 0 PER 100 WBC
PLATELET # BLD AUTO: 168 K/UL (ref 150–400)
PMV BLD AUTO: 10.8 FL (ref 8.9–12.9)
POTASSIUM SERPL-SCNC: 3.5 MMOL/L (ref 3.5–5.1)
PROT SERPL-MCNC: 7.4 G/DL (ref 6.4–8.2)
RBC # BLD AUTO: 4.52 M/UL (ref 3.8–5.2)
SODIUM SERPL-SCNC: 138 MMOL/L (ref 136–145)
WBC # BLD AUTO: 6 K/UL (ref 3.6–11)

## 2023-10-09 PROCEDURE — G8417 CALC BMI ABV UP PARAM F/U: HCPCS | Performed by: PSYCHIATRY & NEUROLOGY

## 2023-10-09 PROCEDURE — G8484 FLU IMMUNIZE NO ADMIN: HCPCS | Performed by: PSYCHIATRY & NEUROLOGY

## 2023-10-09 PROCEDURE — 1036F TOBACCO NON-USER: CPT | Performed by: PSYCHIATRY & NEUROLOGY

## 2023-10-09 PROCEDURE — G8427 DOCREV CUR MEDS BY ELIG CLIN: HCPCS | Performed by: PSYCHIATRY & NEUROLOGY

## 2023-10-09 PROCEDURE — 99214 OFFICE O/P EST MOD 30 MIN: CPT | Performed by: PSYCHIATRY & NEUROLOGY

## 2023-10-09 PROCEDURE — 3017F COLORECTAL CA SCREEN DOC REV: CPT | Performed by: PSYCHIATRY & NEUROLOGY

## 2023-10-09 RX ORDER — MIRABEGRON 50 MG/1
TABLET, FILM COATED, EXTENDED RELEASE ORAL
COMMUNITY
Start: 2023-10-06

## 2023-10-13 ENCOUNTER — TELEPHONE (OUTPATIENT)
Age: 53
End: 2023-10-13

## 2023-10-13 DIAGNOSIS — G40.209 LOCALIZATION-RELATED (FOCAL) (PARTIAL) SYMPTOMATIC EPILEPSY AND EPILEPTIC SYNDROMES WITH COMPLEX PARTIAL SEIZURES, NOT INTRACTABLE, WITHOUT STATUS EPILEPTICUS (HCC): Primary | ICD-10-CM

## 2023-10-13 DIAGNOSIS — Z51.81 THERAPEUTIC DRUG MONITORING: ICD-10-CM

## 2023-10-13 NOTE — TELEPHONE ENCOUNTER
Spoke with Jimy Macias at [facility] lab with critical lab result. Phenytoin, Total is 53.0. Dr. Rafael Alves made aware.

## 2023-10-16 LAB
PHENYTOIN FREE SERPL-MCNC: 2.9 UG/ML (ref 1–2)
PHENYTOIN SERPL-MCNC: 53 UG/ML (ref 10–20)
TOPIRAMATE SERPL-MCNC: 4.3 UG/ML (ref 2–25)

## 2023-10-27 DIAGNOSIS — Z51.81 THERAPEUTIC DRUG MONITORING: ICD-10-CM

## 2023-10-27 DIAGNOSIS — G40.209 LOCALIZATION-RELATED (FOCAL) (PARTIAL) SYMPTOMATIC EPILEPSY AND EPILEPTIC SYNDROMES WITH COMPLEX PARTIAL SEIZURES, NOT INTRACTABLE, WITHOUT STATUS EPILEPTICUS (HCC): ICD-10-CM

## 2023-10-31 ENCOUNTER — OFFICE VISIT (OUTPATIENT)
Age: 53
End: 2023-10-31
Payer: MEDICARE

## 2023-10-31 VITALS
BODY MASS INDEX: 40.12 KG/M2 | HEIGHT: 65 IN | HEART RATE: 65 BPM | WEIGHT: 240.8 LBS | OXYGEN SATURATION: 97 % | TEMPERATURE: 98 F | SYSTOLIC BLOOD PRESSURE: 123 MMHG | DIASTOLIC BLOOD PRESSURE: 53 MMHG | RESPIRATION RATE: 16 BRPM

## 2023-10-31 DIAGNOSIS — R73.03 PREDIABETES: ICD-10-CM

## 2023-10-31 DIAGNOSIS — F33.41 RECURRENT MAJOR DEPRESSIVE DISORDER, IN PARTIAL REMISSION (HCC): ICD-10-CM

## 2023-10-31 DIAGNOSIS — Z13.220 SCREENING, LIPID: ICD-10-CM

## 2023-10-31 DIAGNOSIS — Z23 NEEDS FLU SHOT: ICD-10-CM

## 2023-10-31 DIAGNOSIS — R56.9 SEIZURE (HCC): ICD-10-CM

## 2023-10-31 DIAGNOSIS — E55.9 VITAMIN D DEFICIENCY: ICD-10-CM

## 2023-10-31 DIAGNOSIS — E66.01 OBESITY, CLASS III, BMI 40-49.9 (MORBID OBESITY) (HCC): ICD-10-CM

## 2023-10-31 DIAGNOSIS — Z00.00 MEDICARE ANNUAL WELLNESS VISIT, SUBSEQUENT: Primary | ICD-10-CM

## 2023-10-31 DIAGNOSIS — Z13.29 SCREENING FOR THYROID DISORDER: ICD-10-CM

## 2023-10-31 PROBLEM — F33.1 MAJOR DEPRESSIVE DISORDER, RECURRENT, MODERATE (HCC): Status: ACTIVE | Noted: 2023-10-31

## 2023-10-31 PROBLEM — F33.0 MAJOR DEPRESSIVE DISORDER, RECURRENT, MILD (HCC): Status: ACTIVE | Noted: 2023-10-31

## 2023-10-31 PROBLEM — F33.9 MAJOR DEPRESSIVE DISORDER, RECURRENT, UNSPECIFIED (HCC): Status: ACTIVE | Noted: 2023-10-31

## 2023-10-31 LAB
PHENYTOIN FREE SERPL-MCNC: 2.8 UG/ML (ref 1–2)
PHENYTOIN SERPL-MCNC: 38.2 UG/ML (ref 10–20)

## 2023-10-31 PROCEDURE — 99213 OFFICE O/P EST LOW 20 MIN: CPT | Performed by: NURSE PRACTITIONER

## 2023-10-31 PROCEDURE — G8427 DOCREV CUR MEDS BY ELIG CLIN: HCPCS | Performed by: NURSE PRACTITIONER

## 2023-10-31 PROCEDURE — 3017F COLORECTAL CA SCREEN DOC REV: CPT | Performed by: NURSE PRACTITIONER

## 2023-10-31 PROCEDURE — PBSHW INFLUENZA, FLUCELVAX, (AGE 6 MO+), IM, PF, 0.5 ML: Performed by: NURSE PRACTITIONER

## 2023-10-31 PROCEDURE — 1036F TOBACCO NON-USER: CPT | Performed by: NURSE PRACTITIONER

## 2023-10-31 PROCEDURE — 90674 CCIIV4 VAC NO PRSV 0.5 ML IM: CPT | Performed by: NURSE PRACTITIONER

## 2023-10-31 PROCEDURE — G8482 FLU IMMUNIZE ORDER/ADMIN: HCPCS | Performed by: NURSE PRACTITIONER

## 2023-10-31 PROCEDURE — G0439 PPPS, SUBSEQ VISIT: HCPCS | Performed by: NURSE PRACTITIONER

## 2023-10-31 PROCEDURE — G8417 CALC BMI ABV UP PARAM F/U: HCPCS | Performed by: NURSE PRACTITIONER

## 2023-10-31 SDOH — ECONOMIC STABILITY: FOOD INSECURITY: WITHIN THE PAST 12 MONTHS, YOU WORRIED THAT YOUR FOOD WOULD RUN OUT BEFORE YOU GOT MONEY TO BUY MORE.: NEVER TRUE

## 2023-10-31 SDOH — ECONOMIC STABILITY: HOUSING INSECURITY
IN THE LAST 12 MONTHS, WAS THERE A TIME WHEN YOU DID NOT HAVE A STEADY PLACE TO SLEEP OR SLEPT IN A SHELTER (INCLUDING NOW)?: NO

## 2023-10-31 SDOH — ECONOMIC STABILITY: INCOME INSECURITY: HOW HARD IS IT FOR YOU TO PAY FOR THE VERY BASICS LIKE FOOD, HOUSING, MEDICAL CARE, AND HEATING?: NOT HARD AT ALL

## 2023-10-31 SDOH — ECONOMIC STABILITY: FOOD INSECURITY: WITHIN THE PAST 12 MONTHS, THE FOOD YOU BOUGHT JUST DIDN'T LAST AND YOU DIDN'T HAVE MONEY TO GET MORE.: NEVER TRUE

## 2023-10-31 ASSESSMENT — PATIENT HEALTH QUESTIONNAIRE - PHQ9
1. LITTLE INTEREST OR PLEASURE IN DOING THINGS: 0
2. FEELING DOWN, DEPRESSED OR HOPELESS: 0
SUM OF ALL RESPONSES TO PHQ QUESTIONS 1-9: 0
SUM OF ALL RESPONSES TO PHQ9 QUESTIONS 1 & 2: 0

## 2023-10-31 ASSESSMENT — LIFESTYLE VARIABLES
HOW MANY STANDARD DRINKS CONTAINING ALCOHOL DO YOU HAVE ON A TYPICAL DAY: PATIENT DOES NOT DRINK
HOW OFTEN DO YOU HAVE A DRINK CONTAINING ALCOHOL: NEVER

## 2023-10-31 NOTE — PROGRESS NOTES
Medicare Annual Wellness Visit    Diane Bradshaw is here for Medicare AWV    Assessment & Plan   Medicare annual wellness visit, subsequent  Vitamin D deficiency  -     Vitamin D 25 Hydroxy; Future  Screening for thyroid disorder  -     TSH; Future  Prediabetes-continue to work on diet and exercise. Decrease carb intake  -     Hemoglobin A1C; Future  Screening, lipid  -     Lipid Panel; Future  Obesity, Class III, BMI 40-49.9 (morbid obesity) (HCC)-Discussed BMI and healthy weight. Encouraged patient to work to implement changes including diet high in raw fruits and vegetables, lean protein and good fats. Limit refined, processed carbohydrates and sugar. Encouraged regular exercise. Recurrent major depressive disorder, in partial remission (HCC)-stable on prozac    Needs flu shot  -     Influenza, FLUCELVAX, (age 10 mo+), IM, Preservative Free, 0.5 mL    Seizure (HCC)-continue to follow with neurology, no recent seizure. Recommendations for Preventive Services Due: see orders and patient instructions/AVS.  Recommended screening schedule for the next 5-10 years is provided to the patient in written form: see Patient Instructions/AVS.     Return in about 1 year (around 10/31/2024), or if symptoms worsen or fail to improve. Subjective   The following acute and/or chronic problems were also addressed today:      Followed by neurology, Dr Tiara Kaur, last seen 10/9/23  Had labs done then  Phenytoin levels were high so he adjusted. Had redraw levels on 10/27/23, levels are improving. No seizures    Still followed by urology as well, usually every 6 monhs    GYN: Jamie Xiao 2/2023, no menses since  UTD mammogram     Patient's complete Health Risk Assessment and screening values have been reviewed and are found in Flowsheets. The following problems were reviewed today and where indicated follow up appointments were made and/or referrals ordered.     Positive Risk Factor Screenings with

## 2023-10-31 NOTE — PATIENT INSTRUCTIONS
Advance Directives: Care Instructions  Overview  An advance directive is a legal way to state your wishes at the end of your life. It tells your family and your doctor what to do if you can't say what you want. There are two main types of advance directives. You can change them any time your wishes change. Living will. This form tells your family and your doctor your wishes about life support and other treatment. The form is also called a declaration. Medical power of . This form lets you name a person to make treatment decisions for you when you can't speak for yourself. This person is called a health care agent (health care proxy, health care surrogate). The form is also called a durable power of  for health care. If you do not have an advance directive, decisions about your medical care may be made by a family member, or by a doctor or a  who doesn't know you. It may help to think of an advance directive as a gift to the people who care for you. If you have one, they won't have to make tough decisions by themselves. For more information, including forms for your state, see the [address] [place] [address] website (www.caringinfo.org/planning/advance-directives/). Follow-up care is a key part of your treatment and safety. Be sure to make and go to all appointments, and call your doctor if you are having problems. It's also a good idea to know your test results and keep a list of the medicines you take. What should you include in an advance directive? Many states have a unique advance directive form. (It may ask you to address specific issues.) Or you might use a universal form that's approved by many states. If your form doesn't tell you what to address, it may be hard to know what to include in your advance directive. Use the questions below to help you get started. Who do you want to make decisions about your medical care if you are not able to?   What life-support measures do you want if you

## 2023-11-01 LAB
25(OH)D3 SERPL-MCNC: 22.2 NG/ML (ref 30–100)
CHOLEST SERPL-MCNC: 235 MG/DL
EST. AVERAGE GLUCOSE BLD GHB EST-MCNC: 94 MG/DL
HBA1C MFR BLD: 4.9 % (ref 4–5.6)
HDLC SERPL-MCNC: 58 MG/DL
HDLC SERPL: 4.1 (ref 0–5)
LDLC SERPL CALC-MCNC: 152.6 MG/DL (ref 0–100)
TRIGL SERPL-MCNC: 122 MG/DL
TSH SERPL DL<=0.05 MIU/L-ACNC: 0.79 UIU/ML (ref 0.36–3.74)
VLDLC SERPL CALC-MCNC: 24.4 MG/DL

## 2023-11-02 ENCOUNTER — TELEPHONE (OUTPATIENT)
Age: 53
End: 2023-11-02

## 2023-11-02 DIAGNOSIS — Z51.81 THERAPEUTIC DRUG MONITORING: ICD-10-CM

## 2023-11-02 DIAGNOSIS — G40.209 LOCALIZATION-RELATED (FOCAL) (PARTIAL) SYMPTOMATIC EPILEPSY AND EPILEPTIC SYNDROMES WITH COMPLEX PARTIAL SEIZURES, NOT INTRACTABLE, WITHOUT STATUS EPILEPTICUS (HCC): Primary | ICD-10-CM

## 2023-11-02 RX ORDER — ROSUVASTATIN CALCIUM 5 MG/1
5 TABLET, COATED ORAL NIGHTLY
Qty: 90 TABLET | Refills: 3 | Status: SHIPPED | OUTPATIENT
Start: 2023-11-02

## 2023-11-07 RX ORDER — FLUTICASONE PROPIONATE 50 MCG
SPRAY, SUSPENSION (ML) NASAL
Qty: 48 G | Refills: 3 | Status: SHIPPED | OUTPATIENT
Start: 2023-11-07

## 2023-11-07 NOTE — TELEPHONE ENCOUNTER
We received a fax refill request for [facility].  Please escribe fluticasone (FLONASE) 50 MCG/ACT nasal spray to their pharmacy. The pharmacy is correct in the chart and they are requesting a 90 day supply.

## 2023-11-10 DIAGNOSIS — R56.9 SEIZURE (HCC): Primary | ICD-10-CM

## 2023-11-10 RX ORDER — TOPIRAMATE 200 MG/1
2 CAPSULE, EXTENDED RELEASE ORAL NIGHTLY
Qty: 180 CAPSULE | Refills: 3 | Status: SHIPPED | OUTPATIENT
Start: 2023-11-10

## 2023-11-14 ENCOUNTER — TELEPHONE (OUTPATIENT)
Age: 53
End: 2023-11-14

## 2023-11-14 NOTE — TELEPHONE ENCOUNTER
Pt mom, Gerry Tavares is calling to get a referral to physical therapy please    Gerry Tavares stated [facility] is the best one

## 2023-11-15 NOTE — TELEPHONE ENCOUNTER
verified with pts mom. Mom states pt had broken her hip around a year ago and patient had inpatient physical therapy and then had outpatient physical therapy at [address] [place] [address] physical therapy. The physical therapist [facility] stated patient could continue with the physical therapy or could wait until the end of the year to the do the last few session as \"maintenance therapy. \" Mom would like an updated [facility] physical therapy referral for right hip.

## 2023-11-27 DIAGNOSIS — R56.9 SEIZURE (HCC): ICD-10-CM

## 2023-11-27 RX ORDER — TOPIRAMATE 200 MG/1
2 CAPSULE, EXTENDED RELEASE ORAL NIGHTLY
Qty: 180 CAPSULE | Refills: 3 | Status: SHIPPED | OUTPATIENT
Start: 2023-11-27 | End: 2023-11-28 | Stop reason: ALTCHOICE

## 2023-11-28 DIAGNOSIS — G40.209 LOCALIZATION-RELATED (FOCAL) (PARTIAL) SYMPTOMATIC EPILEPSY AND EPILEPTIC SYNDROMES WITH COMPLEX PARTIAL SEIZURES, NOT INTRACTABLE, WITHOUT STATUS EPILEPTICUS (HCC): ICD-10-CM

## 2023-11-28 DIAGNOSIS — G40.909 SEIZURE DISORDER (HCC): Primary | ICD-10-CM

## 2023-11-28 DIAGNOSIS — Z51.81 THERAPEUTIC DRUG MONITORING: ICD-10-CM

## 2023-11-28 RX ORDER — TOPIRAMATE 200 MG/1
400 CAPSULE, EXTENDED RELEASE ORAL
Qty: 180 CAPSULE | Refills: 3 | Status: SHIPPED | OUTPATIENT
Start: 2023-11-28

## 2023-11-29 ENCOUNTER — TELEPHONE (OUTPATIENT)
Age: 53
End: 2023-11-29

## 2023-11-29 NOTE — TELEPHONE ENCOUNTER
Spoke with Arun at [facility], that patient needs brand Tonydi. Arun verbalized and stated they did receive a new RX for brand.

## 2023-11-29 NOTE — TELEPHONE ENCOUNTER
TROKENDI  MG      [facility] calling to get clarification. Would like to know if name brand should be dispensed or generic.

## 2023-12-01 DIAGNOSIS — G40.209 LOCALIZATION-RELATED (FOCAL) (PARTIAL) SYMPTOMATIC EPILEPSY AND EPILEPTIC SYNDROMES WITH COMPLEX PARTIAL SEIZURES, NOT INTRACTABLE, WITHOUT STATUS EPILEPTICUS (HCC): Primary | ICD-10-CM

## 2023-12-01 DIAGNOSIS — Z51.81 THERAPEUTIC DRUG MONITORING: ICD-10-CM

## 2023-12-01 LAB
PHENYTOIN FREE SERPL-MCNC: 3.6 UG/ML (ref 1–2)
PHENYTOIN SERPL-MCNC: 43.6 UG/ML (ref 10–20)

## 2023-12-28 DIAGNOSIS — G40.209 LOCALIZATION-RELATED (FOCAL) (PARTIAL) SYMPTOMATIC EPILEPSY AND EPILEPTIC SYNDROMES WITH COMPLEX PARTIAL SEIZURES, NOT INTRACTABLE, WITHOUT STATUS EPILEPTICUS (HCC): ICD-10-CM

## 2023-12-28 DIAGNOSIS — Z51.81 THERAPEUTIC DRUG MONITORING: ICD-10-CM

## 2023-12-29 ENCOUNTER — TELEPHONE (OUTPATIENT)
Age: 53
End: 2023-12-29

## 2023-12-29 NOTE — TELEPHONE ENCOUNTER
Sent patient message through [employer] portal thatTrokendi  mg was sent 11/28/2023 with 180 caps with 3 refills and to check with pharmacy.

## 2023-12-29 NOTE — TELEPHONE ENCOUNTER
Is requesting refill on medication (Trokendi 200mg) for patient. States to please send Doctor kinetic [facility].

## 2024-01-02 LAB
PHENYTOIN FREE SERPL-MCNC: 1.8 UG/ML (ref 1–2)
PHENYTOIN SERPL-MCNC: 24.9 UG/ML (ref 10–20)

## 2024-02-05 DIAGNOSIS — G40.209 LOCALIZATION-RELATED (FOCAL) (PARTIAL) SYMPTOMATIC EPILEPSY AND EPILEPTIC SYNDROMES WITH COMPLEX PARTIAL SEIZURES, NOT INTRACTABLE, WITHOUT STATUS EPILEPTICUS (HCC): Primary | ICD-10-CM

## 2024-02-05 DIAGNOSIS — F41.9 ANXIETY: ICD-10-CM

## 2024-02-05 RX ORDER — TOPIRAMATE 50 MG/1
CAPSULE, EXTENDED RELEASE ORAL
Qty: 30 CAPSULE | Refills: 3 | Status: SHIPPED | OUTPATIENT
Start: 2024-02-05 | End: 2024-02-29 | Stop reason: SDUPTHER

## 2024-02-05 RX ORDER — FLUOXETINE HYDROCHLORIDE 20 MG/1
CAPSULE ORAL NIGHTLY
Qty: 90 CAPSULE | Refills: 1 | Status: SHIPPED | OUTPATIENT
Start: 2024-02-05

## 2024-02-12 RX ORDER — ROSUVASTATIN CALCIUM 5 MG/1
5 TABLET, COATED ORAL NIGHTLY
Qty: 90 TABLET | Refills: 3 | Status: SHIPPED | OUTPATIENT
Start: 2024-02-12

## 2024-02-29 DIAGNOSIS — G40.209 LOCALIZATION-RELATED (FOCAL) (PARTIAL) SYMPTOMATIC EPILEPSY AND EPILEPTIC SYNDROMES WITH COMPLEX PARTIAL SEIZURES, NOT INTRACTABLE, WITHOUT STATUS EPILEPTICUS (HCC): ICD-10-CM

## 2024-02-29 DIAGNOSIS — G40.909 SEIZURE DISORDER (HCC): ICD-10-CM

## 2024-02-29 RX ORDER — TOPIRAMATE 50 MG/1
CAPSULE, EXTENDED RELEASE ORAL
Qty: 30 CAPSULE | Refills: 3 | Status: SHIPPED | OUTPATIENT
Start: 2024-02-29

## 2024-02-29 RX ORDER — TOPIRAMATE 200 MG/1
400 CAPSULE, EXTENDED RELEASE ORAL
Qty: 180 CAPSULE | Refills: 3 | Status: SHIPPED | OUTPATIENT
Start: 2024-02-29

## 2024-02-29 NOTE — TELEPHONE ENCOUNTER
Patient and her mother called and stated the pharmacy [facility] informed them her medication refill for Trokendi has  and they need authorization from the doctor.     200 mg and  50 mg       Phi has  back in 2023 for her mother

## 2024-04-15 ENCOUNTER — OFFICE VISIT (OUTPATIENT)
Facility: CLINIC | Age: 54
End: 2024-04-15
Payer: MEDICARE

## 2024-04-15 VITALS
HEIGHT: 65 IN | DIASTOLIC BLOOD PRESSURE: 84 MMHG | BODY MASS INDEX: 39.82 KG/M2 | WEIGHT: 239 LBS | SYSTOLIC BLOOD PRESSURE: 129 MMHG | RESPIRATION RATE: 16 BRPM | OXYGEN SATURATION: 97 % | TEMPERATURE: 99.4 F | HEART RATE: 59 BPM

## 2024-04-15 DIAGNOSIS — E78.00 ELEVATED LDL CHOLESTEROL LEVEL: ICD-10-CM

## 2024-04-15 DIAGNOSIS — R56.9 SEIZURE (HCC): ICD-10-CM

## 2024-04-15 DIAGNOSIS — Z76.89 ENCOUNTER TO ESTABLISH CARE WITH NEW DOCTOR: Primary | ICD-10-CM

## 2024-04-15 DIAGNOSIS — F33.41 RECURRENT MAJOR DEPRESSIVE DISORDER, IN PARTIAL REMISSION (HCC): ICD-10-CM

## 2024-04-15 DIAGNOSIS — E66.01 SEVERE OBESITY (BMI 35.0-39.9) WITH COMORBIDITY (HCC): ICD-10-CM

## 2024-04-15 DIAGNOSIS — G91.8 CHRONIC BRAIN-HYDROCEPHALUS SYNDROME (HCC): ICD-10-CM

## 2024-04-15 DIAGNOSIS — Z13.29 SCREENING FOR HYPOTHYROIDISM: ICD-10-CM

## 2024-04-15 DIAGNOSIS — F81.9 MENTAL DEVELOPMENTAL DELAY: ICD-10-CM

## 2024-04-15 DIAGNOSIS — R73.9 HYPERGLYCEMIA: ICD-10-CM

## 2024-04-15 DIAGNOSIS — E55.9 VITAMIN D DEFICIENCY: ICD-10-CM

## 2024-04-15 PROCEDURE — G8417 CALC BMI ABV UP PARAM F/U: HCPCS | Performed by: NURSE PRACTITIONER

## 2024-04-15 PROCEDURE — G8427 DOCREV CUR MEDS BY ELIG CLIN: HCPCS | Performed by: NURSE PRACTITIONER

## 2024-04-15 PROCEDURE — 3017F COLORECTAL CA SCREEN DOC REV: CPT | Performed by: NURSE PRACTITIONER

## 2024-04-15 PROCEDURE — 1036F TOBACCO NON-USER: CPT | Performed by: NURSE PRACTITIONER

## 2024-04-15 PROCEDURE — 99204 OFFICE O/P NEW MOD 45 MIN: CPT | Performed by: NURSE PRACTITIONER

## 2024-04-15 NOTE — PROGRESS NOTES
Chief Complaint   Patient presents with    Establish Care     Former patient at [facility]

## 2024-04-16 LAB
25(OH)D3+25(OH)D2 SERPL-MCNC: 25.8 NG/ML (ref 30–100)
CHOLEST SERPL-MCNC: 159 MG/DL (ref 100–199)
HBA1C MFR BLD: 5.5 % (ref 4.8–5.6)
HDLC SERPL-MCNC: 50 MG/DL
IMP & REVIEW OF LAB RESULTS: NORMAL
LDLC SERPL CALC-MCNC: 90 MG/DL (ref 0–99)
SPECIMEN STATUS REPORT: NORMAL
TRIGL SERPL-MCNC: 104 MG/DL (ref 0–149)
TSH SERPL DL<=0.005 MIU/L-ACNC: 0.94 UIU/ML (ref 0.45–4.5)
VLDLC SERPL CALC-MCNC: 19 MG/DL (ref 5–40)

## 2024-04-16 RX ORDER — ERGOCALCIFEROL 1.25 MG/1
50000 CAPSULE ORAL WEEKLY
Qty: 12 CAPSULE | Refills: 0 | Status: SHIPPED | OUTPATIENT
Start: 2024-04-16 | End: 2024-07-15

## 2024-04-27 ASSESSMENT — ENCOUNTER SYMPTOMS
DIARRHEA: 0
WHEEZING: 0
SHORTNESS OF BREATH: 0
ABDOMINAL PAIN: 0
CHEST TIGHTNESS: 0
COUGH: 0
NAUSEA: 0

## 2024-04-27 NOTE — PROGRESS NOTES
Farrah King (:  1970) is a 53 y.o. female who presents to the office today to establish care.  Establish Care (Former patient at [facility])      Assessment & Plan   ASSESSMENT/PLAN:  1. Encounter to establish care with new doctor  2. Seizure (HCC)  3. Severe obesity (BMI 35.0-39.9) with comorbidity (HCC)  4. Recurrent major depressive disorder, in partial remission (HCC)  5. Elevated LDL cholesterol level  -     Lipid Panel; Future  6. Mental developmental delay  7. Chronic brain-hydrocephalus syndrome (HCC)  8. Vitamin D deficiency  -     Vitamin D 25 Hydroxy; Future  9. Hyperglycemia  -     Hemoglobin A1C; Future  10. Screening for hypothyroidism  -     TSH; Future           Return in about 3 months (around 7/15/2024).         Subjective   SUBJECTIVE/OBJECTIVE:  HPI  Pt presents to the clinic to establish care. Former pt of [facility]. Pmhx of depression chronic brain hydrocephalus causing developmental delay and seizures. Pt followed by neurology. Pt denies any current complaints or concerns.     Allergies   Allergen Reactions   • Adhesive Tape Other (See Comments)   • Methylprednisolone Anxiety and Other (See Comments)     Current Outpatient Medications   Medication Sig Dispense Refill   • TROKENDI XR 50 MG CP24 Take 1 capsule by mouth daily. In addition to 400mg daily for a total dose of 450mg daily for convulsive seizures. 30 capsule 3   • TROKENDI  MG CP24 Take 400 mg by mouth nightly 180 capsule 3   • rosuvastatin (CRESTOR) 5 MG tablet Take 1 tablet by mouth nightly Indications: High Amount of Fats in the Blood 90 tablet 3   • FLUoxetine (PROZAC) 20 MG capsule Take 1 capsule by mouth nightly. 90 capsule 1   • fluticasone (FLONASE) 50 MCG/ACT nasal spray Instill 2 sprays in each nostril daily for allergies. 48 g 3   • MYRBETRIQ 50 MG TB24      • DILANTIN 100 MG ER capsule Take 2 capsules by mouth every morning, and, take 3 capsules by mouth at bedtime. 450 capsule 3   • albuterol

## 2024-05-04 NOTE — TELEPHONE ENCOUNTER
For what? If home health ordered secondary to ortho complaint then ortho should be ordering. I have not seen her in two years, she is on my schedule for next week though. No

## 2024-06-03 ENCOUNTER — TELEPHONE (OUTPATIENT)
Facility: CLINIC | Age: 54
End: 2024-06-03

## 2024-06-03 RX ORDER — ONDANSETRON 4 MG/1
4 TABLET, FILM COATED ORAL DAILY PRN
Qty: 30 TABLET | Refills: 0 | Status: SHIPPED | OUTPATIENT
Start: 2024-06-03

## 2024-06-03 NOTE — TELEPHONE ENCOUNTER
Mom states that patient started with a headache around 5pm yesterday and then about an hour late started vomiting and was unable to keep anything down until about 9am this am - she is now sleeping and is able to hold down about 6 ounces of ginger ale so far - oral temp at 99.8 - mom is asking for a prescription of Zofran SL tablets to have on hand because she has had these before - feels that the patient is too much to get out of the house to the dr or ER right now due to wheelchair bound and such a taxing effort - patient uses [facility] in [place] if Zofran can be called in for her  Tabitha KENNY Malone LPN 6/3/2024 10:40 AM

## 2024-06-04 ENCOUNTER — TELEPHONE (OUTPATIENT)
Facility: CLINIC | Age: 54
End: 2024-06-04

## 2024-06-04 NOTE — TELEPHONE ENCOUNTER
Called to check on patient - Mom states she is very upset that no one called her back yesterday consdering her daughter has seizures - I explained that ALYSA Shea did not get to the message to fill the Zofran until later int he day after office hours - I apologized fort he delay in our response - she states patient is on the mend today and in fact has not vomited since 9am yesterday - is now able to keep her medications down - confirmed the upcoming appointment in July 2024  Tabitha Malone LPN 6/4/2024 10:08 AM

## 2024-06-13 ENCOUNTER — TELEPHONE (OUTPATIENT)
Age: 54
End: 2024-06-13

## 2024-06-13 NOTE — TELEPHONE ENCOUNTER
Patient mother requesting a call to discuss why pill moni that was received today has no Trokendi Medication in it.    She stated the PA is good until 12/31/24    Please call to discuss.    The patient is out of medication and has none for tonight.

## 2024-06-13 NOTE — TELEPHONE ENCOUNTER
Spoke with patient's mother,  verified, and mother states Trokendi XR 200mg needs a PA because it did not come with her pill pack delivery. Informed mother will send message to the PA specialist high priority. Mother states she has enough for about 5 days.

## 2024-06-17 NOTE — TELEPHONE ENCOUNTER
Spoke with patient's mother, WISAM of patient verified, and informed that according to the [employer] [facility] Teressa HENDRIX is not on the formulary and will need an appeal. Informed patient's mother, per Dr. Beltre can try to send to a local pharmacy. Mother stated she will call Medicaid first and call back because she thought medication was approved through 2024.  Will await return call from mother regarding what pharmacy (local).

## 2024-06-17 NOTE — TELEPHONE ENCOUNTER
Patient mother called back and stated the patient will be out of medication tonight and she would also like to discuss about the medication needing a PA.

## 2024-06-19 NOTE — TELEPHONE ENCOUNTER
Patient mother requesting a call to discuss medication Trokendi    She stated not understanding what  the insurance wants her to do.

## 2024-06-20 DIAGNOSIS — G40.909 SEIZURE DISORDER (HCC): ICD-10-CM

## 2024-06-20 RX ORDER — TOPIRAMATE 200 MG/1
400 CAPSULE, EXTENDED RELEASE ORAL
Qty: 180 CAPSULE | Refills: 3 | Status: SHIPPED | OUTPATIENT
Start: 2024-06-20

## 2024-06-20 NOTE — TELEPHONE ENCOUNTER
Patient mother requesting Nurse Lorna to return her call to discuss the shipment of medication Trokendi

## 2024-06-20 NOTE — TELEPHONE ENCOUNTER
Spoke with patient's mother,  verified of patient, and she has spoke with insurance company and was informed Yolande has been approved 2024 -2025 and RX needs to go to [facility].

## 2024-06-27 ENCOUNTER — OFFICE VISIT (OUTPATIENT)
Facility: CLINIC | Age: 54
End: 2024-06-27
Payer: MEDICARE

## 2024-06-27 VITALS
BODY MASS INDEX: 39.77 KG/M2 | TEMPERATURE: 98.5 F | SYSTOLIC BLOOD PRESSURE: 125 MMHG | HEART RATE: 67 BPM | RESPIRATION RATE: 18 BRPM | DIASTOLIC BLOOD PRESSURE: 74 MMHG | HEIGHT: 65 IN | OXYGEN SATURATION: 96 %

## 2024-06-27 DIAGNOSIS — F33.1 MAJOR DEPRESSIVE DISORDER, RECURRENT, MODERATE (HCC): ICD-10-CM

## 2024-06-27 DIAGNOSIS — R26.89 LOSS OF BALANCE: Primary | ICD-10-CM

## 2024-06-27 PROBLEM — F33.9 MAJOR DEPRESSIVE DISORDER, RECURRENT, UNSPECIFIED (HCC): Status: RESOLVED | Noted: 2023-10-31 | Resolved: 2024-06-27

## 2024-06-27 PROBLEM — A41.9 SEPSIS (HCC): Status: RESOLVED | Noted: 2019-04-07 | Resolved: 2024-06-27

## 2024-06-27 PROBLEM — F33.0 MAJOR DEPRESSIVE DISORDER, RECURRENT, MILD (HCC): Status: RESOLVED | Noted: 2023-10-31 | Resolved: 2024-06-27

## 2024-06-27 PROCEDURE — 3017F COLORECTAL CA SCREEN DOC REV: CPT | Performed by: FAMILY MEDICINE

## 2024-06-27 PROCEDURE — G8417 CALC BMI ABV UP PARAM F/U: HCPCS | Performed by: FAMILY MEDICINE

## 2024-06-27 PROCEDURE — G8427 DOCREV CUR MEDS BY ELIG CLIN: HCPCS | Performed by: FAMILY MEDICINE

## 2024-06-27 PROCEDURE — 1036F TOBACCO NON-USER: CPT | Performed by: FAMILY MEDICINE

## 2024-06-27 PROCEDURE — 99214 OFFICE O/P EST MOD 30 MIN: CPT | Performed by: FAMILY MEDICINE

## 2024-06-27 ASSESSMENT — PATIENT HEALTH QUESTIONNAIRE - PHQ9
1. LITTLE INTEREST OR PLEASURE IN DOING THINGS: SEVERAL DAYS
SUM OF ALL RESPONSES TO PHQ QUESTIONS 1-9: 2
SUM OF ALL RESPONSES TO PHQ QUESTIONS 1-9: 8
10. IF YOU CHECKED OFF ANY PROBLEMS, HOW DIFFICULT HAVE THESE PROBLEMS MADE IT FOR YOU TO DO YOUR WORK, TAKE CARE OF THINGS AT HOME, OR GET ALONG WITH OTHER PEOPLE: NOT DIFFICULT AT ALL
SUM OF ALL RESPONSES TO PHQ QUESTIONS 1-9: 8
2. FEELING DOWN, DEPRESSED OR HOPELESS: SEVERAL DAYS
1. LITTLE INTEREST OR PLEASURE IN DOING THINGS: SEVERAL DAYS
SUM OF ALL RESPONSES TO PHQ QUESTIONS 1-9: 2
7. TROUBLE CONCENTRATING ON THINGS, SUCH AS READING THE NEWSPAPER OR WATCHING TELEVISION: SEVERAL DAYS
4. FEELING TIRED OR HAVING LITTLE ENERGY: SEVERAL DAYS
SUM OF ALL RESPONSES TO PHQ9 QUESTIONS 1 & 2: 2
2. FEELING DOWN, DEPRESSED OR HOPELESS: SEVERAL DAYS
9. THOUGHTS THAT YOU WOULD BE BETTER OFF DEAD, OR OF HURTING YOURSELF: NOT AT ALL
SUM OF ALL RESPONSES TO PHQ QUESTIONS 1-9: 2
5. POOR APPETITE OR OVEREATING: NEARLY EVERY DAY
3. TROUBLE FALLING OR STAYING ASLEEP: SEVERAL DAYS
8. MOVING OR SPEAKING SO SLOWLY THAT OTHER PEOPLE COULD HAVE NOTICED. OR THE OPPOSITE, BEING SO FIGETY OR RESTLESS THAT YOU HAVE BEEN MOVING AROUND A LOT MORE THAN USUAL: NOT AT ALL
SUM OF ALL RESPONSES TO PHQ QUESTIONS 1-9: 8
SUM OF ALL RESPONSES TO PHQ QUESTIONS 1-9: 8
SUM OF ALL RESPONSES TO PHQ9 QUESTIONS 1 & 2: 2
SUM OF ALL RESPONSES TO PHQ QUESTIONS 1-9: 2
6. FEELING BAD ABOUT YOURSELF - OR THAT YOU ARE A FAILURE OR HAVE LET YOURSELF OR YOUR FAMILY DOWN: NOT AT ALL

## 2024-06-27 NOTE — PROGRESS NOTES
Chief Complaint   Patient presents with    Fall     Falls, unsteady on feet  Pt stated that she is trembling on the inside  Ears feel funny         Patient has not been out of the country in (14 months), NO diarrhea, NO cough, NO chest conjestion, NO temp.  Pt has not been around anyone with these symptoms.     Health Maintenance reviewed.    I have reviewed the patient's medical history in detail and updated the computerized patient record.    \"Have you been to the ER, urgent care clinic since your last visit?  No Hospitalized since your last visit?\"    no    “Have you seen or consulted any other health care providers outside of [facility] since your last visit?”    no       Have you had a mammogram?”   no    Date of last Mammogram: 4/6/2022

## 2024-06-27 NOTE — PROGRESS NOTES
Farrah King (:  1970) is a 53 y.o. female, Established patient, here for evaluation of the following chief complaint(s):  Fall (Falls, unsteady on feet/Pt stated that she is trembling on the inside/Ears feel funny//)         Assessment & Plan  1. Labyrinthitis.  The patient's balance issue appears to be self-limited, with no signs of CVA, ear infection or fluid accumulation. The patient was advised to take half a tablet of meclizine. Should the condition persist, a referral to a therapist will be considered.    Results  Laboratory Studies  Blood sugar was 100.  1. Loss of balance  2. Major depressive disorder, recurrent, moderate (HCC)           No orders of the defined types were placed in this encounter.    Current Outpatient Medications   Medication Sig Dispense Refill    TROKENDI  MG CP24 Take 400 mg by mouth nightly 180 capsule 3    rosuvastatin (CRESTOR) 5 MG tablet Take 1 tablet by mouth nightly Indications: High Amount of Fats in the Blood 90 tablet 3    FLUoxetine (PROZAC) 20 MG capsule Take 1 capsule by mouth nightly. 90 capsule 1    fluticasone (FLONASE) 50 MCG/ACT nasal spray Instill 2 sprays in each nostril daily for allergies. 48 g 3    DILANTIN 100 MG ER capsule Take 2 capsules by mouth every morning, and, take 3 capsules by mouth at bedtime. 450 capsule 3    albuterol sulfate HFA (PROVENTIL;VENTOLIN;PROAIR) 108 (90 Base) MCG/ACT inhaler Inhale 2 puffs into the lungs every 4 hours as needed      ascorbic acid (VITAMIN C) 500 MG tablet Take 1 tablet by mouth daily      methenamine (HIPREX) 1 g tablet Take 1 tablet by mouth 2 times daily (with meals)      vitamin D (ERGOCALCIFEROL) 1.25 MG (89667 UT) CAPS capsule Take 1 capsule by mouth once a week 12 capsule 0     No current facility-administered medications for this visit.       Return if symptoms worsen or fail to improve.      Subjective   History of Present Illness  The patient is a 53-year-old female who presents for

## 2024-06-28 ENCOUNTER — TELEPHONE (OUTPATIENT)
Age: 54
End: 2024-06-28

## 2024-06-28 NOTE — TELEPHONE ENCOUNTER
Patient mother requesting a call to discuss getting a drug level for her daughter due to her having Unsteady Gait.    She stated falling and having vision issues also.

## 2024-06-28 NOTE — TELEPHONE ENCOUNTER
Spoke with patient's mother, HIPAA verified and , and was wondering if Dr. Beltre could order Phenytoin level because patient has fallen a couple of times, having vision issues, and unsteady gait. Informed mother will check with Dr. Beltre when he returns on Monday. Patient's mother verbalized understanding.

## 2024-07-01 DIAGNOSIS — Z51.81 THERAPEUTIC DRUG MONITORING: ICD-10-CM

## 2024-07-01 DIAGNOSIS — G40.209 LOCALIZATION-RELATED (FOCAL) (PARTIAL) SYMPTOMATIC EPILEPSY AND EPILEPTIC SYNDROMES WITH COMPLEX PARTIAL SEIZURES, NOT INTRACTABLE, WITHOUT STATUS EPILEPTICUS (HCC): Primary | ICD-10-CM

## 2024-07-01 DIAGNOSIS — G40.209 LOCALIZATION-RELATED (FOCAL) (PARTIAL) SYMPTOMATIC EPILEPSY AND EPILEPTIC SYNDROMES WITH COMPLEX PARTIAL SEIZURES, NOT INTRACTABLE, WITHOUT STATUS EPILEPTICUS (HCC): ICD-10-CM

## 2024-07-01 NOTE — TELEPHONE ENCOUNTER
Spoke with patient's mother, HIPAA verified, and informed that Dr. Beltre has ordered Topiramate and phenytoin blood level. Mother verbalized understanding.

## 2024-07-02 LAB — TOPIRAMATE SERPL-MCNC: 5.3 UG/ML (ref 2–25)

## 2024-07-03 ENCOUNTER — TELEPHONE (OUTPATIENT)
Age: 54
End: 2024-07-03

## 2024-07-03 NOTE — TELEPHONE ENCOUNTER
Patient mother requesting a call to discuss her getting a sooner follow up appt.    She stated having some concerns and think she needs to be seen sooner.

## 2024-07-04 DIAGNOSIS — G40.909 SEIZURE DISORDER (HCC): Primary | ICD-10-CM

## 2024-07-06 LAB
PHENYTOIN FREE SERPL-MCNC: 3.3 UG/ML (ref 1–2)
PHENYTOIN SERPL-MCNC: ABNORMAL UG/ML

## 2024-07-08 ENCOUNTER — TELEPHONE (OUTPATIENT)
Age: 54
End: 2024-07-08

## 2024-07-08 RX ORDER — PHENYTOIN SODIUM 100 MG/1
CAPSULE, EXTENDED RELEASE ORAL
Qty: 450 CAPSULE | Refills: 0 | Status: SHIPPED | OUTPATIENT
Start: 2024-07-08

## 2024-07-08 NOTE — TELEPHONE ENCOUNTER
Patient's mother states daughter was in Er for dizziness, headaches, high Dilantin level    Pls cll to discuss

## 2024-07-08 NOTE — TELEPHONE ENCOUNTER
Patient mother at [facility] .  Saturday 7/6/24 she took her daughter to the  ER Doctor at [facility] in [place] and he stated speaking with Dr. Guerrero about getting order for drug levels . ( Dilantin)    She needs a return call ASA due to her daughter hasn't been on Dilantin since 7/7/24 or today at the request of Dr. Guerrero

## 2024-07-09 ENCOUNTER — OFFICE VISIT (OUTPATIENT)
Age: 54
End: 2024-07-09
Payer: MEDICARE

## 2024-07-09 VITALS
OXYGEN SATURATION: 97 % | HEART RATE: 78 BPM | RESPIRATION RATE: 18 BRPM | DIASTOLIC BLOOD PRESSURE: 76 MMHG | SYSTOLIC BLOOD PRESSURE: 130 MMHG

## 2024-07-09 DIAGNOSIS — Z51.81 THERAPEUTIC DRUG MONITORING: ICD-10-CM

## 2024-07-09 DIAGNOSIS — F41.9 ANXIETY: ICD-10-CM

## 2024-07-09 DIAGNOSIS — G40.209 LOCALIZATION-RELATED (FOCAL) (PARTIAL) SYMPTOMATIC EPILEPSY AND EPILEPTIC SYNDROMES WITH COMPLEX PARTIAL SEIZURES, NOT INTRACTABLE, WITHOUT STATUS EPILEPTICUS (HCC): Primary | ICD-10-CM

## 2024-07-09 DIAGNOSIS — R25.1 TREMOR: ICD-10-CM

## 2024-07-09 DIAGNOSIS — R29.898 RIGHT LEG WEAKNESS: ICD-10-CM

## 2024-07-09 PROCEDURE — G8417 CALC BMI ABV UP PARAM F/U: HCPCS | Performed by: PSYCHIATRY & NEUROLOGY

## 2024-07-09 PROCEDURE — 99215 OFFICE O/P EST HI 40 MIN: CPT | Performed by: PSYCHIATRY & NEUROLOGY

## 2024-07-09 PROCEDURE — 3017F COLORECTAL CA SCREEN DOC REV: CPT | Performed by: PSYCHIATRY & NEUROLOGY

## 2024-07-09 PROCEDURE — G8427 DOCREV CUR MEDS BY ELIG CLIN: HCPCS | Performed by: PSYCHIATRY & NEUROLOGY

## 2024-07-09 PROCEDURE — 1036F TOBACCO NON-USER: CPT | Performed by: PSYCHIATRY & NEUROLOGY

## 2024-07-09 NOTE — PROGRESS NOTES
her condition, potential etiology, prognosis, certainly consistent with Dilantin toxicity, continued reduced dose of Dilantin, recheck phenytoin levels in a week, seizure safety and precaution    This note was created using voice recognition software. Despite editing, there may be syntax errors.

## 2024-07-16 DIAGNOSIS — Z51.81 THERAPEUTIC DRUG MONITORING: ICD-10-CM

## 2024-07-16 DIAGNOSIS — G40.209 LOCALIZATION-RELATED (FOCAL) (PARTIAL) SYMPTOMATIC EPILEPSY AND EPILEPTIC SYNDROMES WITH COMPLEX PARTIAL SEIZURES, NOT INTRACTABLE, WITHOUT STATUS EPILEPTICUS (HCC): ICD-10-CM

## 2024-07-22 ENCOUNTER — PATIENT MESSAGE (OUTPATIENT)
Age: 54
End: 2024-07-22

## 2024-07-22 DIAGNOSIS — G40.209 LOCALIZATION-RELATED (FOCAL) (PARTIAL) SYMPTOMATIC EPILEPSY AND EPILEPTIC SYNDROMES WITH COMPLEX PARTIAL SEIZURES, NOT INTRACTABLE, WITHOUT STATUS EPILEPTICUS (HCC): ICD-10-CM

## 2024-07-22 LAB
PHENYTOIN FREE SERPL-MCNC: ABNORMAL UG/ML
PHENYTOIN SERPL-MCNC: 20.9 UG/ML (ref 10–20)

## 2024-07-23 ENCOUNTER — TELEPHONE (OUTPATIENT)
Age: 54
End: 2024-07-23

## 2024-07-23 DIAGNOSIS — G40.209 LOCALIZATION-RELATED (FOCAL) (PARTIAL) SYMPTOMATIC EPILEPSY AND EPILEPTIC SYNDROMES WITH COMPLEX PARTIAL SEIZURES, NOT INTRACTABLE, WITHOUT STATUS EPILEPTICUS (HCC): Primary | ICD-10-CM

## 2024-07-23 RX ORDER — LACOSAMIDE 50 MG/1
50 TABLET ORAL 2 TIMES DAILY
Qty: 60 TABLET | Refills: 5 | Status: SHIPPED | OUTPATIENT
Start: 2024-07-23 | End: 2025-01-19

## 2024-07-23 RX ORDER — LACOSAMIDE 50 MG/1
50 TABLET ORAL 2 TIMES DAILY
Qty: 60 TABLET | Refills: 5 | Status: SHIPPED | OUTPATIENT
Start: 2024-07-23 | End: 2024-07-23 | Stop reason: SDUPTHER

## 2024-08-03 DIAGNOSIS — F41.9 ANXIETY: ICD-10-CM

## 2024-09-27 DIAGNOSIS — G40.909 SEIZURE DISORDER (HCC): ICD-10-CM

## 2024-09-27 RX ORDER — PHENYTOIN SODIUM 100 MG/1
200 CAPSULE, EXTENDED RELEASE ORAL DAILY
Qty: 180 CAPSULE | Refills: 3 | Status: SHIPPED | OUTPATIENT
Start: 2024-09-27

## 2024-09-29 DIAGNOSIS — G40.909 SEIZURE DISORDER (HCC): ICD-10-CM

## 2024-09-30 RX ORDER — FLUTICASONE PROPIONATE 50 MCG
SPRAY, SUSPENSION (ML) NASAL
Qty: 1 EACH | Refills: 2 | Status: SHIPPED | OUTPATIENT
Start: 2024-09-30

## 2024-09-30 NOTE — TELEPHONE ENCOUNTER
Don from [facility] would like clarification due to new order being decreased with the Dilantin from twice a day to once a day.

## 2024-10-01 RX ORDER — PHENYTOIN SODIUM 100 MG/1
200 CAPSULE, EXTENDED RELEASE ORAL DAILY
Qty: 180 CAPSULE | Refills: 3 | OUTPATIENT
Start: 2024-10-01

## 2024-10-07 ENCOUNTER — TELEPHONE (OUTPATIENT)
Age: 54
End: 2024-10-07

## 2024-10-07 NOTE — TELEPHONE ENCOUNTER
Requesting a call back for clarification on RX  DILANTIN 100 MG ER capsule due to new order being decreased from twice a day to once a day to Take 2 capsules by mouth daily.

## 2024-10-14 ENCOUNTER — OFFICE VISIT (OUTPATIENT)
Age: 54
End: 2024-10-14
Payer: MEDICARE

## 2024-10-14 VITALS
SYSTOLIC BLOOD PRESSURE: 120 MMHG | HEART RATE: 68 BPM | TEMPERATURE: 97.1 F | OXYGEN SATURATION: 97 % | DIASTOLIC BLOOD PRESSURE: 72 MMHG | RESPIRATION RATE: 16 BRPM | BODY MASS INDEX: 38.82 KG/M2 | WEIGHT: 233 LBS | HEIGHT: 65 IN

## 2024-10-14 DIAGNOSIS — G43.709 CHRONIC MIGRAINE W/O AURA, NOT INTRACTABLE, W/O STAT MIGR: ICD-10-CM

## 2024-10-14 DIAGNOSIS — G40.209 LOCALIZATION-RELATED (FOCAL) (PARTIAL) SYMPTOMATIC EPILEPSY AND EPILEPTIC SYNDROMES WITH COMPLEX PARTIAL SEIZURES, NOT INTRACTABLE, WITHOUT STATUS EPILEPTICUS (HCC): Primary | ICD-10-CM

## 2024-10-14 DIAGNOSIS — R25.1 TREMOR: ICD-10-CM

## 2024-10-14 DIAGNOSIS — Z51.81 THERAPEUTIC DRUG MONITORING: ICD-10-CM

## 2024-10-14 DIAGNOSIS — R29.898 RIGHT LEG WEAKNESS: ICD-10-CM

## 2024-10-14 DIAGNOSIS — G40.209 LOCALIZATION-RELATED (FOCAL) (PARTIAL) SYMPTOMATIC EPILEPSY AND EPILEPTIC SYNDROMES WITH COMPLEX PARTIAL SEIZURES, NOT INTRACTABLE, WITHOUT STATUS EPILEPTICUS (HCC): ICD-10-CM

## 2024-10-14 DIAGNOSIS — F41.9 ANXIETY: ICD-10-CM

## 2024-10-14 PROCEDURE — 99214 OFFICE O/P EST MOD 30 MIN: CPT | Performed by: PSYCHIATRY & NEUROLOGY

## 2024-10-14 PROCEDURE — 3017F COLORECTAL CA SCREEN DOC REV: CPT | Performed by: PSYCHIATRY & NEUROLOGY

## 2024-10-14 PROCEDURE — 1036F TOBACCO NON-USER: CPT | Performed by: PSYCHIATRY & NEUROLOGY

## 2024-10-14 PROCEDURE — G8427 DOCREV CUR MEDS BY ELIG CLIN: HCPCS | Performed by: PSYCHIATRY & NEUROLOGY

## 2024-10-14 PROCEDURE — G8484 FLU IMMUNIZE NO ADMIN: HCPCS | Performed by: PSYCHIATRY & NEUROLOGY

## 2024-10-14 PROCEDURE — G8417 CALC BMI ABV UP PARAM F/U: HCPCS | Performed by: PSYCHIATRY & NEUROLOGY

## 2024-10-14 RX ORDER — BUTALBITAL, ACETAMINOPHEN AND CAFFEINE 50; 325; 40 MG/1; MG/1; MG/1
1 TABLET ORAL EVERY 6 HOURS PRN
Qty: 60 TABLET | Refills: 0 | Status: SHIPPED | OUTPATIENT
Start: 2024-10-14

## 2024-10-14 NOTE — PROGRESS NOTES
NEUROLOGY CLINIC NOTE    Patient ID:  Farrah King  [id number]  53 y.o.  1970    Date of Visit:  October 14, 2024    Reason for Visit:  Seizures, anxiety, gait issues    Chief Complaint   Patient presents with    Seizures     1 year follow up. Patient is accompanied by her mother and states patient has had 3 seizures since 12/2023. Mother states each seizure was mild and lasted about 30-35 seconds with mild shaking on the right side and stares.       History of Present Illness:     Patient Active Problem List   Diagnosis    Colitis    Diarrhea    Blood in stool    Seizure (HCC)    Elevated LDL cholesterol level    NSAID long-term use    Mental developmental delay    Major depressive disorder, recurrent, moderate    Chronic brain-hydrocephalus syndrome (HCC)     Past Medical History:   Diagnosis Date    Arthritis     Bladder incontinence     Chronic ear infection     Ill-defined condition     chronic hydrocephalus; no shunt, well managed    Seizure disorder (HCC)      Past Surgical History:   Procedure Laterality Date    APPENDECTOMY      COLONOSCOPY N/A 4/10/2019    COLONOSCOPY performed by Jose De Jesus Capps MD at [facility] ENDOSCOPY    COLONOSCOPY N/A 6/14/2019    COLONOSCOPY performed by Magan Malave MD at [facility] ENDOSCOPY    COLONOSCOPY N/A 5/15/2023    COLONOSCOPY performed by Adriel Malave MD at [facility] ENDOSCOPY    COLONOSCOPY N/A 5/15/2023    COLONOSCOPY POLYPECTOMY SNARE/COLD BIOPSY performed by Adriel Malave MD at [facility] ENDOSCOPY    COLONOSCOPY N/A 5/15/2023    COLONOSCOPY CONTROL [id number] Clip performed by Adriel Malave MD at [facility] ENDOSCOPY    HIP SURGERY Right 08/31/2022    plate and 2 screws in right hip    OTHER SURGICAL HISTORY  2002    remove a lypoma     Current Outpatient Medications on File Prior to Visit   Medication Sig Dispense Refill    fluticasone (FLONASE) 50 MCG/ACT nasal spray Instill 2 sprays into each nostril daily for allergies. 1 each 2    DILANTIN 100 MG  Attending Attestation (For Attendings USE Only)...

## 2024-10-16 LAB
PHENYTOIN FREE SERPL-MCNC: ABNORMAL UG/ML (ref 1–2)
PHENYTOIN SERPL-MCNC: 3.9 UG/ML (ref 10–20)
TOPIRAMATE SERPL-MCNC: 9.6 UG/ML (ref 2–25)

## 2024-10-18 ENCOUNTER — TELEPHONE (OUTPATIENT)
Age: 54
End: 2024-10-18

## 2024-10-18 DIAGNOSIS — Z51.81 THERAPEUTIC DRUG MONITORING: ICD-10-CM

## 2024-10-18 DIAGNOSIS — G40.209 LOCALIZATION-RELATED (FOCAL) (PARTIAL) SYMPTOMATIC EPILEPSY AND EPILEPTIC SYNDROMES WITH COMPLEX PARTIAL SEIZURES, NOT INTRACTABLE, WITHOUT STATUS EPILEPTICUS (HCC): ICD-10-CM

## 2024-10-18 LAB — LACOSAMIDE SERPL-MCNC: 1.9 UG/ML (ref 5–10)

## 2024-10-18 RX ORDER — LACOSAMIDE 100 MG/1
100 TABLET ORAL 2 TIMES DAILY
Qty: 60 TABLET | Refills: 3 | Status: SHIPPED | OUTPATIENT
Start: 2024-10-18 | End: 2025-02-15

## 2024-10-18 NOTE — TELEPHONE ENCOUNTER
Spoke w/ Kiah. HIPAA verified. Inform her that Dr. Ibarra states  Vimpat level is low. Increase Vimpat to 100 mg BID (prescription sent). Check Vimpat level in 1 week.  Contact Dr Gregg 2 weeks to update how patient is doing. Kiah verbalizes understanding.

## 2024-10-18 NOTE — TELEPHONE ENCOUNTER
----- Message from Dr. Parker Ibarra MD sent at 10/18/2024 10:29 AM EDT -----  Let patient know Vimpat level is low.    Increase Vimpat to 100 mg BID (prescription sent).     Check Vimpat level in 1 week.    Contact Dr Gregg 2 weeks to update how patient is doing.  ----- Message -----  From: Eugene Castanon Incoming [place] [employer]  Sent: 10/16/2024   3:38 PM EDT  To: Clive Beltre Jr., MD

## 2024-10-18 NOTE — PROGRESS NOTES
Dilantin and Lacosamied level very low. I wonder if there is compliance issues.    Will increase Vimpat to 100 mg BID for now and check Vimpat level in 1 week.    Patient to call Dr Beltre in 2 weeks to update patient's status.    Parker Ibarra MD

## 2024-10-21 ENCOUNTER — OFFICE VISIT (OUTPATIENT)
Age: 54
End: 2024-10-21
Payer: MEDICARE

## 2024-10-21 VITALS
DIASTOLIC BLOOD PRESSURE: 69 MMHG | HEART RATE: 61 BPM | SYSTOLIC BLOOD PRESSURE: 125 MMHG | TEMPERATURE: 98 F | HEIGHT: 65 IN | WEIGHT: 241 LBS | BODY MASS INDEX: 40.15 KG/M2 | RESPIRATION RATE: 16 BRPM | OXYGEN SATURATION: 97 %

## 2024-10-21 DIAGNOSIS — N39.41 URGE INCONTINENCE OF URINE: ICD-10-CM

## 2024-10-21 DIAGNOSIS — R56.9 SEIZURE (HCC): Primary | ICD-10-CM

## 2024-10-21 DIAGNOSIS — Z23 NEED FOR INFLUENZA VACCINATION: ICD-10-CM

## 2024-10-21 DIAGNOSIS — E78.00 ELEVATED LDL CHOLESTEROL LEVEL: ICD-10-CM

## 2024-10-21 DIAGNOSIS — Z76.89 ENCOUNTER TO ESTABLISH CARE WITH NEW DOCTOR: ICD-10-CM

## 2024-10-21 PROCEDURE — 99204 OFFICE O/P NEW MOD 45 MIN: CPT | Performed by: NURSE PRACTITIONER

## 2024-10-21 PROCEDURE — 3017F COLORECTAL CA SCREEN DOC REV: CPT | Performed by: NURSE PRACTITIONER

## 2024-10-21 PROCEDURE — G8427 DOCREV CUR MEDS BY ELIG CLIN: HCPCS | Performed by: NURSE PRACTITIONER

## 2024-10-21 PROCEDURE — G8484 FLU IMMUNIZE NO ADMIN: HCPCS | Performed by: NURSE PRACTITIONER

## 2024-10-21 PROCEDURE — PBSHW INFLUENZA, FLUCELVAX TRIVALENT, (AGE 6 MO+) IM, PRESERVATIVE FREE, 0.5ML: Performed by: NURSE PRACTITIONER

## 2024-10-21 PROCEDURE — G8417 CALC BMI ABV UP PARAM F/U: HCPCS | Performed by: NURSE PRACTITIONER

## 2024-10-21 PROCEDURE — 1036F TOBACCO NON-USER: CPT | Performed by: NURSE PRACTITIONER

## 2024-10-21 PROCEDURE — 90661 CCIIV3 VAC ABX FR 0.5 ML IM: CPT | Performed by: NURSE PRACTITIONER

## 2024-10-21 RX ORDER — ROSUVASTATIN CALCIUM 5 MG/1
5 TABLET, COATED ORAL NIGHTLY
Qty: 90 TABLET | Refills: 3 | Status: SHIPPED | OUTPATIENT
Start: 2024-10-21

## 2024-10-21 NOTE — PROGRESS NOTES
Chief Complaint   Patient presents with    Follow-up     New to provider         Health Maintenance Due   Topic Date Due    DTaP/Tdap/Td vaccine (1 - Tdap) Never done    Shingles vaccine (1 of 2) Never done    Flu vaccine (1) 08/01/2024    COVID-19 Vaccine (1 - 2023-24 season) Never done         \"Have you been to the ER, urgent care clinic since your last visit?  Hospitalized since your last visit?\"    ER in July    “Have you seen or consulted any other health care providers outside of [facility] since your last visit?”    Neurology           
recommended and prescribed by [facility]  Reviewed available notes in the system         Encounter to establish care with new doctor   Reviewed available notes in system                    No results found for any visits on 10/21/24.    Return in about 6 months (around 4/21/2025).     I have reviewed the patient's medical history in detail and updated the computerized patient record.     We had a prolonged discussion about these complex clinical issues and went over the various important aspects to consider. All questions were answered.     She was given an after visit summary or informed of SCHEDit Access which includes patient instructions, diagnoses, current medications, & vitals.  She expressed understanding with the diagnosis and plan.     This note was created using voice recognition software. Despite editing, there may be syntax errors.     Signed,   Flor Perez MSN APRN [facility]

## 2024-10-22 PROBLEM — R32 INCONTINENCE: Status: ACTIVE | Noted: 2018-07-26

## 2024-10-22 ASSESSMENT — ENCOUNTER SYMPTOMS
COUGH: 0
NAUSEA: 0
VOMITING: 0
SHORTNESS OF BREATH: 0
ABDOMINAL PAIN: 0
CHEST TIGHTNESS: 0
CONSTIPATION: 0

## 2024-10-22 NOTE — ASSESSMENT & PLAN NOTE
Controlled  Continue to follow with neurology  Continue management of disease process and medications with neurology

## 2024-10-22 NOTE — ASSESSMENT & PLAN NOTE
Controlled and at goal  Refilled Crestor 5 mg patient to take daily  Reviewed lab work from earlier this year

## 2024-10-22 NOTE — ASSESSMENT & PLAN NOTE
Stable  Continue taking Hiprex and vitamin C as recommended and prescribed by [facility]  Reviewed available notes in the system

## 2024-10-27 DIAGNOSIS — F41.9 ANXIETY: ICD-10-CM

## 2024-10-30 LAB — LACOSAMIDE SERPL-MCNC: 3.2 UG/ML (ref 5–10)

## 2024-12-26 DIAGNOSIS — G40.209 LOCALIZATION-RELATED (FOCAL) (PARTIAL) SYMPTOMATIC EPILEPSY AND EPILEPTIC SYNDROMES WITH COMPLEX PARTIAL SEIZURES, NOT INTRACTABLE, WITHOUT STATUS EPILEPTICUS (HCC): ICD-10-CM

## 2024-12-26 DIAGNOSIS — Z51.81 THERAPEUTIC DRUG MONITORING: ICD-10-CM

## 2025-01-02 RX ORDER — LACOSAMIDE 100 MG/1
TABLET ORAL
Qty: 60 TABLET | Refills: 2 | Status: SHIPPED | OUTPATIENT
Start: 2025-01-02 | End: 2025-04-02

## 2025-01-20 ENCOUNTER — TELEPHONE (OUTPATIENT)
Age: 55
End: 2025-01-20

## 2025-01-20 DIAGNOSIS — G43.709 CHRONIC MIGRAINE W/O AURA, NOT INTRACTABLE, W/O STAT MIGR: ICD-10-CM

## 2025-01-20 RX ORDER — BUTALBITAL, ACETAMINOPHEN AND CAFFEINE 50; 325; 40 MG/1; MG/1; MG/1
1 TABLET ORAL EVERY 6 HOURS PRN
Qty: 60 TABLET | Refills: 0 | Status: SHIPPED | OUTPATIENT
Start: 2025-01-20

## 2025-01-20 NOTE — TELEPHONE ENCOUNTER
Patient is requesting a refill on Butalbital medication sent to [facility].          butalbital-acetaminophen-caffeine (FIORICET, ESGIC) -40 MG per tablet

## 2025-01-23 RX ORDER — ROSUVASTATIN CALCIUM 5 MG/1
5 TABLET, COATED ORAL NIGHTLY
Qty: 90 TABLET | Refills: 1 | Status: SHIPPED | OUTPATIENT
Start: 2025-01-23

## 2025-01-25 DIAGNOSIS — F41.9 ANXIETY: ICD-10-CM

## 2025-01-27 DIAGNOSIS — F41.9 ANXIETY: ICD-10-CM

## 2025-03-26 DIAGNOSIS — G40.209 LOCALIZATION-RELATED (FOCAL) (PARTIAL) SYMPTOMATIC EPILEPSY AND EPILEPTIC SYNDROMES WITH COMPLEX PARTIAL SEIZURES, NOT INTRACTABLE, WITHOUT STATUS EPILEPTICUS: ICD-10-CM

## 2025-03-26 DIAGNOSIS — Z51.81 THERAPEUTIC DRUG MONITORING: ICD-10-CM

## 2025-03-27 RX ORDER — LACOSAMIDE 100 MG/1
TABLET ORAL
Qty: 60 TABLET | Refills: 5 | Status: SHIPPED | OUTPATIENT
Start: 2025-03-27 | End: 2025-09-23

## 2025-04-11 DIAGNOSIS — G43.709 CHRONIC MIGRAINE W/O AURA, NOT INTRACTABLE, W/O STAT MIGR: ICD-10-CM

## 2025-04-14 RX ORDER — BUTALBITAL, ACETAMINOPHEN AND CAFFEINE 50; 325; 40 MG/1; MG/1; MG/1
1 TABLET ORAL EVERY 6 HOURS PRN
Qty: 60 TABLET | Refills: 0 | Status: SHIPPED | OUTPATIENT
Start: 2025-04-14

## 2025-05-16 ENCOUNTER — TELEPHONE (OUTPATIENT)
Age: 55
End: 2025-05-16

## 2025-05-16 NOTE — TELEPHONE ENCOUNTER
Patient mother called stating they need a letter stating the medication Trokendi is medically necessary to be faxed to her insurance.    The letter from last year will  on 25     She stated the office has the contact information wear to send the letter.

## 2025-05-21 ENCOUNTER — TELEPHONE (OUTPATIENT)
Age: 55
End: 2025-05-21

## 2025-05-21 DIAGNOSIS — G40.909 SEIZURE DISORDER (HCC): ICD-10-CM

## 2025-05-21 RX ORDER — TOPIRAMATE 200 MG/1
400 CAPSULE, EXTENDED RELEASE ORAL
Qty: 60 CAPSULE | Refills: 5 | Status: SHIPPED | OUTPATIENT
Start: 2025-05-21

## 2025-05-21 NOTE — TELEPHONE ENCOUNTER
Trokendi Xr 200 mg PA request   Case [id number]   Member ID [id number]  Sent via [employer] [id number]  Approved:   Date range 1-1-25 to 5-21-26   Letter uploaded to Media    Plan only allows for a 30 day supply at a time.      Request sent to Birgit Soler for assistance as provider [place]. Will need additional refills too.     Once Rx is revised, please advise pt's mother.     Approved:   Date range 1-1-25 to 5-21-26     Letter uploaded to [employer]    Rt Fax sent to [facility] [employer] by [facility] and advised Rx is pending revision, but PA is done.   Fax # [phone]

## 2025-05-21 NOTE — TELEPHONE ENCOUNTER
Order placed for Trokendi  mg take 400 mg nightly, PO, per Verbal Order from Joe Reilly NP on 5/21/2025 due to seizure.    Insurance will not cover a 90 day supply. Will only cover a 30 day supply.

## 2025-05-21 NOTE — TELEPHONE ENCOUNTER
LM on VM that Trokendi prescription was revised and put through as a 30 day prescription instead of a 90 day prescription.

## 2025-07-28 ENCOUNTER — TELEPHONE (OUTPATIENT)
Age: 55
End: 2025-07-28

## 2025-07-28 NOTE — TELEPHONE ENCOUNTER
Patients mother states that her daughters insurance switch from medicaid to [employer] and she says that her daughts supplies wasn't covered because it says josh is not in network with [employer]. Please advise

## 2025-07-31 ENCOUNTER — TELEPHONE (OUTPATIENT)
Age: 55
End: 2025-07-31

## 2025-07-31 NOTE — TELEPHONE ENCOUNTER
Patient's mother is calling regarding patient incontinence supplies she receives through [employer]. She was told Flor Perez does not work with [employer] on this. They called last week but hadn't heard back. Please advise, [phone].

## 2025-08-04 ENCOUNTER — TELEPHONE (OUTPATIENT)
Age: 55
End: 2025-08-04

## 2025-08-23 DIAGNOSIS — G40.209 LOCALIZATION-RELATED (FOCAL) (PARTIAL) SYMPTOMATIC EPILEPSY AND EPILEPTIC SYNDROMES WITH COMPLEX PARTIAL SEIZURES, NOT INTRACTABLE, WITHOUT STATUS EPILEPTICUS (HCC): ICD-10-CM

## 2025-08-23 DIAGNOSIS — Z51.81 THERAPEUTIC DRUG MONITORING: ICD-10-CM

## 2025-08-25 RX ORDER — LACOSAMIDE 100 MG/1
TABLET ORAL
Qty: 180 TABLET | Refills: 1 | Status: SHIPPED | OUTPATIENT
Start: 2025-08-25 | End: 2026-02-21

## (undated) DEVICE — Device

## (undated) DEVICE — ELECTRODE PT RET AD L9FT HI MOIST COND ADH HYDRGEL CORDED

## (undated) DEVICE — SNARE ENDOSCP M L240CM W27MM SHTH DIA2.4MM CHN 2.8MM OVL

## (undated) DEVICE — KENDALL RADIOLUCENT FOAM MONITORING ELECTRODE RECTANGULAR SHAPE: Brand: KENDALL

## (undated) DEVICE — CATH IV AUTOGRD BC PNK 20GA 25 -- INSYTE

## (undated) DEVICE — FIAPC® PROBE W/ FILTER 2200 A OD 2.3MM/6.9FR; L 2.2M/7.2FT: Brand: ERBE

## (undated) DEVICE — NON-REM POLYHESIVE PATIENT RETURN ELECTRODE: Brand: VALLEYLAB

## (undated) DEVICE — SYR 10ML LUER LOK 1/5ML GRAD --

## (undated) DEVICE — STRAINER URIN CALC RNL MSH -- CONVERT TO ITEM 357634

## (undated) DEVICE — 1200 GUARD II KIT W/5MM TUBE W/O VAC TUBE: Brand: GUARDIAN

## (undated) DEVICE — Z DISCONTINUED PER MEDLINE LINE GAS SAMPLING O2/CO2 LNG AD 13 FT NSL W/ TBNG FILTERLINE

## (undated) DEVICE — TRAP ENDOSCP POLYP 2 CHMBR DRAWER TYP

## (undated) DEVICE — CLIP LIG L235CM RESOL 360 BX/20

## (undated) DEVICE — SYR 3ML LL TIP 1/10ML GRAD --

## (undated) DEVICE — BASIN EMSIS 16OZ GRAPHITE PLAS KID SHP MOLD GRAD FOR ORAL

## (undated) DEVICE — SET ADMIN 16ML TBNG L100IN 2 Y INJ SITE IV PIGGY BK DISP

## (undated) DEVICE — TOWEL 4 PLY TISS 19X30 SUE WHT

## (undated) DEVICE — CONTAINER SPEC 20 ML LID NEUT BUFF FORMALIN 10 % POLYPR STS

## (undated) DEVICE — SOLIDIFIER MEDC 1200ML -- CONVERT TO 356117

## (undated) DEVICE — CUFF BLD PRSS AD CLTH SGL TB W/ BAYNT CONN ROUNDED CORNER

## (undated) DEVICE — NEONATAL-ADULT SPO2 SENSOR: Brand: NELLCOR

## (undated) DEVICE — NEEDLE HYPO 18GA L1.5IN PNK S STL HUB POLYPR SHLD REG BVL

## (undated) DEVICE — INJECTION THERAPY NEEDLE CATHETER: Brand: INTERJECT

## (undated) DEVICE — CLIP INT L235CM WRK CHAN DIA2.8MM OPN 11MM LCK MECHANISM MR

## (undated) DEVICE — BAG SPEC BIOHZRD 10 X 10 IN --

## (undated) DEVICE — ENDOSCOPIC KIT COMPLIANCE ENDOKIT

## (undated) DEVICE — IV START KIT: Brand: MEDLINE

## (undated) DEVICE — FORCEPS BX L240CM JAW DIA2.8MM L CAP W/ NDL MIC MESH TOOTH

## (undated) DEVICE — SINGLE-USE BIOPSY FORCEPS: Brand: RADIAL JAW 4

## (undated) DEVICE — TIP SUCT TRNSPAR RIB SURF STD BLB RIG NVENT W/ 5IN1 CONN DYND50138] MEDLINE INDUSTRIES INC]

## (undated) DEVICE — SNARE ENDOSCP POLYP MED STD AD 2.4X27X240 CM 2.8 MM OVL SENS

## (undated) DEVICE — FIAPC® PROBE W/ FILTER 2200 C OD 2.3MM/6.9FR; L 2.2M/7.2FT: Brand: ERBE

## (undated) DEVICE — TRAP SUC MUCOUS 70ML -- MEDICHOICE MEDLINE